# Patient Record
Sex: FEMALE | Race: WHITE | NOT HISPANIC OR LATINO | Employment: OTHER | URBAN - METROPOLITAN AREA
[De-identification: names, ages, dates, MRNs, and addresses within clinical notes are randomized per-mention and may not be internally consistent; named-entity substitution may affect disease eponyms.]

---

## 2017-05-02 DIAGNOSIS — E11.22 TYPE 2 DIABETES MELLITUS WITH DIABETIC CHRONIC KIDNEY DISEASE (H): Primary | ICD-10-CM

## 2017-05-02 NOTE — TELEPHONE ENCOUNTER
Test strips      Last Written Prescription Date: 5/24/13  Last Fill Quantity: 100,  # refills: 3   Last Office Visit with G, P or [facility] prescribing provider: 6/3/16

## 2017-05-08 ENCOUNTER — DOCUMENTATION ONLY (OUTPATIENT)
Dept: LAB | Facility: CLINIC | Age: 82
End: 2017-05-08

## 2017-05-08 DIAGNOSIS — E11.22 TYPE 2 DIABETES MELLITUS WITH STAGE 3 CHRONIC KIDNEY DISEASE (H): ICD-10-CM

## 2017-05-08 DIAGNOSIS — N18.30 CKD (CHRONIC KIDNEY DISEASE) STAGE 3, GFR 30-59 ML/MIN (H): ICD-10-CM

## 2017-05-08 DIAGNOSIS — N18.30 TYPE 2 DIABETES MELLITUS WITH STAGE 3 CHRONIC KIDNEY DISEASE, WITHOUT LONG-TERM CURRENT USE OF INSULIN (H): Primary | ICD-10-CM

## 2017-05-08 DIAGNOSIS — E78.5 HYPERLIPIDEMIA LDL GOAL <100: ICD-10-CM

## 2017-05-08 DIAGNOSIS — N18.30 TYPE 2 DIABETES MELLITUS WITH STAGE 3 CHRONIC KIDNEY DISEASE, WITHOUT LONG-TERM CURRENT USE OF INSULIN (H): ICD-10-CM

## 2017-05-08 DIAGNOSIS — E11.22 TYPE 2 DIABETES MELLITUS WITH STAGE 3 CHRONIC KIDNEY DISEASE, WITHOUT LONG-TERM CURRENT USE OF INSULIN (H): Primary | ICD-10-CM

## 2017-05-08 DIAGNOSIS — E11.22 TYPE 2 DIABETES MELLITUS WITH STAGE 3 CHRONIC KIDNEY DISEASE, WITHOUT LONG-TERM CURRENT USE OF INSULIN (H): ICD-10-CM

## 2017-05-08 DIAGNOSIS — N18.30 TYPE 2 DIABETES MELLITUS WITH STAGE 3 CHRONIC KIDNEY DISEASE (H): ICD-10-CM

## 2017-05-08 LAB
ALBUMIN SERPL-MCNC: 3.7 G/DL (ref 3.4–5)
ALP SERPL-CCNC: 77 U/L (ref 40–150)
ALT SERPL W P-5'-P-CCNC: 23 U/L (ref 0–50)
ANION GAP SERPL CALCULATED.3IONS-SCNC: 9 MMOL/L (ref 3–14)
AST SERPL W P-5'-P-CCNC: 20 U/L (ref 0–45)
BILIRUB SERPL-MCNC: 0.6 MG/DL (ref 0.2–1.3)
BUN SERPL-MCNC: 27 MG/DL (ref 7–30)
CALCIUM SERPL-MCNC: 9.2 MG/DL (ref 8.5–10.1)
CHLORIDE SERPL-SCNC: 107 MMOL/L (ref 94–109)
CHOLEST SERPL-MCNC: 185 MG/DL
CO2 SERPL-SCNC: 25 MMOL/L (ref 20–32)
CREAT SERPL-MCNC: 1.35 MG/DL (ref 0.52–1.04)
DEPRECATED CALCIDIOL+CALCIFEROL SERPL-MC: 24 UG/L (ref 20–75)
GFR SERPL CREATININE-BSD FRML MDRD: 37 ML/MIN/1.7M2
GLUCOSE SERPL-MCNC: 129 MG/DL (ref 70–99)
HBA1C MFR BLD: 6.2 % (ref 4.3–6)
HDLC SERPL-MCNC: 58 MG/DL
HGB BLD-MCNC: 12.4 G/DL (ref 11.7–15.7)
LDLC SERPL CALC-MCNC: 102 MG/DL
NONHDLC SERPL-MCNC: 127 MG/DL
POTASSIUM SERPL-SCNC: 4.3 MMOL/L (ref 3.4–5.3)
PROT SERPL-MCNC: 8 G/DL (ref 6.8–8.8)
SODIUM SERPL-SCNC: 141 MMOL/L (ref 133–144)
TRIGL SERPL-MCNC: 126 MG/DL

## 2017-05-08 PROCEDURE — 85018 HEMOGLOBIN: CPT | Performed by: INTERNAL MEDICINE

## 2017-05-08 PROCEDURE — 80053 COMPREHEN METABOLIC PANEL: CPT | Performed by: INTERNAL MEDICINE

## 2017-05-08 PROCEDURE — 83036 HEMOGLOBIN GLYCOSYLATED A1C: CPT | Performed by: INTERNAL MEDICINE

## 2017-05-08 PROCEDURE — 36415 COLL VENOUS BLD VENIPUNCTURE: CPT | Performed by: INTERNAL MEDICINE

## 2017-05-08 PROCEDURE — 80061 LIPID PANEL: CPT | Performed by: INTERNAL MEDICINE

## 2017-05-08 PROCEDURE — 82306 VITAMIN D 25 HYDROXY: CPT | Performed by: INTERNAL MEDICINE

## 2017-05-15 ENCOUNTER — OFFICE VISIT (OUTPATIENT)
Dept: INTERNAL MEDICINE | Facility: CLINIC | Age: 82
End: 2017-05-15
Payer: MEDICARE

## 2017-05-15 VITALS
DIASTOLIC BLOOD PRESSURE: 78 MMHG | TEMPERATURE: 99.4 F | HEIGHT: 67 IN | HEART RATE: 87 BPM | BODY MASS INDEX: 27.04 KG/M2 | SYSTOLIC BLOOD PRESSURE: 130 MMHG | WEIGHT: 172.3 LBS

## 2017-05-15 DIAGNOSIS — R53.83 OTHER FATIGUE: ICD-10-CM

## 2017-05-15 DIAGNOSIS — K21.9 GASTROESOPHAGEAL REFLUX DISEASE WITHOUT ESOPHAGITIS: ICD-10-CM

## 2017-05-15 DIAGNOSIS — N18.30 TYPE 2 DIABETES MELLITUS WITH STAGE 3 CHRONIC KIDNEY DISEASE, WITHOUT LONG-TERM CURRENT USE OF INSULIN (H): Primary | ICD-10-CM

## 2017-05-15 DIAGNOSIS — I10 ESSENTIAL HYPERTENSION, BENIGN: ICD-10-CM

## 2017-05-15 DIAGNOSIS — E11.22 TYPE 2 DIABETES MELLITUS WITH STAGE 3 CHRONIC KIDNEY DISEASE, WITHOUT LONG-TERM CURRENT USE OF INSULIN (H): Primary | ICD-10-CM

## 2017-05-15 DIAGNOSIS — N18.30 CKD (CHRONIC KIDNEY DISEASE) STAGE 3, GFR 30-59 ML/MIN (H): ICD-10-CM

## 2017-05-15 DIAGNOSIS — E78.5 HYPERLIPIDEMIA LDL GOAL <100: ICD-10-CM

## 2017-05-15 LAB
ANION GAP SERPL CALCULATED.3IONS-SCNC: 10 MMOL/L (ref 3–14)
BUN SERPL-MCNC: 24 MG/DL (ref 7–30)
CALCIUM SERPL-MCNC: 9.3 MG/DL (ref 8.5–10.1)
CHLORIDE SERPL-SCNC: 107 MMOL/L (ref 94–109)
CO2 SERPL-SCNC: 25 MMOL/L (ref 20–32)
CREAT SERPL-MCNC: 1.24 MG/DL (ref 0.52–1.04)
GFR SERPL CREATININE-BSD FRML MDRD: 41 ML/MIN/1.7M2
GLUCOSE SERPL-MCNC: 106 MG/DL (ref 70–99)
POTASSIUM SERPL-SCNC: 4 MMOL/L (ref 3.4–5.3)
SODIUM SERPL-SCNC: 142 MMOL/L (ref 133–144)
TSH SERPL DL<=0.005 MIU/L-ACNC: 1.77 MU/L (ref 0.4–4)

## 2017-05-15 PROCEDURE — 84443 ASSAY THYROID STIM HORMONE: CPT | Performed by: INTERNAL MEDICINE

## 2017-05-15 PROCEDURE — 82043 UR ALBUMIN QUANTITATIVE: CPT | Performed by: INTERNAL MEDICINE

## 2017-05-15 PROCEDURE — 36415 COLL VENOUS BLD VENIPUNCTURE: CPT | Performed by: INTERNAL MEDICINE

## 2017-05-15 PROCEDURE — 99207 C FOOT EXAM  NO CHARGE: CPT | Performed by: INTERNAL MEDICINE

## 2017-05-15 PROCEDURE — 80048 BASIC METABOLIC PNL TOTAL CA: CPT | Performed by: INTERNAL MEDICINE

## 2017-05-15 PROCEDURE — 99214 OFFICE O/P EST MOD 30 MIN: CPT | Performed by: INTERNAL MEDICINE

## 2017-05-15 NOTE — MR AVS SNAPSHOT
"              After Visit Summary   5/15/2017    Janet Cope    MRN: [id number]           Patient Information     Date Of Birth          8/7/1934        Visit Information        Provider Department      5/15/2017 3:30 PM Frank Kovacs MD [facility]        Today's Diagnoses     Type 2 diabetes mellitus with stage 3 chronic kidney disease, without long-term current use of insulin (H)    -  1    CKD (chronic kidney disease) stage 3, GFR 30-59 ml/min        Other fatigue          Care Instructions     Hold wine and Cetirizine/Zyrtec for 1 week to se if helps fatigue. If better, then add back one glass of wine per day or the the allergy medication to see if remains better still  Labs as ordered.  Will call you tomorrow with results and plan re: blood pressure medications  Repeat cholesterol bas in 3 months with same dose of Atorvastatin        Follow-ups after your visit        Who to contact     If you have questions or need follow up information about today's clinic visit or your schedule please contact [facility] directly at [phone].  Normal or non-critical lab and imaging results will be communicated to you by [email or web address], letter or phone within 4 business days after the clinic has received the results. If you do not hear from us within 7 days, please contact the clinic through [employer] or phone. If you have a critical or abnormal lab result, we will notify you by phone as soon as possible.  Submit refill requests through [employer] or call your pharmacy and they will forward the refill request to us. Please allow 3 business days for your refill to be completed.          Additional Information About Your Visit        [email or web address] Information     Verdiem lets you send messages to your doctor, view your test results, renew your prescriptions, schedule appointments and more. To sign up, go to [email or web address] . Click on \"Log in\" on the left side of the screen, " "which will take you to the Welcome page. Then click on \"Sign up Now\" on the right side of the page.     You will be asked to enter the access code listed below, as well as some personal information. Please follow the directions to create your username and password.     Your access code is: [email or web address]  Expires: 2017  4:14 PM     Your access code will  in 90 days. If you need help or a new code, please call your [facility] or [phone].        Care EveryWhere ID     This is your Care EveryWhere ID. This could be used by other organizations to access your [place] medical records  [id number]        Your Vitals Were     Pulse Temperature Height BMI (Body Mass Index)          87 99.4  F (37.4  C) (Oral) 5' 7\" (1.702 m) 26.99 kg/m2         Blood Pressure from Last 3 Encounters:   05/15/17 130/78   16 130/72   08/28/15 138/76    Weight from Last 3 Encounters:   05/15/17 172 lb 4.8 oz (78.2 kg)   16 178 lb 8 oz (81 kg)   08/28/15 174 lb (78.9 kg)              We Performed the Following     Albumin Random Urine Quantitative     Basic metabolic panel     EYE EXAM (SIMPLE-NONBILLABLE)     FOOT EXAM     TSH with free T4 reflex        Primary Care Provider Office Phone # Fax #    Frank Kovacs [phone]       [facility] [address] [facility] [address]        Thank you!     Thank you for choosing [facility]  for your care. Our goal is always to provide you with excellent care. Hearing back from our patients is one way we can continue to improve our services. Please take a few minutes to complete the written survey that you may receive in the mail after your visit with us. Thank you!             Your Updated Medication List - Protect others around you: Learn how to safely use, store and throw away your medicines at www.disposemymeds.org.          This list is accurate as of: 5/15/17  4:15 PM.  Always use your most recent med list.          "          Brand Name Dispense Instructions for use    amLODIPine 2.5 MG tablet    NORVASC    90 tablet    Take 1 tablet (2.5 mg) by mouth daily       ARTHRITIS PAIN RELIEVER 650 MG CR tablet   Generic drug:  acetaminophen      Take 1 tablet (650 mg) by mouth nightly as needed for mild pain or fever       aspirin 81 MG tablet          1 tab po QD (Once per day)       atorvastatin 20 MG tablet    LIPITOR    90 tablet    Take 1 tablet (20 mg) by mouth daily       CALCIUM-VITAMIN D 600 MG Caps          2 CAPSULE DAILY       fish oil-omega-3 fatty acids 1000 MG capsule      3 qd       losartan-hydrochlorothiazide 100-12.5 MG per tablet    HYZAAR    90 tablet    Take 1 tablet by mouth daily       NEW MED     1    One Touch Lancet device       omeprazole 20 MG tablet     180 tablet    Take 1 tablet (20 mg) by mouth 2 times daily Take 30-60 minutes before a meal.       ONE TOUCH ULTRA test strip   Generic drug:  blood glucose monitoring     50 strip    USE TO TEST ONCE DAILY       order for DME     50 strip    One Touch Ultra test strips. Checking sugars daily       potassium chloride 10 MEQ tablet    K-TAB,KLOR-CON    90 tablet    Take 1 tablet (10 mEq) by mouth daily       WAL-ZYR PO      Take 10 mg by mouth daily

## 2017-05-15 NOTE — PATIENT INSTRUCTIONS
Hold wine and Cetirizine/Zyrtec for 1 week to see if helps fatigue. If better, then add back one glass of wine per day or the the allergy medication to see if remains better still  Labs as ordered.  Continue current meds  Reduce saturated fats (red meats, fried and processed foods) in your diet, increase intake of foods rich in Omega-3 fatty acids (fish, nuts, seeds, etc) and increase the amount of color on your plate with fruits and vegetables.   Repeat cholesterol labs in 3 months with same dose of Atorvastatin

## 2017-05-15 NOTE — PROGRESS NOTES
SUBJECTIVE:                                                    Janet Cope is a 82 year old female who presents to clinic today for the following health issues:        Hyperlipidemia Follow-Up      Rate your low fat/cholesterol diet?: good    Taking statin?  Yes, no muscle aches from statin    Other lipid medications/supplements?:  none     Hypertension Follow-up      Outpatient blood pressures are not being checked.    Low Salt Diet: no added salt       Amount of exercise or physical activity: 4-5 days/week for an average of 30-45 minutes    Problems taking medications regularly: No    Medication side effects: none    Diet: low salt and low fat/cholesterol    Pt's past medical history, family history, habits, medications and allergies were reviewed with the patient today.  See snap shot for  HCM status. Most recent lab results reviewed with pt. Problem list and histories reviewed & adjusted, as indicated.  Additional history as below:    Component      Latest Ref Rng & Units 6/8/2016 5/8/2017 5/15/2017   Sodium      133 - 144 mmol/L 140 141 142   Potassium      3.4 - 5.3 mmol/L 4.2 4.3 4.0   Chloride      94 - 109 mmol/L 105 107 107   Carbon Dioxide      20 - 32 mmol/L 28 25 25   Anion Gap      3 - 14 mmol/L 7 9 10   Glucose      70 - 99 mg/dL 112 (H) 129 (H) 106 (H)   Urea Nitrogen      7 - 30 mg/dL 17 27 24   Creatinine      0.52 - 1.04 mg/dL 0.96 1.35 (H) 1.24 (H)   GFR Estimate      >60 mL/min/1.7m2 56 (L) 37 (L) 41 (L)   GFR Estimate If Black      >60 mL/min/1.7m2 68 45 (L) 50 (L)   Calcium      8.5 - 10.1 mg/dL 9.3 9.2 9.3   Bilirubin Total      0.2 - 1.3 mg/dL 0.6 0.6    Albumin      3.4 - 5.0 g/dL 3.6 3.7    Protein Total      6.8 - 8.8 g/dL 7.9 8.0    Alkaline Phosphatase      40 - 150 U/L 75 77    ALT      0 - 50 U/L 27 23    AST      0 - 45 U/L 21 20    Cholesterol      <200 mg/dL  185    Triglycerides      <150 mg/dL  126    HDL Cholesterol      >49 mg/dL  58    LDL Cholesterol Calculated       "<100 mg/dL  102 (H)    Non HDL Cholesterol      <130 mg/dL  127    Creatinine Urine      mg/dL   132   Albumin Urine mg/L      mg/L   16   Albumin Urine mg/g Cr      0 - 25 mg/g Cr   12.12   Hemoglobin A1C      4.3 - 6.0 %  6.2 (H)    TSH      0.40 - 4.00 mU/L   1.77     Denies CP, SOB, abdominal pain, polyuria, polydipsia, vision changes, extremity numbness/parasthesias or skin problems.   Not checking sugars at home. Mild fatigue. Sleeping OK.  Has mild fatigue. Patient has one glass of wine at night and also using Zyrtec occasionally for seasonal allergies    Additional ROS:   Constitutional, HEENT, Cardiovascular, Pulmonary, GI and , Neuro, MSK and Psych review of systems/symptoms are otherwise negative or unchanged from previous, except as noted above.      OBJECTIVE:  /78  Pulse 87  Temp 99.4  F (37.4  C) (Oral)  Ht 5' 7\" (1.702 m)  Wt 172 lb 4.8 oz (78.2 kg)  BMI 26.99 kg/m2   Estimated body mass index is 26.99 kg/(m^2) as calculated from the following:    Height as of this encounter: 5' 7\" (1.702 m).    Weight as of this encounter: 172 lb 4.8 oz (78.2 kg).  Eye: PERRL, EOMI  HENT: ear canals and TM's normal and nose and mouth without ulcers or lesions.  Nasal mucosa minimally edematous. No discharge  Neck: no adenopathy. Thyroid normal to palpation. No bruits  Pulm: Lungs clear to auscultation   CV: Regular rates and rhythm  GI: Soft, nontender, Normal active bowel sounds, No hepatosplenomegaly or masses palpable  Ext: Peripheral pulses intact. No edema.  Neuro: Normal strength and tone, sensory exam grossly normal    Assessment/Plan: (See plan discussion below for further details)  1. Type 2 diabetes mellitus with stage 3 chronic kidney disease, without long-term current use of insulin (H)  Controlled.   Labs today as ordered. Repeat A1C 6 mos  - FOOT EXAM  - EYE EXAM (SIMPLE-NONBILLABLE)  - Basic metabolic panel  - TSH with free T4 reflex  - Albumin Random Urine Quantitative  - Hemoglobin A1c; " Future  - Basic metabolic panel; Future    2. CKD (chronic kidney disease) stage 3, GFR 30-59 ml/min  Recently worsened. Repeat labs today  - Basic metabolic panel  - Albumin Random Urine Quantitative  - Basic metabolic panel; Future    3. Other fatigue  Lab as ordered. See below in addition  - TSH with free T4 reflex    4. BENIGN HYPERTENSION    Controlled. Continue current medications  - amLODIPine (NORVASC) 2.5 MG tablet; Take 1 tablet (2.5 mg) by mouth daily  Dispense: 90 tablet; Refill: 3  - losartan (COZAAR) 100 MG tablet; Take 1 tablet (100 mg) by mouth daily  Dispense: 90 tablet; Refill: 3  - Basic metabolic panel; Future    5. Hyperlipidemia LDL goal <100  LDL minimally above goal. Continue meds ands repeat lipids lab in 3 mos.  Improve diet  - atorvastatin (LIPITOR) 20 MG tablet; Take 1 tablet (20 mg) by mouth daily  Dispense: 90 tablet; Refill: 3  - Lipid panel reflex to direct LDL; Future    6. Gastroesophageal reflux disease without esophagitis  Controlled. Flares off of PPI. Continue current  meds  - omeprazole 20 MG tablet; Take 1 tablet (20 mg) by mouth 2 times daily Take 30-60 minutes before a meal.  Dispense: 180 tablet; Refill: 3      Plan discussion:   Hold wine and Cetirizine/Zyrtec for 1 week to see if helps fatigue. If better, then add back one glass of wine per day or the the allergy medication to see if remains better still  Labs as ordered.  Continue current meds  Reduce saturated fats (red meats, fried and processed foods) in your diet, increase intake of foods rich in Omega-3 fatty acids (fish, nuts, seeds, etc) and increase the amount of color on your plate with fruits and vegetables.   Repeat cholesterol labs in 3 months with same dose of Atorvastatin    Frank Kovacs MD  Internal Medicine Department  [facility]

## 2017-05-15 NOTE — NURSING NOTE
"Chief Complaint   Patient presents with     Hypertension     Lipids     Results       Initial /78  Pulse 87  Temp 99.4  F (37.4  C) (Oral)  Ht 5' 7\" (1.702 m)  Wt 172 lb 4.8 oz (78.2 kg)  BMI 26.99 kg/m2 Estimated body mass index is 26.99 kg/(m^2) as calculated from the following:    Height as of this encounter: 5' 7\" (1.702 m).    Weight as of this encounter: 172 lb 4.8 oz (78.2 kg).  Blood pressure completed using cuff size: lorenzo Clark LPN    "

## 2017-05-16 LAB
CREAT UR-MCNC: 132 MG/DL
CREAT UR-MCNC: NORMAL MG/DL
MICROALBUMIN UR-MCNC: 16 MG/L
MICROALBUMIN UR-MCNC: NORMAL MG/L
MICROALBUMIN/CREAT UR: 12.12 MG/G CR (ref 0–25)
MICROALBUMIN/CREAT UR: NORMAL MG/G CR (ref 0–25)

## 2017-05-17 ENCOUNTER — TELEPHONE (OUTPATIENT)
Dept: INTERNAL MEDICINE | Facility: CLINIC | Age: 82
End: 2017-05-17

## 2017-05-17 RX ORDER — LOSARTAN POTASSIUM 100 MG/1
100 TABLET ORAL DAILY
Qty: 90 TABLET | Refills: 3 | Status: SHIPPED | OUTPATIENT
Start: 2017-05-16 | End: 2018-04-24 | Stop reason: ALTCHOICE

## 2017-05-17 RX ORDER — NICOTINE POLACRILEX 4 MG/1
20 GUM, CHEWING ORAL 2 TIMES DAILY
Qty: 180 TABLET | Refills: 3 | Status: SHIPPED | OUTPATIENT
Start: 2017-05-16 | End: 2018-08-13

## 2017-05-17 RX ORDER — ATORVASTATIN CALCIUM 20 MG/1
20 TABLET, FILM COATED ORAL DAILY
Qty: 90 TABLET | Refills: 3 | Status: SHIPPED | OUTPATIENT
Start: 2017-05-16 | End: 2018-04-24

## 2017-05-17 RX ORDER — AMLODIPINE BESYLATE 2.5 MG/1
2.5 TABLET ORAL DAILY
Qty: 90 TABLET | Refills: 3 | Status: SHIPPED | OUTPATIENT
Start: 2017-05-16 | End: 2018-04-24 | Stop reason: DRUGHIGH

## 2017-05-17 NOTE — TELEPHONE ENCOUNTER
Call pt. Labs show kidney function a little better but still remains worse than 1 year ago. Stop  Losartan HCT. Start Losartan 100mg tab, 1 tab daily for blood pressure. Rx sent to pharmacy. Repeat nonfasting BMP lab in 2 weeks as ordered to reassess the kidney function off of the HCTZ. Since off of HCTZ, pt does not need to take potassium supplement so stop the potassium tab also

## 2017-05-30 DIAGNOSIS — N18.30 CKD (CHRONIC KIDNEY DISEASE) STAGE 3, GFR 30-59 ML/MIN (H): ICD-10-CM

## 2017-05-30 DIAGNOSIS — N18.30 TYPE 2 DIABETES MELLITUS WITH STAGE 3 CHRONIC KIDNEY DISEASE, WITHOUT LONG-TERM CURRENT USE OF INSULIN (H): ICD-10-CM

## 2017-05-30 DIAGNOSIS — E11.22 TYPE 2 DIABETES MELLITUS WITH STAGE 3 CHRONIC KIDNEY DISEASE, WITHOUT LONG-TERM CURRENT USE OF INSULIN (H): ICD-10-CM

## 2017-05-30 LAB
ANION GAP SERPL CALCULATED.3IONS-SCNC: 11 MMOL/L (ref 3–14)
BUN SERPL-MCNC: 24 MG/DL (ref 7–30)
CALCIUM SERPL-MCNC: 9.2 MG/DL (ref 8.5–10.1)
CHLORIDE SERPL-SCNC: 107 MMOL/L (ref 94–109)
CO2 SERPL-SCNC: 24 MMOL/L (ref 20–32)
CREAT SERPL-MCNC: 1.13 MG/DL (ref 0.52–1.04)
GFR SERPL CREATININE-BSD FRML MDRD: 46 ML/MIN/1.7M2
GLUCOSE SERPL-MCNC: 110 MG/DL (ref 70–99)
HBA1C MFR BLD: 5.9 % (ref 4.3–6)
POTASSIUM SERPL-SCNC: 4.2 MMOL/L (ref 3.4–5.3)
SODIUM SERPL-SCNC: 142 MMOL/L (ref 133–144)

## 2017-05-30 PROCEDURE — 36415 COLL VENOUS BLD VENIPUNCTURE: CPT | Performed by: INTERNAL MEDICINE

## 2017-05-30 PROCEDURE — 83036 HEMOGLOBIN GLYCOSYLATED A1C: CPT | Performed by: INTERNAL MEDICINE

## 2017-05-30 PROCEDURE — 80048 BASIC METABOLIC PNL TOTAL CA: CPT | Performed by: INTERNAL MEDICINE

## 2017-06-03 DIAGNOSIS — N18.30 CKD (CHRONIC KIDNEY DISEASE) STAGE 3, GFR 30-59 ML/MIN (H): ICD-10-CM

## 2017-06-03 DIAGNOSIS — E78.5 HYPERLIPIDEMIA LDL GOAL <100: Primary | ICD-10-CM

## 2017-06-06 ENCOUNTER — TELEPHONE (OUTPATIENT)
Dept: INTERNAL MEDICINE | Facility: CLINIC | Age: 82
End: 2017-06-06

## 2017-06-06 NOTE — TELEPHONE ENCOUNTER
Letter and copy of labs mailed to pt.Pt notified letter is being mailed today. Sudha Delacruz LPN

## 2017-06-06 NOTE — TELEPHONE ENCOUNTER
Reason for Call:  Request for results:    Name of test or procedure: lab    Date of test of procedure: 05/30/2017    Location of the test or procedure: [facility]    OK to leave the result message on voice mail or with a family member? YES    Phone number Patient can be reached at:  Home number on file [phone] (home)    Additional comments:     Call taken on 6/6/2017 at 10:10 AM by ANAHI CORONADO

## 2017-06-06 NOTE — TELEPHONE ENCOUNTER
Letter has already been done routed to my nurse. Per chart, has not been mailed. PLease print copy of letter and labs and mail to pt

## 2017-08-15 ENCOUNTER — MEDICAL CORRESPONDENCE (OUTPATIENT)
Dept: HEALTH INFORMATION MANAGEMENT | Facility: CLINIC | Age: 82
End: 2017-08-15

## 2017-09-07 DIAGNOSIS — N18.30 CKD (CHRONIC KIDNEY DISEASE) STAGE 3, GFR 30-59 ML/MIN (H): ICD-10-CM

## 2017-09-07 DIAGNOSIS — E78.5 HYPERLIPIDEMIA LDL GOAL <100: ICD-10-CM

## 2017-09-07 LAB
ANION GAP SERPL CALCULATED.3IONS-SCNC: 10 MMOL/L (ref 3–14)
BUN SERPL-MCNC: 23 MG/DL (ref 7–30)
CALCIUM SERPL-MCNC: 9.3 MG/DL (ref 8.5–10.1)
CHLORIDE SERPL-SCNC: 106 MMOL/L (ref 94–109)
CHOLEST SERPL-MCNC: 167 MG/DL
CO2 SERPL-SCNC: 24 MMOL/L (ref 20–32)
CREAT SERPL-MCNC: 1.12 MG/DL (ref 0.52–1.04)
GFR SERPL CREATININE-BSD FRML MDRD: 46 ML/MIN/1.7M2
GLUCOSE SERPL-MCNC: 106 MG/DL (ref 70–99)
HDLC SERPL-MCNC: 65 MG/DL
LDLC SERPL CALC-MCNC: 82 MG/DL
NONHDLC SERPL-MCNC: 102 MG/DL
POTASSIUM SERPL-SCNC: 4.1 MMOL/L (ref 3.4–5.3)
SODIUM SERPL-SCNC: 140 MMOL/L (ref 133–144)
TRIGL SERPL-MCNC: 101 MG/DL

## 2017-09-07 PROCEDURE — 36415 COLL VENOUS BLD VENIPUNCTURE: CPT | Performed by: INTERNAL MEDICINE

## 2017-09-07 PROCEDURE — 80048 BASIC METABOLIC PNL TOTAL CA: CPT | Performed by: INTERNAL MEDICINE

## 2017-09-07 PROCEDURE — 80061 LIPID PANEL: CPT | Performed by: INTERNAL MEDICINE

## 2017-09-08 ENCOUNTER — TELEPHONE (OUTPATIENT)
Dept: INTERNAL MEDICINE | Facility: CLINIC | Age: 82
End: 2017-09-08

## 2017-09-08 DIAGNOSIS — F41.8 SITUATIONAL ANXIETY: Primary | ICD-10-CM

## 2017-09-11 RX ORDER — LORAZEPAM 0.5 MG/1
0.5 TABLET ORAL EVERY 8 HOURS PRN
Qty: 10 TABLET | Refills: 0 | Status: SHIPPED | OUTPATIENT
Start: 2017-09-11 | End: 2018-05-22

## 2017-09-21 ENCOUNTER — TELEPHONE (OUTPATIENT)
Dept: INTERNAL MEDICINE | Facility: CLINIC | Age: 82
End: 2017-09-21

## 2017-09-21 DIAGNOSIS — N18.30 CKD (CHRONIC KIDNEY DISEASE) STAGE 3, GFR 30-59 ML/MIN (H): Primary | ICD-10-CM

## 2017-09-21 DIAGNOSIS — E11.22 TYPE 2 DIABETES MELLITUS WITH STAGE 3 CHRONIC KIDNEY DISEASE, WITHOUT LONG-TERM CURRENT USE OF INSULIN (H): ICD-10-CM

## 2017-09-21 DIAGNOSIS — N18.30 TYPE 2 DIABETES MELLITUS WITH STAGE 3 CHRONIC KIDNEY DISEASE, WITHOUT LONG-TERM CURRENT USE OF INSULIN (H): ICD-10-CM

## 2017-09-21 NOTE — TELEPHONE ENCOUNTER
Reason for Call:  Request for results:    Name of test or procedure: Lab Test(s)    Date of test of procedure: 09.07.17    Location of the test or procedure: [facility] to leave the result message on voice mail or with a family member? YES    Phone number Patient can be reached at:  Home number on file [phone] (home)    Additional comments: the patient would like to be called with her results regarding lab AND have a copy/report of the results mailed to her too    Call taken on 9/21/2017 at 8:42 AM by Liz Vargas

## 2017-09-25 ENCOUNTER — TRANSFERRED RECORDS (OUTPATIENT)
Dept: HEALTH INFORMATION MANAGEMENT | Facility: CLINIC | Age: 82
End: 2017-09-25

## 2017-11-23 DIAGNOSIS — E11.22 TYPE 2 DIABETES MELLITUS WITH DIABETIC CHRONIC KIDNEY DISEASE (H): ICD-10-CM

## 2017-11-27 DIAGNOSIS — E11.22 TYPE 2 DIABETES MELLITUS WITH DIABETIC CHRONIC KIDNEY DISEASE (H): ICD-10-CM

## 2018-01-31 NOTE — TELEPHONE ENCOUNTER
Not clear that lab results were sent to the patient.  Physician spoke with the patient today and reviewed normal lipids and improved kidney function.  Patient currently in [place] until late April or early May.  Patient will have a nonfasting blood and urine test done in early May when returns and will see me a few days later before running out of medications for refills.  Labs ordered

## 2018-04-17 DIAGNOSIS — E11.22 TYPE 2 DIABETES MELLITUS WITH STAGE 3 CHRONIC KIDNEY DISEASE, WITHOUT LONG-TERM CURRENT USE OF INSULIN (H): ICD-10-CM

## 2018-04-17 DIAGNOSIS — N18.30 CKD (CHRONIC KIDNEY DISEASE) STAGE 3, GFR 30-59 ML/MIN (H): ICD-10-CM

## 2018-04-17 DIAGNOSIS — N18.30 TYPE 2 DIABETES MELLITUS WITH STAGE 3 CHRONIC KIDNEY DISEASE, WITHOUT LONG-TERM CURRENT USE OF INSULIN (H): ICD-10-CM

## 2018-04-17 LAB
ALBUMIN SERPL-MCNC: 3.9 G/DL (ref 3.4–5)
ALP SERPL-CCNC: 64 U/L (ref 40–150)
ALT SERPL W P-5'-P-CCNC: 22 U/L (ref 0–50)
ANION GAP SERPL CALCULATED.3IONS-SCNC: 8 MMOL/L (ref 3–14)
AST SERPL W P-5'-P-CCNC: 20 U/L (ref 0–45)
BILIRUB SERPL-MCNC: 0.6 MG/DL (ref 0.2–1.3)
BUN SERPL-MCNC: 34 MG/DL (ref 7–30)
CALCIUM SERPL-MCNC: 9.5 MG/DL (ref 8.5–10.1)
CHLORIDE SERPL-SCNC: 105 MMOL/L (ref 94–109)
CO2 SERPL-SCNC: 26 MMOL/L (ref 20–32)
CREAT SERPL-MCNC: 1.25 MG/DL (ref 0.52–1.04)
GFR SERPL CREATININE-BSD FRML MDRD: 41 ML/MIN/1.7M2
GLUCOSE SERPL-MCNC: 93 MG/DL (ref 70–99)
HBA1C MFR BLD: 6 % (ref 0–5.6)
HGB BLD-MCNC: 12.2 G/DL (ref 11.7–15.7)
POTASSIUM SERPL-SCNC: 4.2 MMOL/L (ref 3.4–5.3)
PROT SERPL-MCNC: 7.8 G/DL (ref 6.8–8.8)
SODIUM SERPL-SCNC: 139 MMOL/L (ref 133–144)

## 2018-04-17 PROCEDURE — 36415 COLL VENOUS BLD VENIPUNCTURE: CPT | Performed by: INTERNAL MEDICINE

## 2018-04-17 PROCEDURE — 85018 HEMOGLOBIN: CPT | Performed by: INTERNAL MEDICINE

## 2018-04-17 PROCEDURE — 83036 HEMOGLOBIN GLYCOSYLATED A1C: CPT | Performed by: INTERNAL MEDICINE

## 2018-04-17 PROCEDURE — 80053 COMPREHEN METABOLIC PANEL: CPT | Performed by: INTERNAL MEDICINE

## 2018-04-24 ENCOUNTER — OFFICE VISIT (OUTPATIENT)
Dept: INTERNAL MEDICINE | Facility: CLINIC | Age: 83
End: 2018-04-24
Payer: MEDICARE

## 2018-04-24 VITALS
HEART RATE: 78 BPM | BODY MASS INDEX: 25.74 KG/M2 | HEIGHT: 67 IN | DIASTOLIC BLOOD PRESSURE: 76 MMHG | RESPIRATION RATE: 16 BRPM | TEMPERATURE: 98 F | SYSTOLIC BLOOD PRESSURE: 128 MMHG | WEIGHT: 164 LBS

## 2018-04-24 DIAGNOSIS — N18.30 CKD (CHRONIC KIDNEY DISEASE) STAGE 3, GFR 30-59 ML/MIN (H): ICD-10-CM

## 2018-04-24 DIAGNOSIS — K64.4 EXTERNAL HEMORRHOIDS: ICD-10-CM

## 2018-04-24 DIAGNOSIS — E11.22 TYPE 2 DIABETES MELLITUS WITH STAGE 3 CHRONIC KIDNEY DISEASE, WITHOUT LONG-TERM CURRENT USE OF INSULIN (H): ICD-10-CM

## 2018-04-24 DIAGNOSIS — M54.16 LUMBAR RADICULOPATHY: ICD-10-CM

## 2018-04-24 DIAGNOSIS — N18.30 TYPE 2 DIABETES MELLITUS WITH STAGE 3 CHRONIC KIDNEY DISEASE, WITHOUT LONG-TERM CURRENT USE OF INSULIN (H): ICD-10-CM

## 2018-04-24 DIAGNOSIS — E78.5 HYPERLIPIDEMIA LDL GOAL <100: ICD-10-CM

## 2018-04-24 DIAGNOSIS — F41.8 SITUATIONAL ANXIETY: ICD-10-CM

## 2018-04-24 DIAGNOSIS — I10 ESSENTIAL HYPERTENSION, BENIGN: ICD-10-CM

## 2018-04-24 PROCEDURE — 99214 OFFICE O/P EST MOD 30 MIN: CPT | Performed by: INTERNAL MEDICINE

## 2018-04-24 RX ORDER — IRBESARTAN AND HYDROCHLOROTHIAZIDE 300; 12.5 MG/1; MG/1
1 TABLET, FILM COATED ORAL DAILY
Qty: 90 TABLET | Refills: 3 | Status: SHIPPED | OUTPATIENT
Start: 2018-04-24 | End: 2018-05-24

## 2018-04-24 RX ORDER — HYDROCHLOROTHIAZIDE 12.5 MG/1
12.5 CAPSULE ORAL DAILY
COMMUNITY
End: 2018-04-24 | Stop reason: ALTCHOICE

## 2018-04-24 RX ORDER — BUSPIRONE HYDROCHLORIDE 15 MG/1
15 TABLET ORAL 3 TIMES DAILY
COMMUNITY
End: 2018-04-24

## 2018-04-24 RX ORDER — AMLODIPINE BESYLATE 2.5 MG/1
2.5 TABLET ORAL DAILY
Qty: 90 TABLET | Refills: 3 | Status: CANCELLED | OUTPATIENT
Start: 2018-04-24

## 2018-04-24 RX ORDER — AMLODIPINE BESYLATE 5 MG/1
5 TABLET ORAL DAILY
Qty: 90 TABLET | Refills: 3 | Status: SHIPPED | OUTPATIENT
Start: 2018-04-24 | End: 2018-05-24

## 2018-04-24 RX ORDER — BUSPIRONE HYDROCHLORIDE 15 MG/1
TABLET ORAL
COMMUNITY
Start: 2018-04-24 | End: 2021-01-04

## 2018-04-24 RX ORDER — ATORVASTATIN CALCIUM 20 MG/1
20 TABLET, FILM COATED ORAL DAILY
Qty: 90 TABLET | Refills: 3 | Status: SHIPPED | OUTPATIENT
Start: 2018-04-24 | End: 2018-10-17

## 2018-04-24 RX ORDER — LORAZEPAM 0.5 MG/1
0.5 TABLET ORAL EVERY 8 HOURS PRN
Qty: 10 TABLET | Refills: 0 | Status: CANCELLED | OUTPATIENT
Start: 2018-04-24

## 2018-04-24 RX ORDER — LOSARTAN POTASSIUM 100 MG/1
100 TABLET ORAL DAILY
Qty: 90 TABLET | Refills: 3 | Status: CANCELLED | OUTPATIENT
Start: 2018-04-24

## 2018-04-24 ASSESSMENT — PAIN SCALES - GENERAL: PAINLEVEL: MODERATE PAIN (5)

## 2018-04-24 NOTE — LETTER
My Depression Action Plan  Name: Janet Cope   Date of Birth 8/7/1934  Date: 4/24/2018    My doctor: Frank Kovacs   My clinic: [address] [address] [phone]          GREEN    ZONE   Good Control    What it looks like:     Things are going generally well. You have normal up s and down s. You may even feel depressed from time to time, but bad moods usually last less than a day.   What you need to do:  1. Continue to care for yourself (see self care plan)  2. Check your depression survival kit and update it as needed  3. Follow your physician s recommendations including any medication.  4. Do not stop taking medication unless you consult with your physician first.           YELLOW         ZONE Getting Worse    What it looks like:     Depression is starting to interfere with your life.     It may be hard to get out of bed; you may be starting to isolate yourself from others.    Symptoms of depression are starting to last most all day and this has happened for several days.     You may have suicidal thoughts but they are not constant.   What you need to do:     1. Call your care team, your response to treatment will improve if you keep your care team informed of your progress. Yellow periods are signs an adjustment may need to be made.     2. Continue your self-care, even if you have to fake it!    3. Talk to someone in your support network    4. Open up your depression survival kit           RED    ZONE Medical Alert - Get Help    What it looks like:     Depression is seriously interfering with your life.     You may experience these or other symptoms: You can t get out of bed most days, can t work or engage in other necessary activities, you have trouble taking care of basic hygiene, or basic responsibilities, thoughts of suicide or death that will not go away, self-injurious behavior.     What you need to do:  1. Call your care  team and request a same-day appointment. If they are not available (weekends or after hours) call your local crisis line, emergency room or 911.            Depression Self Care Plan / Survival Kit    Self-Care for Depression  Here s the deal. Your body and mind are really not as separate as most people think.  What you do and think affects how you feel and how you feel influences what you do and think. This means if you do things that people who feel good do, it will help you feel better.  Sometimes this is all it takes.  There is also a place for medication and therapy depending on how severe your depression is, so be sure to consult with your medical provider and/ or Behavioral Health Consultant if your symptoms are worsening or not improving.     In order to better manage my stress, I will:    Exercise  Get some form of exercise, every day. This will help reduce pain and release endorphins, the  feel good  chemicals in your brain. This is almost as good as taking antidepressants!  This is not the same as joining a gym and then never going! (they count on that by the way ) It can be as simple as just going for a walk or doing some gardening, anything that will get you moving.      Hygiene   Maintain good hygiene (Get out of bed in the morning, Make your bed, Brush your teeth, Take a shower, and Get dressed like you were going to work, even if you are unemployed).  If your clothes don't fit try to get ones that do.    Diet  I will strive to eat foods that are good for me, drink plenty of water, and avoid excessive sugar, caffeine, alcohol, and other mood-altering substances.  Some foods that are helpful in depression are: complex carbohydrates, B vitamins, flaxseed, fish or fish oil, fresh fruits and vegetables.    Psychotherapy  I agree to participate in Individual Therapy (if recommended).    Medication  If prescribed medications, I agree to take them.  Missing doses can result in serious side effects.  I  understand that drinking alcohol, or other illicit drug use, may cause potential side effects.  I will not stop my medication abruptly without first discussing it with my provider.    Staying Connected With Others  I will stay in touch with my friends, family members, and my primary care provider/team.    Use your imagination  Be creative.  We all have a creative side; it doesn t matter if it s oil painting, sand castles, or mud pies! This will also kick up the endorphins.    Witness Beauty  (AKA stop and smell the roses) Take a look outside, even in mid-winter. Notice colors, textures. Watch the squirrels and birds.     Service to others  Be of service to others.  There is always someone else in need.  By helping others we can  get out of ourselves  and remember the really important things.  This also provides opportunities for practicing all the other parts of the program.    Humor  Laugh and be silly!  Adjust your TV habits for less news and crime-drama and more comedy.    Control your stress  Try breathing deep, massage therapy, biofeedback, and meditation. Find time to relax each day.     My support system    Clinic Contact:  Phone number:    Contact 1:  Phone number:    Contact 2:  Phone number:    Confucianism/:  Phone number:    Therapist:  Phone number:    Local crisis center:    Phone number:    Other community support:  Phone number:

## 2018-04-24 NOTE — PATIENT INSTRUCTIONS
Stop Irbesartan and hydrochlorothiazide separate tablets  Start Irbesartan /12.5mg, 1 tab daily so one less pill to take for blood pressure   Continue other medications. Refill done  If urine frequency at night bothersome, try to stop wine intake  for 1 week to see if makes a difference  Low salt intake   Fasting labs in late October. For fasting labs, please refrain from eating for 8 hours or more.  Be sure to  drink water and take your  medications the day of the test.  If back/leg issues worsen, then  Let me know and  will get MRI of the low back for probable pinched nerve  Appt with [facility] in [facility]. [phone].

## 2018-04-24 NOTE — MR AVS SNAPSHOT
After Visit Summary   4/24/2018    Janet Cope    MRN: [id number]           Patient Information     Date Of Birth          8/7/1934        Visit Information        Provider Department      4/24/2018 9:30 AM Frank Kovacs MD [facility]        Today's Diagnoses     Hyperlipidemia LDL goal <100        BENIGN HYPERTENSION        Situational anxiety          Care Instructions     Stop Irbesartan and hydrochlorothiazide separate tablets  Start Irbesartan /12.5mg, 1 tab daily so one less pill to take for blood pressure   Continue other medications. Refill done  If urine frequency at night bothersome, try to stop wine intake  for 1 week to see if makes a difference  Low salt intake   Fasting labs in late October. For fasting labs, please refrain from eating for 8 hours or more.  Be sure to  drink water and take your  medications the day of the test.  If back/leg issues worsen, then  Let me know and  will get MRI of the low back for probable pinched nerve  Appt with [facility] in [facility]. [phone].              Follow-ups after your visit        Who to contact     If you have questions or need follow up information about today's clinic visit or your schedule please contact [facility] directly at [phone].  Normal or non-critical lab and imaging results will be communicated to you by MyChart, letter or phone within 4 business days after the clinic has received the results. If you do not hear from us within 7 days, please contact the clinic through MyChart or phone. If you have a critical or abnormal lab result, we will notify you by phone as soon as possible.  Submit refill requests through [employer] or call your pharmacy and they will forward the refill request to us. Please allow 3 business days for your refill to be completed.          Additional Information About Your Visit        MyChart Information      "[employer] lets you send messages to your doctor, view your test results, renew your prescriptions, schedule appointments and more. To sign up, go to [email or web address] . Click on \"Log in\" on the left side of the screen, which will take you to the Welcome page. Then click on \"Sign up Now\" on the right side of the page.     You will be asked to enter the access code listed below, as well as some personal information. Please follow the directions to create your username and password.     Your access code is: [email or web address]  Expires: 2018 10:14 AM     Your access code will  in 90 days. If you need help or a new code, please call your [place] clinic or [phone].        Care EveryWhere ID     This is your Care EveryWhere ID. This could be used by other organizations to access your [place] medical records  [id number]        Your Vitals Were     Pulse Temperature Respirations Height BMI (Body Mass Index)       78 98  F (36.7  C) (Oral) 16 5' 7\" (1.702 m) 25.69 kg/m2        Blood Pressure from Last 3 Encounters:   18 128/76   05/15/17 130/78   16 130/72    Weight from Last 3 Encounters:   18 164 lb (74.4 kg)   05/15/17 172 lb 4.8 oz (78.2 kg)   16 178 lb 8 oz (81 kg)              We Performed the Following     DEPRESSION ACTION PLAN (DAP)          Today's Medication Changes          These changes are accurate as of 18 10:15 AM.  If you have any questions, ask your nurse or doctor.               Start taking these medicines.        Dose/Directions    irbesartan-hydrochlorothiazide 300-12.5 MG per tablet   Commonly known as:  AVALIDE   Used for:  Essential hypertension, benign   Started by:  Frank Kovacs MD        Dose:  1 tablet   Take 1 tablet by mouth daily   Quantity:  90 tablet   Refills:  3         These medicines have changed or have updated prescriptions.        Dose/Directions    amLODIPine 5 MG tablet   Commonly known as:  NORVASC   This may have changed:    - " medication strength  - how much to take   Used for:  Essential hypertension, benign   Changed by:  Frank Kovacs MD        Dose:  5 mg   Take 1 tablet (5 mg) by mouth daily   Quantity:  90 tablet   Refills:  3       busPIRone 15 MG tablet   Commonly known as:  BUSPAR   This may have changed:    - how much to take  - how to take this  - when to take this  - additional instructions   Used for:  Situational anxiety   Changed by:  Frank Kovacs MD        1/2 tab daily as needed for anxiety (rare use)   Refills:  0         Stop taking these medicines if you haven't already. Please contact your care team if you have questions.     hydrochlorothiazide 12.5 MG capsule   Commonly known as:  MICROZIDE   Stopped by:  Frank Kovacs MD           losartan 100 MG tablet   Commonly known as:  COZAAR   Stopped by:  Frank Kovacs MD                Where to get your medicines      These medications were sent to [facility] [address] [place] [address] [address] AT [facility] [address]  [address], [facility] [address]     Phone:  [phone]     amLODIPine 5 MG tablet    atorvastatin 20 MG tablet    irbesartan-hydrochlorothiazide 300-12.5 MG per tablet                Primary Care Provider Office Phone # Fax #    Frank Kovacs [phone]       [address] [facility][1] [address]        Equal Access to Services     [facility] Hospital[facility] AH: Hadii aad ku hadasho Soomaali, waaxda luqadaha, qaybta kaalmada adeegyada, gregorio etiennein hayjavyn palma fuentes . So [facility] [phone].    ATENCIÓN: Si habla español, tiene a sargent disposición servicios gratuitos de asistencia lingüística. Llame al [phone].    We comply with applicable federal civil rights laws and [place] laws. We do not discriminate on the basis of race, color, national origin, age, disability, sex, sexual orientation, or gender identity.            Thank you!     Thank you for choosing [facility]   for your care. Our goal is always to provide you with excellent care. Hearing back from our patients is one way we can continue to improve our services. Please take a few minutes to complete the written survey that you may receive in the mail after your visit with us. Thank you!             Your Updated Medication List - Protect others around you: Learn how to safely use, store and throw away your medicines at www.disposemymeds.org.          This list is accurate as of 4/24/18 10:15 AM.  Always use your most recent med list.                   Brand Name Dispense Instructions for use Diagnosis    amLODIPine 5 MG tablet    NORVASC    90 tablet    Take 1 tablet (5 mg) by mouth daily    Essential hypertension, benign       ARTHRITIS PAIN RELIEVER 650 MG CR tablet   Generic drug:  acetaminophen      Take 1 tablet (650 mg) by mouth nightly as needed for mild pain or fever    Cramp of limb       aspirin 81 MG tablet          1 tab po QD (Once per day)    Type II or unspecified type diabetes mellitus without mention of complication, not stated as uncontrolled       atorvastatin 20 MG tablet    LIPITOR    90 tablet    Take 1 tablet (20 mg) by mouth daily    Hyperlipidemia LDL goal <100       busPIRone 15 MG tablet    BUSPAR     1/2 tab daily as needed for anxiety (rare use)    Situational anxiety       CALCIUM-VITAMIN D 600 MG Caps          2 CAPSULE DAILY        fish oil-omega-3 fatty acids 1000 MG capsule      3 qd        irbesartan-hydrochlorothiazide 300-12.5 MG per tablet    AVALIDE    90 tablet    Take 1 tablet by mouth daily    Essential hypertension, benign       LORazepam 0.5 MG tablet    ATIVAN    10 tablet    Take 1 tablet (0.5 mg) by mouth every 8 hours as needed for anxiety    Situational anxiety       NEW MED     1    One Touch Lancet device    Type II or unspecified type diabetes mellitus without mention of complication, not stated as uncontrolled       omeprazole 20 MG tablet     180 tablet    Take 1 tablet  (20 mg) by mouth 2 times daily Take 30-60 minutes before a meal.    Gastroesophageal reflux disease without esophagitis       * ONETOUCH ULTRA test strip   Generic drug:  blood glucose monitoring     50 strip    USE TO TEST ONCE DAILY    Type 2 diabetes mellitus with diabetic chronic kidney disease (H)       * ONETOUCH ULTRA test strip   Generic drug:  blood glucose monitoring     50 strip    USE TO TEST ONCE DAILY    Type 2 diabetes mellitus with diabetic chronic kidney disease (H)       order for DME     50 strip    One Touch Ultra test strips. Checking sugars daily    Type 2 diabetes, HbA1c goal < 7% (H)       WAL-ZYR PO      Take 10 mg by mouth daily        * Notice:  This list has 2 medication(s) that are the same as other medications prescribed for you. Read the directions carefully, and ask your doctor or other care provider to review them with you.

## 2018-04-24 NOTE — PROGRESS NOTES
SUBJECTIVE:   Janet Cope is a 83 year old female who presents to clinic today for the following health issues:      Diabetes Follow-up    Patient is checking blood sugars: once daily.  Results are as follows:         am - 130's to 150's    Diabetic concerns: None     Symptoms of hypoglycemia (low blood sugar): none     Paresthesias (numbness or burning in feet) or sores: No     Date of last diabetic eye exam: 05/15/2017    Hyperlipidemia Follow-Up      Rate your low fat/cholesterol diet?: fair    Taking statin?  Yes, muscle aches, possibly from other cause    Other lipid medications/supplements?:  none    Hypertension Follow-up      Outpatient blood pressures are not being checked.    Low Salt Diet: low salt    BP Readings from Last 2 Encounters:   05/15/17 130/78   06/03/16 130/72     Hemoglobin A1C (%)   Date Value   04/17/2018 6.0 (H)   05/30/2017 5.9     LDL Cholesterol Calculated (mg/dL)   Date Value   09/07/2017 82   05/08/2017 102 (H)       Amount of exercise or physical activity: Minimal; due to leg pain    Problems taking medications regularly: No    Medication side effects: none    Diet: regular (no restrictions), low salt       Component      Latest Ref Rng & Units 5/8/2017 5/15/2017 5/30/2017 9/7/2017   Sodium      133 - 144 mmol/L 141  142 140   Potassium      3.4 - 5.3 mmol/L 4.3  4.2 4.1   Chloride      94 - 109 mmol/L 107  107 106   Carbon Dioxide      20 - 32 mmol/L 25  24 24   Anion Gap      3 - 14 mmol/L 9  11 10   Glucose      70 - 99 mg/dL 129 (H)  110 (H) 106 (H)   Urea Nitrogen      7 - 30 mg/dL 27  24 23   Creatinine      0.52 - 1.04 mg/dL 1.35 (H)  1.13 (H) 1.12 (H)   GFR Estimate      >60 mL/min/1.7m2 37 (L)  46 (L) 46 (L)   GFR Estimate If Black      >60 mL/min/1.7m2 45 (L)  56 (L) 56 (L)   Calcium      8.5 - 10.1 mg/dL 9.2  9.2 9.3   Bilirubin Total      0.2 - 1.3 mg/dL 0.6      Albumin      3.4 - 5.0 g/dL 3.7      Protein Total      6.8 - 8.8 g/dL 8.0      Alkaline  Phosphatase      40 - 150 U/L 77      ALT      0 - 50 U/L 23      AST      0 - 45 U/L 20      Cholesterol      <200 mg/dL    167   Triglycerides      <150 mg/dL    101   HDL Cholesterol      >49 mg/dL    65   LDL Cholesterol Calculated      <100 mg/dL    82   Non HDL Cholesterol      <130 mg/dL    102   Creatinine Urine      mg/dL  132     Albumin Urine mg/L      mg/L  16     Albumin Urine mg/g Cr      0 - 25 mg/g Cr  12.12     Vitamin D Deficiency screening      20 - 75 ug/L 24      TSH      0.40 - 4.00 mU/L  1.77     Hemoglobin A1C      0 - 5.6 %   5.9    Hemoglobin      11.7 - 15.7 g/dL         Component      Latest Ref Rng & Units 4/17/2018   Sodium      133 - 144 mmol/L 139   Potassium      3.4 - 5.3 mmol/L 4.2   Chloride      94 - 109 mmol/L 105   Carbon Dioxide      20 - 32 mmol/L 26   Anion Gap      3 - 14 mmol/L 8   Glucose      70 - 99 mg/dL 93   Urea Nitrogen      7 - 30 mg/dL 34 (H)   Creatinine      0.52 - 1.04 mg/dL 1.25 (H)   GFR Estimate      >60 mL/min/1.7m2 41 (L)   GFR Estimate If Black      >60 mL/min/1.7m2 49 (L)   Calcium      8.5 - 10.1 mg/dL 9.5   Bilirubin Total      0.2 - 1.3 mg/dL 0.6   Albumin      3.4 - 5.0 g/dL 3.9   Protein Total      6.8 - 8.8 g/dL 7.8   Alkaline Phosphatase      40 - 150 U/L 64   ALT      0 - 50 U/L 22   AST      0 - 45 U/L 20   Cholesterol      <200 mg/dL    Triglycerides      <150 mg/dL    HDL Cholesterol      >49 mg/dL    LDL Cholesterol Calculated      <100 mg/dL    Non HDL Cholesterol      <130 mg/dL    Creatinine Urine      mg/dL    Albumin Urine mg/L      mg/L    Albumin Urine mg/g Cr      0 - 25 mg/g Cr    Vitamin D Deficiency screening      20 - 75 ug/L    TSH      0.40 - 4.00 mU/L    Hemoglobin A1C      0 - 5.6 % 6.0 (H)   Hemoglobin      11.7 - 15.7 g/dL 12.2       Pt's past medical history, family history, habits, medications and allergies were reviewed with the patient today.  See snap shot for  HCM status. Most recent lab results reviewed with pt.  "Problem list and histories reviewed & adjusted, as indicated.  Additional history as below:    Denies CP, SOB, abdominal pain, polyuria, polydipsia, vision changes, extremity numbness/parasthesias or skin problems.  Had BP meds changed in [place] with Irbesartan rather than Losartan and Amlodipine dose increased  Weight down 8 pounds  Pain RLE and back with prolonged standing and walking 1 block. No calf cramps. Declines treatment/further eval. Pt states sx stable  Rare use of Buspar for anxiety. Denies anxiety sx today. Got Rx for MD in [place]. Last use weeks ago  HAs external hemorrhoid that is bothersome and occ pain/itching. Has tried Sitz bathes and prep H. NO recent bleeding. Wants to have it banded and requests referral     Additional ROS:   Constitutional, HEENT, Cardiovascular, Pulmonary, GI and , Neuro, MSK and Psych review of systems/symptoms are otherwise negative or unchanged from previous, except as noted above.      OBJECTIVE:  /76  Pulse 78  Temp 98  F (36.7  C) (Oral)  Resp 16  Ht 5' 7\" (1.702 m)  Wt 164 lb (74.4 kg)  BMI 25.69 kg/m2   Estimated body mass index is 25.69 kg/(m^2) as calculated from the following:    Height as of this encounter: 5' 7\" (1.702 m).    Weight as of this encounter: 164 lb (74.4 kg).  Eye: PERRL, EOMI  HENT: ear canals and TM's normal and nose and mouth without ulcers or lesions   Neck: no adenopathy. Thyroid normal to palpation. No bruits  Pulm: Lungs clear to auscultation   CV: Regular rates and rhythm  GI: Soft, nontender, Normal active bowel sounds, No hepatosplenomegaly or masses palpable  Ext: Peripheral pulses intact. Minimal BLE edema.  Neuro: Normal strength and tone, sensory exam grossly normal  Back: Tenderness to palpation right paralumbar area. Neg SLRT bilaterally.    Rectal: External hemorrhoid. Not thrombosed and not bleeding    Assessment/Plan: (See plan discussion below for further details)  1. BENIGN HYPERTENSION  Controlled. Will combine " Irbesartan and HCT  - amLODIPine (NORVASC) 5 MG tablet; Take 1 tablet (5 mg) by mouth daily  Dispense: 90 tablet; Refill: 3  - irbesartan-hydrochlorothiazide (AVALIDE) 300-12.5 MG per tablet; Take 1 tablet by mouth daily  Dispense: 90 tablet; Refill: 3    2. Type 2 diabetes mellitus with stage 3 chronic kidney disease, without long-term current use of insulin (H)  Controlled. Repeat labs 6 mos  - Hemoglobin A1c; Future  - Comprehensive metabolic panel; Future  - Lipid panel reflex to direct LDL Fasting; Future  - TSH with free T4 reflex; Future  - Albumin Random Urine Quantitative with Creat Ratio; Future    3. Hyperlipidemia LDL goal <100  Continue Lipitor. Repeat labs  6 mos  - Comprehensive metabolic panel; Future  - Lipid panel reflex to direct LDL Fasting; Future  - atorvastatin (LIPITOR) 20 MG tablet; Take 1 tablet (20 mg) by mouth daily  Dispense: 90 tablet; Refill: 3    4. Situational anxiety  Controlled. Rare use Buspar. Deneis anxiety today  - busPIRone (BUSPAR) 15 MG tablet; 1/2 tab daily as needed for anxiety (rare use)    5. Lumbar radiculopathy  Pt declines eval/treat. If worsens, pt will inform MD. Not limiting ADLs. No B/B incontinence. See plan discussion below.     6. CKD (chronic kidney disease) stage 3, GFR 30-59 ml/min   stable. Repeat labs 6 mos  - Comprehensive metabolic panel; Future  - CBC with platelets; Future    7. External hemorrhoids  Referal to [facility]  - GASTROENTEROLOGY ADULT REF PROCEDURE ONLY Other; [facility] [phone]      Plan discussion:   Stop Irbesartan and hydrochlorothiazide separate tablets  Start Irbesartan /12.5mg, 1 tab daily so one less pill to take for blood pressure   Continue other medications. Refill done  If urine frequency at night bothersome, try to stop wine intake  for 1 week to see if makes a difference  Low salt intake   Fasting labs in late October. For fasting labs, please refrain from eating for 8 hours or more.  Be sure to  drink water and take your   medications the day of the test.  If back/leg issues worsen, then  Let me know and  will get MRI of the low back for probable pinched nerve          Frank Kovacs MD  Internal Medicine Department  [facility]

## 2018-04-25 PROBLEM — F32.0 MILD MAJOR DEPRESSION (H): Status: ACTIVE | Noted: 2018-04-25

## 2018-04-25 ASSESSMENT — PATIENT HEALTH QUESTIONNAIRE - PHQ9: SUM OF ALL RESPONSES TO PHQ QUESTIONS 1-9: 8

## 2018-05-22 ENCOUNTER — OFFICE VISIT (OUTPATIENT)
Dept: INTERNAL MEDICINE | Facility: CLINIC | Age: 83
End: 2018-05-22
Payer: MEDICARE

## 2018-05-22 VITALS
OXYGEN SATURATION: 98 % | RESPIRATION RATE: 20 BRPM | HEART RATE: 84 BPM | TEMPERATURE: 98.4 F | SYSTOLIC BLOOD PRESSURE: 140 MMHG | BODY MASS INDEX: 26.07 KG/M2 | DIASTOLIC BLOOD PRESSURE: 66 MMHG | HEIGHT: 67 IN | WEIGHT: 166.1 LBS

## 2018-05-22 DIAGNOSIS — E55.9 VITAMIN D DEFICIENCY: ICD-10-CM

## 2018-05-22 DIAGNOSIS — R42 DIZZINESS: Primary | ICD-10-CM

## 2018-05-22 DIAGNOSIS — Z13.29 SCREENING FOR THYROID DISORDER: ICD-10-CM

## 2018-05-22 DIAGNOSIS — Z13.21 ENCOUNTER FOR VITAMIN DEFICIENCY SCREENING: ICD-10-CM

## 2018-05-22 DIAGNOSIS — R26.81 GAIT INSTABILITY: ICD-10-CM

## 2018-05-22 DIAGNOSIS — R42 LIGHTHEADEDNESS: ICD-10-CM

## 2018-05-22 LAB
ALBUMIN SERPL-MCNC: 3.7 G/DL (ref 3.4–5)
ALP SERPL-CCNC: 69 U/L (ref 40–150)
ALT SERPL W P-5'-P-CCNC: 23 U/L (ref 0–50)
ANION GAP SERPL CALCULATED.3IONS-SCNC: 9 MMOL/L (ref 3–14)
AST SERPL W P-5'-P-CCNC: 20 U/L (ref 0–45)
BILIRUB SERPL-MCNC: 0.4 MG/DL (ref 0.2–1.3)
BUN SERPL-MCNC: 35 MG/DL (ref 7–30)
CALCIUM SERPL-MCNC: 9.4 MG/DL (ref 8.5–10.1)
CHLORIDE SERPL-SCNC: 103 MMOL/L (ref 94–109)
CO2 SERPL-SCNC: 25 MMOL/L (ref 20–32)
CREAT SERPL-MCNC: 1.35 MG/DL (ref 0.52–1.04)
ERYTHROCYTE [DISTWIDTH] IN BLOOD BY AUTOMATED COUNT: 13.3 % (ref 10–15)
GFR SERPL CREATININE-BSD FRML MDRD: 37 ML/MIN/1.7M2
GLUCOSE SERPL-MCNC: 96 MG/DL (ref 70–99)
HCT VFR BLD AUTO: 37.2 % (ref 35–47)
HGB BLD-MCNC: 12.1 G/DL (ref 11.7–15.7)
MCH RBC QN AUTO: 29.4 PG (ref 26.5–33)
MCHC RBC AUTO-ENTMCNC: 32.5 G/DL (ref 31.5–36.5)
MCV RBC AUTO: 90 FL (ref 78–100)
PLATELET # BLD AUTO: 227 10E9/L (ref 150–450)
POTASSIUM SERPL-SCNC: 3.9 MMOL/L (ref 3.4–5.3)
PROT SERPL-MCNC: 7.8 G/DL (ref 6.8–8.8)
RBC # BLD AUTO: 4.12 10E12/L (ref 3.8–5.2)
SODIUM SERPL-SCNC: 137 MMOL/L (ref 133–144)
TSH SERPL DL<=0.005 MIU/L-ACNC: 1.67 MU/L (ref 0.4–4)
VIT B12 SERPL-MCNC: 233 PG/ML (ref 193–986)
WBC # BLD AUTO: 6.9 10E9/L (ref 4–11)

## 2018-05-22 PROCEDURE — 36415 COLL VENOUS BLD VENIPUNCTURE: CPT | Performed by: INTERNAL MEDICINE

## 2018-05-22 PROCEDURE — 85027 COMPLETE CBC AUTOMATED: CPT | Performed by: INTERNAL MEDICINE

## 2018-05-22 PROCEDURE — 82306 VITAMIN D 25 HYDROXY: CPT | Performed by: INTERNAL MEDICINE

## 2018-05-22 PROCEDURE — 99214 OFFICE O/P EST MOD 30 MIN: CPT | Performed by: INTERNAL MEDICINE

## 2018-05-22 PROCEDURE — 80053 COMPREHEN METABOLIC PANEL: CPT | Performed by: INTERNAL MEDICINE

## 2018-05-22 PROCEDURE — 84443 ASSAY THYROID STIM HORMONE: CPT | Performed by: INTERNAL MEDICINE

## 2018-05-22 PROCEDURE — 82607 VITAMIN B-12: CPT | Performed by: INTERNAL MEDICINE

## 2018-05-22 NOTE — MR AVS SNAPSHOT
"              After Visit Summary   5/22/2018    Janet Cope    MRN: [id number]           Patient Information     Date Of Birth          8/7/1934        Visit Information        Provider Department      5/22/2018 2:30 PM Jaja Li MD [facility]        Today's Diagnoses     Gait instability    -  1    Lightheadedness        Encounter for vitamin deficiency screening        Screening for thyroid disorder        Vitamin D deficiency          Care Instructions    We will check orthostatics today.    ----    Labs to follow.    ---    If all are normal, recommend physical therapy for balance and stability.           Follow-ups after your visit        Who to contact     If you have questions or need follow up information about today's clinic visit or your schedule please contact [facility] directly at [phone].  Normal or non-critical lab and imaging results will be communicated to you by MyChart, letter or phone within 4 business days after the clinic has received the results. If you do not hear from us within 7 days, please contact the clinic through MyChart or phone. If you have a critical or abnormal lab result, we will notify you by phone as soon as possible.  Submit refill requests through [employer] or call your pharmacy and they will forward the refill request to us. Please allow 3 business days for your refill to be completed.          Additional Information About Your Visit        MyChart Information     [employer] lets you send messages to your doctor, view your test results, renew your prescriptions, schedule appointments and more. To sign up, go to [email or web address] . Click on \"Log in\" on the left side of the screen, which will take you to the Welcome page. Then click on \"Sign up Now\" on the right side of the page.     You will be asked to enter the access code listed below, as well as some personal information. Please follow the directions " "to create your username and password.     Your access code is: [email or web address]  Expires: 2018 10:14 AM     Your access code will  in 90 days. If you need help or a new code, please call your [place] clinic or [phone].        Care EveryWhere ID     This is your Care EveryWhere ID. This could be used by other organizations to access your [place] medical records  [id number]        Your Vitals Were     Pulse Temperature Respirations Height Pulse Oximetry BMI (Body Mass Index)    84 98.4  F (36.9  C) (Oral) 20 5' 7\" (1.702 m) 98% 26.01 kg/m2       Blood Pressure from Last 3 Encounters:   18 140/66   18 128/76   05/15/17 130/78    Weight from Last 3 Encounters:   18 166 lb 1.6 oz (75.3 kg)   18 164 lb (74.4 kg)   05/15/17 172 lb 4.8 oz (78.2 kg)              We Performed the Following     CBC with platelets     Comprehensive metabolic panel     TSH with free T4 reflex     Vitamin B12     Vitamin D Deficiency        Primary Care Provider Office Phone # Fax #    Frank Kovacs [phone]       [address] [place] [address]        Equal Access to Services     MIREYA SILVEIRA : Hadii aad ku hadasho Soomaali, waaxda luqadaha, qaybta kaalmada adeegyada, waxay idiin hayjuliette fuentes . So [facility] [phone].    ATENCIÓN: Si habla español, tiene a sargent disposición servicios gratuitos de asistencia lingüística. Llame al [phone].    We comply with applicable federal civil rights laws and [place] laws. We do not discriminate on the basis of race, color, national origin, age, disability, sex, sexual orientation, or gender identity.            Thank you!     Thank you for choosing [facility]  for your care. Our goal is always to provide you with excellent care. Hearing back from our patients is one way we can continue to improve our services. Please take a few minutes to complete the written survey that you may receive in the mail after " your visit with us. Thank you!             Your Updated Medication List - Protect others around you: Learn how to safely use, store and throw away your medicines at www.disposemymeds.org.          This list is accurate as of 5/22/18  3:01 PM.  Always use your most recent med list.                   Brand Name Dispense Instructions for use Diagnosis    amLODIPine 5 MG tablet    NORVASC    90 tablet    Take 1 tablet (5 mg) by mouth daily    Essential hypertension, benign       ARTHRITIS PAIN RELIEVER 650 MG CR tablet   Generic drug:  acetaminophen      Take 1 tablet (650 mg) by mouth nightly as needed for mild pain or fever    Cramp of limb       aspirin 81 MG tablet          1 tab po QD (Once per day)    Type II or unspecified type diabetes mellitus without mention of complication, not stated as uncontrolled       atorvastatin 20 MG tablet    LIPITOR    90 tablet    Take 1 tablet (20 mg) by mouth daily    Hyperlipidemia LDL goal <100       busPIRone 15 MG tablet    BUSPAR     1/2 tab daily as needed for anxiety (rare use)    Situational anxiety       fish oil-omega-3 fatty acids 1000 MG capsule      3 qd        irbesartan-hydrochlorothiazide 300-12.5 MG per tablet    AVALIDE    90 tablet    Take 1 tablet by mouth daily    Essential hypertension, benign       NEW MED     1    One Touch Lancet device    Type II or unspecified type diabetes mellitus without mention of complication, not stated as uncontrolled       omeprazole 20 MG tablet     180 tablet    Take 1 tablet (20 mg) by mouth 2 times daily Take 30-60 minutes before a meal.    Gastroesophageal reflux disease without esophagitis       ONETOUCH ULTRA test strip   Generic drug:  blood glucose monitoring     50 strip    USE TO TEST ONCE DAILY    Type 2 diabetes mellitus with diabetic chronic kidney disease (H)       order for DME     50 strip    One Touch Ultra test strips. Checking sugars daily    Type 2 diabetes, HbA1c goal < 7% (H)

## 2018-05-22 NOTE — PATIENT INSTRUCTIONS
We will check orthostatics today.    ----    Labs to follow.    ---    If all are normal, recommend physical therapy for balance and stability.

## 2018-05-22 NOTE — PROGRESS NOTES
"  SUBJECTIVE:                                                      HPI: Janet Cope is a pleasant 83 year old female who presents with dizziness:    Accompanied by .    - ongoing for several months  - was better for a while, but now worse again  - further describes dizziness as \"not having confidence walking\" or gait instability; has to hold onto   - also describes dizziness as light-headedness    - no vertigo  - no presyncope or syncope  - no falls or near falls  - no chest pain or palpitations  - no shortness of breath  - no numbness, tingling, or weakness    PMH significant for HTN on amlodipine 5mg daily and irbesartan-hydrochlorothiazide 300-12.5mg.    Patient reports that she stays well-hydrated during the day.    Patient underwent an extensive lab and cardiac work-up for this concern in April (last month) in [place], [place]. We do not have those records for review, but patient reports that the work-up was generally normal and negative. No cause of symptoms identified.     The medication, allergy, and problem lists have been reviewed and updated as appropriate.       OBJECTIVE:                                                      /66 (BP Location: Left arm, Patient Position: Chair, Cuff Size: Adult Regular)  Pulse 84  Temp 98.4  F (36.9  C) (Oral)  Resp 20  Ht 5' 7\" (1.702 m)  Wt 166 lb 1.6 oz (75.3 kg)  SpO2 98%  BMI 26.01 kg/m2    Lying down: 133/70, 74  Sitting up: 136/70, 78  Standin/65, 98    Constitutional: well-appearing  Respiratory: normal respiratory effort; clear to auscultation bilaterally  Cardiovascular: regular rate and rhythm; no edema  Gastrointestinal: soft, non-tender, non-distended, and bowel sounds present; no organomegaly or masses   Musculoskeletal: looks for and receives assistance with walking and transfers  Psych: normal judgment and insight; normal mood and affect; recent and remote memory intact      ASSESSMENT/PLAN:                                 "                      (R42) Dizziness  (primary encounter diagnosis)  (R26.81) Gait instability  (R42) Lightheadedness  (Z13.21) Encounter for vitamin deficiency screening  (Z13.29) Screening for thyroid disorder  (E55.9) Vitamin D deficiency  Comment:    - suspect orthostatic hypotension.    - will evaluate for alternative causes (anemia, metabolic disorder, and vitamin B12 and D levels).   Plan:    - CBC, CMP, TSH reflex, vitamin B12 level, and vitamin D level.   - if labs generally unrevealing will recommend...    - STOPPING hydrochlorothiazide and...    - DECREASING dose of irbesartan.     The instructions on the AVS were discussed and explained to the patient. Patient expressed understanding of instructions.    (Chart documentation was completed, in part, with better. voice-recognition software. Even though reviewed, some grammatical, spelling, and word errors may remain.)    Jaja Li MD   [address] [address]  T: [phone], F: [phone]

## 2018-05-23 LAB — DEPRECATED CALCIDIOL+CALCIFEROL SERPL-MC: 23 UG/L (ref 20–75)

## 2018-05-24 ENCOUNTER — TELEPHONE (OUTPATIENT)
Dept: INTERNAL MEDICINE | Facility: CLINIC | Age: 83
End: 2018-05-24

## 2018-05-24 DIAGNOSIS — I10 ESSENTIAL HYPERTENSION, BENIGN: ICD-10-CM

## 2018-05-24 RX ORDER — LOSARTAN POTASSIUM 50 MG/1
TABLET ORAL
Qty: 90 TABLET | Refills: 0 | OUTPATIENT
Start: 2018-05-24

## 2018-05-24 RX ORDER — LOSARTAN POTASSIUM 50 MG/1
50 TABLET ORAL DAILY
Qty: 30 TABLET | Refills: 0 | Status: SHIPPED | OUTPATIENT
Start: 2018-05-24 | End: 2018-10-16 | Stop reason: DRUGHIGH

## 2018-05-24 RX ORDER — AMLODIPINE BESYLATE 5 MG/1
10 TABLET ORAL DAILY
COMMUNITY
Start: 2018-05-24 | End: 2018-06-04

## 2018-05-24 NOTE — TELEPHONE ENCOUNTER
STOP irbesartan-hydrochlorothiazide.    ---    Already on amlodipine 10mg daily.    ---    START losartan 50mg daily.    ---    Follow-up with me or Dr. Kovacs in 7-10 days (earlier as needed).    Will recheck blood pressure, orthostatics, symptoms, and BMP at that visit.     ---    Discussed with patient and .

## 2018-05-24 NOTE — TELEPHONE ENCOUNTER
"Requested Prescriptions   Pending Prescriptions Disp Refills     losartan (COZAAR) 50 MG tablet [Pharmacy Med Name: LOSARTAN 50MG TABLETS] 90 tablet 0     Sig: TAKE 1 TABLET(50 MG) BY MOUTH DAILY    Angiotensin-II Receptors Failed    5/24/2018  8:42 AM       Failed - Blood pressure under 140/90 in past 12 months    BP Readings from Last 3 Encounters:   05/22/18 140/66   04/24/18 128/76   05/15/17 130/78                Failed - Normal serum creatinine on file in past 12 months    Recent Labs   Lab Test  05/22/18   1526   CR  1.35*            Passed - Recent (12 mo) or future (30 days) visit within the authorizing provider's specialty    Patient had office visit in the last 12 months or has a visit in the next 30 days with authorizing provider or within the authorizing provider's specialty.  See \"Patient Info\" tab in inbasket, or \"Choose Columns\" in Meds & Orders section of the refill encounter.           Passed - Patient is age 18 or older       Passed - No active pregnancy on record       Passed - Normal serum potassium on file in past 12 months    Recent Labs   Lab Test  05/22/18   1526   POTASSIUM  3.9                   Passed - No positive pregnancy test in past 12 months        Routing refill request to provider for review/approval because:  Labs out of range:  Creatinine, blood pressure        "

## 2018-05-30 DIAGNOSIS — E78.5 HYPERLIPIDEMIA LDL GOAL <100: ICD-10-CM

## 2018-05-30 RX ORDER — ATORVASTATIN CALCIUM 20 MG/1
TABLET, FILM COATED ORAL
Qty: 90 TABLET | Refills: 0 | OUTPATIENT
Start: 2018-05-30

## 2018-06-04 ENCOUNTER — OFFICE VISIT (OUTPATIENT)
Dept: INTERNAL MEDICINE | Facility: CLINIC | Age: 83
End: 2018-06-04
Payer: MEDICARE

## 2018-06-04 VITALS
WEIGHT: 165 LBS | SYSTOLIC BLOOD PRESSURE: 138 MMHG | TEMPERATURE: 98.4 F | DIASTOLIC BLOOD PRESSURE: 70 MMHG | RESPIRATION RATE: 16 BRPM | HEART RATE: 88 BPM | BODY MASS INDEX: 25.84 KG/M2

## 2018-06-04 DIAGNOSIS — I10 ESSENTIAL HYPERTENSION, BENIGN: ICD-10-CM

## 2018-06-04 DIAGNOSIS — N18.30 CKD (CHRONIC KIDNEY DISEASE) STAGE 3, GFR 30-59 ML/MIN (H): Primary | ICD-10-CM

## 2018-06-04 PROCEDURE — 99214 OFFICE O/P EST MOD 30 MIN: CPT | Performed by: INTERNAL MEDICINE

## 2018-06-04 RX ORDER — AMLODIPINE BESYLATE 5 MG/1
5 TABLET ORAL DAILY
Qty: 30 TABLET | Refills: 11 | Status: SHIPPED | OUTPATIENT
Start: 2018-06-04 | End: 2018-10-16

## 2018-06-04 RX ORDER — LOSARTAN POTASSIUM 100 MG/1
100 TABLET ORAL DAILY
Qty: 90 TABLET | Refills: 3 | Status: SHIPPED | OUTPATIENT
Start: 2018-06-04 | End: 2019-04-23 | Stop reason: DRUGHIGH

## 2018-06-04 ASSESSMENT — PAIN SCALES - GENERAL: PAINLEVEL: NO PAIN (0)

## 2018-06-04 NOTE — MR AVS SNAPSHOT
After Visit Summary   6/4/2018    Janet Cope    MRN: [id number]           Patient Information     Date Of Birth          8/7/1934        Visit Information        Provider Department      6/4/2018 9:00 AM Frank Kovacs MD [facility]        Care Instructions     Increase Losartan to 50mg tab, 2 tabs (100mg) daily for blood pressure until run out. Then change to 100mg tab, 1 tab daily (on file at pharmacy)  Reduce Amlodipine to 5mg tab, 1 tab daily for blood pressure   Call clinic in 1 week with blood pressure twice a day for 2 days and update re: leg swelling or earlier if running out of Amlodipine  Continue other medications  Nonfasting  BMP kidney lab in 2 weeks          Follow-ups after your visit        Who to contact     If you have questions or need follow up information about today's clinic visit or your schedule please contact [facility] directly at [phone].  Normal or non-critical lab and imaging results will be communicated to you by MyChart, letter or phone within 4 business days after the clinic has received the results. If you do not hear from us within 7 days, please contact the clinic through MyChart or phone. If you have a critical or abnormal lab result, we will notify you by phone as soon as possible.  Submit refill requests through [employer] or call your pharmacy and they will forward the refill request to us. Please allow 3 business days for your refill to be completed.          Additional Information About Your Visit        Care EveryWhere ID     This is your Care EveryWhere ID. This could be used by other organizations to access your [place] medical records  [id number]        Your Vitals Were     Pulse Temperature Respirations BMI (Body Mass Index)          88 98.4  F (36.9  C) (Oral) 16 25.84 kg/m2         Blood Pressure from Last 3 Encounters:   06/04/18 138/70   05/22/18 140/66   04/24/18 128/76    Weight from Last 3  Encounters:   06/04/18 165 lb (74.8 kg)   05/22/18 166 lb 1.6 oz (75.3 kg)   04/24/18 164 lb (74.4 kg)              Today, you had the following     No orders found for display       Primary Care Provider Office Phone # Fax #    Frank Kovacs [phone]       [address] [facility] [address]        Equal Access to Services     MIREYA SILVEIRA : Hadii aad ku hadasho Soomaali, waaxda luqadaha, qaybta kaalmada adeegyada, waxay idiin hayaan adeeg kharash laemmanuel . So [facility] [phone].    ATENCIÓN: Si lillian ramon, tiene a sargent disposición servicios gratuitos de asistencia lingüística. Llame al [phone].    We comply with applicable federal civil rights laws and [place] laws. We do not discriminate on the basis of race, color, national origin, age, disability, sex, sexual orientation, or gender identity.            Thank you!     Thank you for choosing [facility]  for your care. Our goal is always to provide you with excellent care. Hearing back from our patients is one way we can continue to improve our services. Please take a few minutes to complete the written survey that you may receive in the mail after your visit with us. Thank you!             Your Updated Medication List - Protect others around you: Learn how to safely use, store and throw away your medicines at www.disposemymeds.org.          This list is accurate as of 6/4/18  9:36 AM.  Always use your most recent med list.                   Brand Name Dispense Instructions for use Diagnosis    amLODIPine 5 MG tablet    NORVASC     Take 2 tablets (10 mg) by mouth daily    Essential hypertension, benign       ARTHRITIS PAIN RELIEVER 650 MG CR tablet   Generic drug:  acetaminophen      Take 1 tablet (650 mg) by mouth nightly as needed for mild pain or fever    Cramp of limb       aspirin 81 MG tablet          1 tab po QD (Once per day)    Type II or unspecified type diabetes mellitus without mention of complication,  not stated as uncontrolled       atorvastatin 20 MG tablet    LIPITOR    90 tablet    Take 1 tablet (20 mg) by mouth daily    Hyperlipidemia LDL goal <100       busPIRone 15 MG tablet    BUSPAR     1/2 tab daily as needed for anxiety (rare use)    Situational anxiety       fish oil-omega-3 fatty acids 1000 MG capsule      3 qd        losartan 50 MG tablet    COZAAR    30 tablet    Take 1 tablet (50 mg) by mouth daily    Essential hypertension, benign       NEW MED     1    One Touch Lancet device    Type II or unspecified type diabetes mellitus without mention of complication, not stated as uncontrolled       omeprazole 20 MG tablet     180 tablet    Take 1 tablet (20 mg) by mouth 2 times daily Take 30-60 minutes before a meal.    Gastroesophageal reflux disease without esophagitis       ONETOUCH ULTRA test strip   Generic drug:  blood glucose monitoring     50 strip    USE TO TEST ONCE DAILY    Type 2 diabetes mellitus with diabetic chronic kidney disease (H)       order for DME     50 strip    One Touch Ultra test strips. Checking sugars daily    Type 2 diabetes, HbA1c goal < 7% (H)

## 2018-06-04 NOTE — PROGRESS NOTES
"  SUBJECTIVE:   Janet Cope is a 83 year old female who presents to clinic today for the following health issues:  Chief Complaint   Patient presents with     Hypertension         Hypertension Follow-up      Outpatient blood pressures are being checked at home.  Results are 120's to 150's/ 70's to 80's.    Low Salt Diet: low salt      Amount of exercise or physical activity: Minimal    Problems taking medications regularly: No    Medication side effects: none    Diet: regular (no restrictions) and low salt      Pt's past medical history, family history, habits, medications and allergies were reviewed with the patient today.  See snap shot for  HCM status. Most recent lab results reviewed with pt. Problem list and histories reviewed & adjusted, as indicated.  Additional history as below:    Pt was recently seen by Dr Li  for orthostasis. BP meds changed. Note reviewed  Denies chest pain, shortness of breath, abdominal pain, headache, vision changes or side effects with medications.   BPs at home higher since off HCTZ but prior lightheadedness/orthostasis resolved  GFR worsened more recently ? related to prior dehydration with HCTZ  Edema BLE end of the day since increased Amlodipine to 10mg daily and off HCTZ and with lower dose ARB. No orthopnea, PND. No change in low salt diet     Additional ROS:   Constitutional, HEENT, Cardiovascular, Pulmonary, GI and , Neuro, MSK and Psych review of systems/symptoms are otherwise negative or unchanged from previous, except as noted above.      OBJECTIVE:  /70  Pulse 88  Temp 98.4  F (36.9  C) (Oral)  Resp 16  Wt 165 lb (74.8 kg)  BMI 25.84 kg/m2   Estimated body mass index is 25.84 kg/(m^2) as calculated from the following:    Height as of 5/22/18: 5' 7\" (1.702 m).    Weight as of this encounter: 165 lb (74.8 kg).    Neck: no adenopathy. Thyroid normal to palpation. No bruits  Pulm: Lungs clear to auscultation   CV: Regular rates and rhythm  GI: Soft, " nontender, Normal active bowel sounds, No hepatosplenomegaly or masses palpable  Ext: Peripheral pulses intact. 1 plus BLE edema.     Assessment/Plan: (See plan discussion below for further details)  1. BENIGN HYPERTENSION  controlled but has edema with higher dose Amlodipine. See plan discussion below.   - amLODIPine (NORVASC) 5 MG tablet; Take 1 tablet (5 mg) by mouth daily  Dispense: 30 tablet; Refill: 11  - losartan (COZAAR) 100 MG tablet; Take 1 tablet (100 mg) by mouth daily  Dispense: 90 tablet; Refill: 3  - Basic metabolic panel; Future    2. CKD (chronic kidney disease) stage 3, GFR 30-59 ml/min  Lab as ordered  - Basic metabolic panel; Future    Plan discussion:   Increase Losartan to 50mg tab, 2 tabs (100mg) daily for blood pressure until run out. Then change to 100mg tab, 1 tab daily (on file at pharmacy)  Reduce Amlodipine to 5mg tab, 1 tab daily for blood pressure   Call clinic in 1 week with blood pressure twice a day for 2 days and update re: leg swelling or earlier if running out of Amlodipine  Continue other medications  Nonfasting  BMP kidney lab in 2 weeks       Frank Kovacs MD  Internal Medicine Department  [facility]

## 2018-06-04 NOTE — PATIENT INSTRUCTIONS
Increase Losartan to 50mg tab, 2 tabs (100mg) daily for blood pressure until run out. Then change to 100mg tab, 1 tab daily (on file at pharmacy)  Reduce Amlodipine to 5mg tab, 1 tab daily for blood pressure   Call clinic in 1 week with blood pressure twice a day for 2 days and update re: leg swelling or earlier if running out of Amlodipine  Continue other medications  Nonfasting  BMP kidney lab in 2 weeks

## 2018-06-11 ENCOUNTER — TELEPHONE (OUTPATIENT)
Dept: INTERNAL MEDICINE | Facility: CLINIC | Age: 83
End: 2018-06-11

## 2018-06-11 NOTE — TELEPHONE ENCOUNTER
Pt was seen on 6/4/18, and she is now calling to give PCP update:  --Says she started the new medication, amLODIPine (NORVASC) 5 MG tablet & losartan (COZAAR) 100 MG tablet,  and the Swelling in left foot has slightly improved, and for past week the BP is averaged 125 (improved)  And overall she feels better. And in 1 week she has a non-fasting BMP kidney lab appt scheduled.    (previous BP readings below)  BP Readings from Last 3 Encounters:   06/04/18 138/70   05/22/18 140/66   04/24/18 128/76

## 2018-06-18 DIAGNOSIS — I10 ESSENTIAL HYPERTENSION, BENIGN: ICD-10-CM

## 2018-06-18 DIAGNOSIS — N18.30 CKD (CHRONIC KIDNEY DISEASE) STAGE 3, GFR 30-59 ML/MIN (H): ICD-10-CM

## 2018-06-18 LAB
ANION GAP SERPL CALCULATED.3IONS-SCNC: 5 MMOL/L (ref 3–14)
BUN SERPL-MCNC: 24 MG/DL (ref 7–30)
CALCIUM SERPL-MCNC: 9.5 MG/DL (ref 8.5–10.1)
CHLORIDE SERPL-SCNC: 108 MMOL/L (ref 94–109)
CO2 SERPL-SCNC: 27 MMOL/L (ref 20–32)
CREAT SERPL-MCNC: 1.03 MG/DL (ref 0.52–1.04)
GFR SERPL CREATININE-BSD FRML MDRD: 51 ML/MIN/1.7M2
GLUCOSE SERPL-MCNC: 103 MG/DL (ref 70–99)
POTASSIUM SERPL-SCNC: 4.3 MMOL/L (ref 3.4–5.3)
SODIUM SERPL-SCNC: 140 MMOL/L (ref 133–144)

## 2018-06-18 PROCEDURE — 36415 COLL VENOUS BLD VENIPUNCTURE: CPT | Performed by: INTERNAL MEDICINE

## 2018-06-18 PROCEDURE — 80048 BASIC METABOLIC PNL TOTAL CA: CPT | Performed by: INTERNAL MEDICINE

## 2018-08-13 DIAGNOSIS — K21.9 GASTROESOPHAGEAL REFLUX DISEASE WITHOUT ESOPHAGITIS: ICD-10-CM

## 2018-08-13 NOTE — TELEPHONE ENCOUNTER
"Requested Prescriptions   Pending Prescriptions Disp Refills     omeprazole (PRILOSEC) 20 MG CR capsule [Pharmacy Med Name: OMEPRAZOLE 20MG CAPSULES] 180 capsule 0    Last Written Prescription Date:  5/17/17  Last Fill Quantity: 180,  # refills: 3   Last office visit: 6/4/2018 with prescribing provider:  6/4/18   Future Office Visit:     Sig: TAKE 1 CAPSULE BY MOUTH TWICE DAILY 30 TO 60 MINUTES BEFORE A MEAL    PPI Protocol Failed    8/13/2018 10:17 AM       Failed - No diagnosis of osteoporosis on record       Passed - Not on Clopidogrel (unless Pantoprazole ordered)       Passed - Recent (12 mo) or future (30 days) visit within the authorizing provider's specialty    Patient had office visit in the last 12 months or has a visit in the next 30 days with authorizing provider or within the authorizing provider's specialty.  See \"Patient Info\" tab in inbasket, or \"Choose Columns\" in Meds & Orders section of the refill encounter.           Passed - Patient is age 18 or older       Passed - No active pregnacy on record       Passed - No positive pregnancy test in past 12 months          "

## 2018-08-14 NOTE — TELEPHONE ENCOUNTER
Routing refill request to provider for review/approval because:       Failed - No diagnosis of osteoporosis on record

## 2018-08-15 NOTE — TELEPHONE ENCOUNTER
Call pt. Would like her to try and reduce Omeprazole dosing to once a day in the AM to see if control acid reflux sx well with lower dose. If recurrent issues with lower dose, then pt to contact clinic to let me know. Med refill done

## 2018-10-08 DIAGNOSIS — E78.5 HYPERLIPIDEMIA LDL GOAL <100: ICD-10-CM

## 2018-10-08 DIAGNOSIS — N18.30 TYPE 2 DIABETES MELLITUS WITH STAGE 3 CHRONIC KIDNEY DISEASE, WITHOUT LONG-TERM CURRENT USE OF INSULIN (H): ICD-10-CM

## 2018-10-08 DIAGNOSIS — E11.22 TYPE 2 DIABETES MELLITUS WITH STAGE 3 CHRONIC KIDNEY DISEASE, WITHOUT LONG-TERM CURRENT USE OF INSULIN (H): ICD-10-CM

## 2018-10-08 DIAGNOSIS — N18.30 CKD (CHRONIC KIDNEY DISEASE) STAGE 3, GFR 30-59 ML/MIN (H): ICD-10-CM

## 2018-10-08 LAB
ALBUMIN SERPL-MCNC: 3.6 G/DL (ref 3.4–5)
ALP SERPL-CCNC: 75 U/L (ref 40–150)
ALT SERPL W P-5'-P-CCNC: 22 U/L (ref 0–50)
ANION GAP SERPL CALCULATED.3IONS-SCNC: 7 MMOL/L (ref 3–14)
AST SERPL W P-5'-P-CCNC: 19 U/L (ref 0–45)
BILIRUB SERPL-MCNC: 0.5 MG/DL (ref 0.2–1.3)
BUN SERPL-MCNC: 26 MG/DL (ref 7–30)
CALCIUM SERPL-MCNC: 9.5 MG/DL (ref 8.5–10.1)
CHLORIDE SERPL-SCNC: 106 MMOL/L (ref 94–109)
CHOLEST SERPL-MCNC: 229 MG/DL
CO2 SERPL-SCNC: 26 MMOL/L (ref 20–32)
CREAT SERPL-MCNC: 1.21 MG/DL (ref 0.52–1.04)
CREAT UR-MCNC: 25 MG/DL
ERYTHROCYTE [DISTWIDTH] IN BLOOD BY AUTOMATED COUNT: 14.2 % (ref 10–15)
GFR SERPL CREATININE-BSD FRML MDRD: 42 ML/MIN/1.7M2
GLUCOSE SERPL-MCNC: 101 MG/DL (ref 70–99)
HBA1C MFR BLD: 6 % (ref 0–5.6)
HCT VFR BLD AUTO: 38.4 % (ref 35–47)
HDLC SERPL-MCNC: 76 MG/DL
HGB BLD-MCNC: 12.3 G/DL (ref 11.7–15.7)
LDLC SERPL CALC-MCNC: 136 MG/DL
MCH RBC QN AUTO: 28.3 PG (ref 26.5–33)
MCHC RBC AUTO-ENTMCNC: 32 G/DL (ref 31.5–36.5)
MCV RBC AUTO: 89 FL (ref 78–100)
MICROALBUMIN UR-MCNC: 9 MG/L
MICROALBUMIN/CREAT UR: 36.84 MG/G CR (ref 0–25)
NONHDLC SERPL-MCNC: 153 MG/DL
PLATELET # BLD AUTO: 251 10E9/L (ref 150–450)
POTASSIUM SERPL-SCNC: 4.4 MMOL/L (ref 3.4–5.3)
PROT SERPL-MCNC: 8 G/DL (ref 6.8–8.8)
RBC # BLD AUTO: 4.34 10E12/L (ref 3.8–5.2)
SODIUM SERPL-SCNC: 139 MMOL/L (ref 133–144)
TRIGL SERPL-MCNC: 86 MG/DL
TSH SERPL DL<=0.005 MIU/L-ACNC: 1.69 MU/L (ref 0.4–4)
WBC # BLD AUTO: 5.3 10E9/L (ref 4–11)

## 2018-10-08 PROCEDURE — 85027 COMPLETE CBC AUTOMATED: CPT | Performed by: INTERNAL MEDICINE

## 2018-10-08 PROCEDURE — 84443 ASSAY THYROID STIM HORMONE: CPT | Performed by: INTERNAL MEDICINE

## 2018-10-08 PROCEDURE — 36415 COLL VENOUS BLD VENIPUNCTURE: CPT | Performed by: INTERNAL MEDICINE

## 2018-10-08 PROCEDURE — 80053 COMPREHEN METABOLIC PANEL: CPT | Performed by: INTERNAL MEDICINE

## 2018-10-08 PROCEDURE — 80061 LIPID PANEL: CPT | Performed by: INTERNAL MEDICINE

## 2018-10-08 PROCEDURE — 82043 UR ALBUMIN QUANTITATIVE: CPT | Performed by: INTERNAL MEDICINE

## 2018-10-08 PROCEDURE — 83036 HEMOGLOBIN GLYCOSYLATED A1C: CPT | Performed by: INTERNAL MEDICINE

## 2018-10-11 ENCOUNTER — TELEPHONE (OUTPATIENT)
Dept: INTERNAL MEDICINE | Facility: CLINIC | Age: 83
End: 2018-10-11

## 2018-10-11 NOTE — TELEPHONE ENCOUNTER
Reason for Call:  Other appointment and call back    Detailed comments: she is wondering if she needs to get her second shot for shingles, wanted to talk to a nurse about the process for that.    Phone Number Patient can be reached at: Home number on file [phone] (home)    Best Time: any    Can we leave a detailed message on this number? YES    Call taken on 10/11/2018 at 9:37 AM by Kaden Bell

## 2018-10-12 NOTE — TELEPHONE ENCOUNTER
Called patient and let her know that there is a new shingles shot that is 2 shots and that she need to check with her insurance if it is covered.

## 2018-10-16 ENCOUNTER — OFFICE VISIT (OUTPATIENT)
Dept: INTERNAL MEDICINE | Facility: CLINIC | Age: 83
End: 2018-10-16
Payer: MEDICARE

## 2018-10-16 VITALS
RESPIRATION RATE: 16 BRPM | WEIGHT: 168 LBS | DIASTOLIC BLOOD PRESSURE: 70 MMHG | BODY MASS INDEX: 26.31 KG/M2 | TEMPERATURE: 97.8 F | HEART RATE: 80 BPM | OXYGEN SATURATION: 98 % | SYSTOLIC BLOOD PRESSURE: 136 MMHG

## 2018-10-16 DIAGNOSIS — Z23 NEED FOR PROPHYLACTIC VACCINATION AND INOCULATION AGAINST INFLUENZA: ICD-10-CM

## 2018-10-16 DIAGNOSIS — K21.9 GASTROESOPHAGEAL REFLUX DISEASE WITHOUT ESOPHAGITIS: ICD-10-CM

## 2018-10-16 DIAGNOSIS — F32.0 MILD MAJOR DEPRESSION (H): ICD-10-CM

## 2018-10-16 DIAGNOSIS — N18.30 TYPE 2 DIABETES MELLITUS WITH STAGE 3 CHRONIC KIDNEY DISEASE, WITHOUT LONG-TERM CURRENT USE OF INSULIN (H): Primary | ICD-10-CM

## 2018-10-16 DIAGNOSIS — E78.5 HYPERLIPIDEMIA LDL GOAL <100: ICD-10-CM

## 2018-10-16 DIAGNOSIS — I10 ESSENTIAL HYPERTENSION, BENIGN: ICD-10-CM

## 2018-10-16 DIAGNOSIS — E11.22 TYPE 2 DIABETES MELLITUS WITH STAGE 3 CHRONIC KIDNEY DISEASE, WITHOUT LONG-TERM CURRENT USE OF INSULIN (H): Primary | ICD-10-CM

## 2018-10-16 DIAGNOSIS — N18.30 CKD (CHRONIC KIDNEY DISEASE) STAGE 3, GFR 30-59 ML/MIN (H): ICD-10-CM

## 2018-10-16 PROCEDURE — 99207 C FOOT EXAM  NO CHARGE: CPT | Mod: 25 | Performed by: INTERNAL MEDICINE

## 2018-10-16 PROCEDURE — G0008 ADMIN INFLUENZA VIRUS VAC: HCPCS | Performed by: INTERNAL MEDICINE

## 2018-10-16 PROCEDURE — 99214 OFFICE O/P EST MOD 30 MIN: CPT | Mod: 25 | Performed by: INTERNAL MEDICINE

## 2018-10-16 PROCEDURE — 90662 IIV NO PRSV INCREASED AG IM: CPT | Performed by: INTERNAL MEDICINE

## 2018-10-16 RX ORDER — LOSARTAN POTASSIUM 50 MG/1
50 TABLET ORAL DAILY
Qty: 90 TABLET | Refills: 3 | Status: SHIPPED | OUTPATIENT
Start: 2018-10-16 | End: 2019-04-24

## 2018-10-16 RX ORDER — AMLODIPINE BESYLATE 5 MG/1
5 TABLET ORAL DAILY
Qty: 90 TABLET | Refills: 3 | Status: SHIPPED | OUTPATIENT
Start: 2018-10-16 | End: 2019-04-24

## 2018-10-16 NOTE — PROGRESS NOTES
SUBJECTIVE:   Janet Cope is a 84 year old female who presents to clinic today for the following health issues:    Chief Complaint   Patient presents with     Hypertension     Diabetes     Hyperlipidemia     Diabetes Follow-up      Patient is checking blood sugars: rarely.   Results < 140 later in the day    Diabetic concerns: None and other - Prilosec, Patient takes two a day instead of one     Symptoms of hypoglycemia (low blood sugar): none     Paresthesias (numbness or burning in feet) or sores: No     Date of last diabetic eye exam: No Eye Exam on File    Diabetes Management Resources    Hyperlipidemia Follow-Up      Rate your low fat/cholesterol diet?: good    Taking statin?  No    Other lipid medications/supplements?:  none    Hypertension Follow-up      Outpatient blood pressures are not being checked.    Low Salt Diet: low salt    BP Readings from Last 2 Encounters:   06/04/18 138/70   05/22/18 140/66     Hemoglobin A1C (%)   Date Value   10/08/2018 6.0 (H)   04/17/2018 6.0 (H)     LDL Cholesterol Calculated (mg/dL)   Date Value   10/08/2018 136 (H)   09/07/2017 82       Amount of exercise or physical activity: Minimal, Take dog for walk 3 x daily    Problems taking medications regularly: No    Medication side effects: none    Diet: low salt    Lab Results   Component Value Date     10/08/2018     Lab Results   Component Value Date    A1C 6.0 10/08/2018     Lab Results   Component Value Date    CHOL 229 10/08/2018     Lab Results   Component Value Date     10/08/2018     Lab Results   Component Value Date    HDL 76 10/08/2018     Lab Results   Component Value Date    TRIG 86 10/08/2018     Lab Results   Component Value Date    CR 1.21 10/08/2018     Lab Results   Component Value Date    ALT 22 10/08/2018     Lab Results   Component Value Date    AST 19 10/08/2018     Lab Results   Component Value Date    MICROL 9 10/08/2018     Lab Results   Component Value Date    TSH 1.69 10/08/2018        Denies CP, SOB, abdominal pain, polyuria, polydipsia, vision changes, extremity numbness/parasthesias or skin problems.  Cholesterol numbers elevated from previous.  Weight has been stable and patient states she is following the same diet.  Patient believes that she is currently taking atorvastatin 20 mg daily as previous.  Has some mild fatigue in the morning.  No known snoring.  Patient's  has poor hearing so would not know if the patient is snoring at night however.  Patient states sleep is interrupted by occasional arthritis pains.  If she takes an extra strength Tylenol in the middle the night, then the pain resolves and able to sleep well.  Patient denies depression symptoms currently/ PHQ=3.  Last eye exam was in [place] in April prior to coming back to [place].  Patient will be wintering in [place] this year as she and  sold their home in [place]. Patient tried reducing omeprazole to 20 mill grams daily but had significant increased heartburn issues at that dose.  Increase dose back to 20 mg twice a day and symptoms resolved.  Patient has 1 glass of wine per day.  Rare caffeine use.  Patient states she enjoys having 1 glass of wine while watching Jeopardy daily and would rather take the PPI medication twice a day rather than give up the glass of wine      Pt's past medical history, family history, habits, medications and allergies were reviewed with the patient today.  See snap shot for  HCM status. Most recent lab results reviewed with pt. Problem list and histories reviewed & adjusted, as indicated.  Additional history as below:       Additional ROS:   Constitutional, HEENT, Cardiovascular, Pulmonary, GI and , Neuro, MSK and Psych review of systems/symptoms are otherwise negative or unchanged from previous, except as noted above.      OBJECTIVE:  /70  Pulse 80  Temp 97.8  F (36.6  C) (Oral)  Resp 16  Wt 168 lb (76.2 kg)  SpO2 98%  BMI 26.31 kg/m2   Estimated body mass  "index is 26.31 kg/(m^2) as calculated from the following:    Height as of 5/22/18: 5' 7\" (1.702 m).    Weight as of this encounter: 168 lb (76.2 kg).    Neck: no adenopathy. Thyroid normal to palpation. No bruits  Pulm: Lungs clear to auscultation   CV: Regular rates and rhythm  GI: Soft, nontender, Normal active bowel sounds, No hepatosplenomegaly or masses palpable  Ext: Peripheral pulses intact. No edema. DIP and PIP joints hands with osteoarthritis changes. Minimal tenderness to FROM bilateral shoulders. No hip joint tenderness to ROM  Neuro: Normal strength and tone, sensory exam grossly normal  Gen: Normal appearing affect. Enjoys talking about her dog Marixa    Assessment/Plan: (See plan discussion below for further details)  1. Type 2 diabetes mellitus with stage 3 chronic kidney disease, without long-term current use of insulin (H)  Controlled.  Continue diet.  Recheck labs 6 months  - Basic metabolic panel; Future  - Hemoglobin A1c; Future    2. BENIGN HYPERTENSION  Controlled.  Continue current medication  - amLODIPine (NORVASC) 5 MG tablet; Take 1 tablet (5 mg) by mouth daily  Dispense: 90 tablet; Refill: 3  - losartan (COZAAR) 50 MG tablet; Take 1 tablet (50 mg) by mouth daily  Dispense: 90 tablet; Refill: 3    3. Mild major depression (H)   PHQ=3 that mostly relates to mild  Fatigue that is chronic.  Has not had to use buspirone recently for anxiety symptoms.  Will monitor.  Patient denies need for further treatment at this time    4. Gastroesophageal reflux disease without esophagitis  Controlled as long as patient taking medication twice a day.  Has recurrent reflux with lower dosage  - omeprazole (PRILOSEC) 20 MG CR capsule; Take 1 capsule (20 mg) by mouth 2 times daily  Dispense: 180 capsule; Refill: 3    5. CKD (chronic kidney disease) stage 3, GFR 30-59 ml/min (H)  Overall stable.  Repeat labs 6 months  - Basic metabolic panel; Future    6. Hyperlipidemia LDL goal <100  Lipids above goal.  Spoke " with pharmacy and patient last filled in March 2018.  Patient inadvertently not taking that medication.  Prescription refill sent to the patient's pharmacy and will repeat lipids lab fasting in 1 month  - atorvastatin (LIPITOR) 20 MG tablet; Take 1 tablet (20 mg) by mouth daily  Dispense: 90 tablet; Refill: 3  - Lipid panel reflex to direct LDL Fasting; Future  - Hepatic panel; Future    7. Need for prophylactic vaccination and inoculation against influenza  - FLU VACCINE, INCREASED ANTIGEN, PRESV FREE, AGE 65+ [00354]  - ADMIN INFLUENZA  (For MEDICARE Patients ONLY) []    Plan discussion:  Continue current meds  Prescriptions refilled.    Vaccinations: Influenza   Check with insurance or speak with your pharmacist re: Shingrix vaccine coverage for shingles prevention.  This is a 2 shot series done 2-6 months apart  Restart atorvastatin 20 mg tablet, 1 tablet daily for cholesterol  Repeat fasting labs in 1 month  Consider taking 2 Tylenol ES before bedtime to see if better  sleep with less pain to see if helps energy during the day  Repeat nonfasting diabetic labs in 6 months    Frank Kovacs MD  Internal Medicine Department  [facility]            Injectable Influenza Immunization Documentation    1.  Is the person to be vaccinated sick today?   No    2. Does the person to be vaccinated have an allergy to a component   of the vaccine?   No  Egg Allergy Algorithm Link    3. Has the person to be vaccinated ever had a serious reaction   to influenza vaccine in the past?   No    4. Has the person to be vaccinated ever had Guillain-Barré syndrome?   No    Form completed by   [facility]

## 2018-10-16 NOTE — MR AVS SNAPSHOT
After Visit Summary   10/16/2018    Janet Cope    MRN: [id number]           Patient Information     Date Of Birth          8/7/1934        Visit Information        Provider Department      10/16/2018 8:30 AM Frank Kovacs MD [facility]        Today's Diagnoses     Type 2 diabetes mellitus with stage 3 chronic kidney disease, without long-term current use of insulin (H)    -  1    BENIGN HYPERTENSION        Screening for diabetic retinopathy        Screening for diabetic peripheral neuropathy        Need for prophylactic vaccination and inoculation against influenza        Gastroesophageal reflux disease without esophagitis        Hyperlipidemia LDL goal <100          Care Instructions    Continue current meds  Prescriptions refilled.    Vaccinations: Influenza   Check with insurance or speak with your pharmacist re: Shingrix vaccine coverage for shingles prevention.  This is a 2 shot series done 2-6 months apart  MD will speak with pharmacy re: last Atorvastatin/ Lipitor refill  To see if missing doses and will contact you tomorrow with plan   Consider taking 2 Tylenol ES before bedtime to see if better  Sleep with less pain to se if helps energy during the day            Follow-ups after your visit        Who to contact     If you have questions or need follow up information about today's clinic visit or your schedule please contact [facility] directly at [phone].  Normal or non-critical lab and imaging results will be communicated to you by MyChart, letter or phone within 4 business days after the clinic has received the results. If you do not hear from us within 7 days, please contact the clinic through MyChart or phone. If you have a critical or abnormal lab result, we will notify you by phone as soon as possible.  Submit refill requests through [place] or call your pharmacy and they will forward the refill request to us. Please allow 3  business days for your refill to be completed.          Additional Information About Your Visit        Care EveryWhere ID     This is your Care EveryWhere ID. This could be used by other organizations to access your [place] medical records  [id number]        Your Vitals Were     Pulse Temperature Respirations Pulse Oximetry BMI (Body Mass Index)       80 97.8  F (36.6  C) (Oral) 16 98% 26.31 kg/m2        Blood Pressure from Last 3 Encounters:   10/16/18 136/70   06/04/18 138/70   05/22/18 140/66    Weight from Last 3 Encounters:   10/16/18 168 lb (76.2 kg)   06/04/18 165 lb (74.8 kg)   05/22/18 166 lb 1.6 oz (75.3 kg)              We Performed the Following     ADMIN INFLUENZA  (For MEDICARE Patients ONLY) []     FLU VACCINE, INCREASED ANTIGEN, PRESV FREE, AGE 65+ [21864]     FOOT EXAM  NO CHARGE [47760.114]          Today's Medication Changes          These changes are accurate as of 10/16/18  9:04 AM.  If you have any questions, ask your nurse or doctor.               These medicines have changed or have updated prescriptions.        Dose/Directions    omeprazole 20 MG CR capsule   Commonly known as:  priLOSEC   This may have changed:  when to take this   Used for:  Gastroesophageal reflux disease without esophagitis   Changed by:  Frank Kovacs MD        Dose:  20 mg   Take 1 capsule (20 mg) by mouth 2 times daily   Quantity:  180 capsule   Refills:  3            Where to get your medicines      These medications were sent to [facility] [address] [place] [address] [address] AT [facility] [place]  [address], [facility] [address]     Phone:  [phone]     amLODIPine 5 MG tablet    losartan 50 MG tablet    omeprazole 20 MG CR capsule                Primary Care Provider Office Phone # Fax #    Frank Kovacs [phone]       [address] [facility] [address]        Equal Access to Services     MIREYA SILVEIRA AH: Jesus Sigala  wahernandezda luanabeladaha, qaybta kaalmada ananth, gregorio bucknerchantal ah. So [facility] [phone].    ATENCIÓN: Si lillian ramon, tiene a sargent disposición servicios gratuitos de asistencia lingüística. Hong al [phone].    We comply with applicable federal civil rights laws and [place] laws. We do not discriminate on the basis of race, color, national origin, age, disability, sex, sexual orientation, or gender identity.            Thank you!     Thank you for choosing [facility]  for your care. Our goal is always to provide you with excellent care. Hearing back from our patients is one way we can continue to improve our services. Please take a few minutes to complete the written survey that you may receive in the mail after your visit with us. Thank you!             Your Updated Medication List - Protect others around you: Learn how to safely use, store and throw away your medicines at www.disposemymeds.org.          This list is accurate as of 10/16/18  9:04 AM.  Always use your most recent med list.                   Brand Name Dispense Instructions for use Diagnosis    amLODIPine 5 MG tablet    NORVASC    90 tablet    Take 1 tablet (5 mg) by mouth daily    Essential hypertension, benign       ARTHRITIS PAIN RELIEVER 650 MG CR tablet   Generic drug:  acetaminophen      Take 1 tablet (650 mg) by mouth nightly as needed for mild pain or fever    Cramp of limb       aspirin 81 MG tablet          1 tab po QD (Once per day)    Type II or unspecified type diabetes mellitus without mention of complication, not stated as uncontrolled       atorvastatin 20 MG tablet    LIPITOR    90 tablet    Take 1 tablet (20 mg) by mouth daily    Hyperlipidemia LDL goal <100       busPIRone 15 MG tablet    BUSPAR     1/2 tab daily as needed for anxiety (rare use)    Situational anxiety       fish oil-omega-3 fatty acids 1000 MG capsule      3 qd        * losartan 100 MG tablet    COZAAR    90  tablet    Take 1 tablet (100 mg) by mouth daily    Essential hypertension, benign       * losartan 50 MG tablet    COZAAR    90 tablet    Take 1 tablet (50 mg) by mouth daily    Essential hypertension, benign       NEW MED     1    One Touch Lancet device    Type II or unspecified type diabetes mellitus without mention of complication, not stated as uncontrolled       omeprazole 20 MG CR capsule    priLOSEC    180 capsule    Take 1 capsule (20 mg) by mouth 2 times daily    Gastroesophageal reflux disease without esophagitis       ONETOUCH ULTRA test strip   Generic drug:  blood glucose monitoring     50 strip    USE TO TEST ONCE DAILY    Type 2 diabetes mellitus with diabetic chronic kidney disease (H)       order for DME     50 strip    One Touch Ultra test strips. Checking sugars daily    Type 2 diabetes, HbA1c goal < 7% (H)       * Notice:  This list has 2 medication(s) that are the same as other medications prescribed for you. Read the directions carefully, and ask your doctor or other care provider to review them with you.

## 2018-10-16 NOTE — PATIENT INSTRUCTIONS
Continue current meds  Prescriptions refilled.    Vaccinations: Influenza   Check with insurance or speak with your pharmacist re: Shingrix vaccine coverage for shingles prevention.  This is a 2 shot series done 2-6 months apart  Restart atorvastatin 20 mg tablet, 1 tablet daily for cholesterol  Repeat fasting labs in 1 month  Consider taking 2 Tylenol ES before bedtime to see if better  sleep with less pain to see if helps energy during the day  Repeat nonfasting diabetic labs in 6 months

## 2018-10-17 RX ORDER — ATORVASTATIN CALCIUM 20 MG/1
20 TABLET, FILM COATED ORAL DAILY
Qty: 90 TABLET | Refills: 3 | Status: SHIPPED | OUTPATIENT
Start: 2018-10-17 | End: 2019-04-24

## 2018-10-17 ASSESSMENT — PATIENT HEALTH QUESTIONNAIRE - PHQ9: SUM OF ALL RESPONSES TO PHQ QUESTIONS 1-9: 3

## 2018-10-23 ENCOUNTER — TELEPHONE (OUTPATIENT)
Dept: INTERNAL MEDICINE | Facility: CLINIC | Age: 83
End: 2018-10-23

## 2018-10-23 NOTE — TELEPHONE ENCOUNTER
RX states take 1 tablet 2 times daily. Pt requested to be called after 3 pm today, waiting until than to contact her.

## 2018-10-23 NOTE — TELEPHONE ENCOUNTER
Reason for Call:  Other call back    Detailed comments: Pt's script for omeprazole states I per day and pt takes 2 per day so she will need a new script.    Phone Number Patient can be reached at: Home number on file [phone] (home)    Best Time: after 3pm    Can we leave a detailed message on this number? YES    Call taken on 10/23/2018 at 1:08 PM by BERNARDA SALMON

## 2018-11-16 DIAGNOSIS — N18.30 CKD (CHRONIC KIDNEY DISEASE) STAGE 3, GFR 30-59 ML/MIN (H): ICD-10-CM

## 2018-11-16 DIAGNOSIS — E78.5 HYPERLIPIDEMIA LDL GOAL <100: ICD-10-CM

## 2018-11-16 DIAGNOSIS — E11.22 TYPE 2 DIABETES MELLITUS WITH STAGE 3 CHRONIC KIDNEY DISEASE, WITHOUT LONG-TERM CURRENT USE OF INSULIN (H): Primary | ICD-10-CM

## 2018-11-16 DIAGNOSIS — N18.30 TYPE 2 DIABETES MELLITUS WITH STAGE 3 CHRONIC KIDNEY DISEASE, WITHOUT LONG-TERM CURRENT USE OF INSULIN (H): Primary | ICD-10-CM

## 2018-11-16 LAB
ALBUMIN SERPL-MCNC: 3.9 G/DL (ref 3.4–5)
ALP SERPL-CCNC: 76 U/L (ref 40–150)
ALT SERPL W P-5'-P-CCNC: 21 U/L (ref 0–50)
AST SERPL W P-5'-P-CCNC: 17 U/L (ref 0–45)
BILIRUB DIRECT SERPL-MCNC: 0.2 MG/DL (ref 0–0.2)
BILIRUB SERPL-MCNC: 0.7 MG/DL (ref 0.2–1.3)
CHOLEST SERPL-MCNC: 177 MG/DL
HDLC SERPL-MCNC: 78 MG/DL
LDLC SERPL CALC-MCNC: 79 MG/DL
NONHDLC SERPL-MCNC: 99 MG/DL
PROT SERPL-MCNC: 8.2 G/DL (ref 6.8–8.8)
TRIGL SERPL-MCNC: 101 MG/DL

## 2018-11-16 PROCEDURE — 80076 HEPATIC FUNCTION PANEL: CPT | Performed by: INTERNAL MEDICINE

## 2018-11-16 PROCEDURE — 36415 COLL VENOUS BLD VENIPUNCTURE: CPT | Performed by: INTERNAL MEDICINE

## 2018-11-16 PROCEDURE — 80061 LIPID PANEL: CPT | Performed by: INTERNAL MEDICINE

## 2018-11-16 NOTE — LETTER
[facility]  [address] [address]  [phone]      12/24/2018       Janet Cope  [address]   [facility] [address]        Dear Janet,  A   review of your chart   showed that your lab results from  November were previously reviewed by myself  and noted to be normal and not  requiring any acute intervention but were not sent to you with recommendations for follow-up at that time. I apologize for the delay in conveying these results to you.   Here are your most recent lab results. Unless commented on below, mild variation of results  outside the normal range are not clinically signicant.    Resulted Orders   Lipid panel reflex to direct LDL Fasting   Result Value Ref Range    Cholesterol 177 <200 mg/dL    Triglycerides 101 <150 mg/dL    HDL Cholesterol 78 >49 mg/dL    LDL Cholesterol Calculated 79 <100 mg/dL      Comment:      Desirable:       <100 mg/dl    Non HDL Cholesterol 99 <130 mg/dL   Hepatic panel   Result Value Ref Range    Bilirubin Direct 0.2 0.0 - 0.2 mg/dL    Bilirubin Total 0.7 0.2 - 1.3 mg/dL    Albumin 3.9 3.4 - 5.0 g/dL    Protein Total 8.2 6.8 - 8.8 g/dL    Alkaline Phosphatase 76 40 - 150 U/L    ALT 21 0 - 50 U/L    AST 17 0 - 45 U/L       Total Cholesterol, HDL (good) Cholesterol, LDL (bad) Cholesterol, Non-HDL (bad) cholesterol, Triglycerides and Liver lab results were normal.  Continue current medication.  Please contact your pharmacy if you need further refills of your medication.  Call our appointment desk at [phone] to schedule a lab appointment again non-fasting  in  Mid April 2019.    If you have further questions/concerns regarding the results, I would ask that you bring them to your next follow-up appointment with me and I would be happy to review them with you further.        Sincerely,      Frank Kovacs MD  Internal Medicine

## 2019-01-26 ENCOUNTER — OFFICE VISIT (OUTPATIENT)
Dept: URGENT CARE | Facility: URGENT CARE | Age: 84
End: 2019-01-26
Payer: MEDICARE

## 2019-01-26 VITALS
OXYGEN SATURATION: 98 % | SYSTOLIC BLOOD PRESSURE: 170 MMHG | TEMPERATURE: 98.8 F | DIASTOLIC BLOOD PRESSURE: 82 MMHG | HEART RATE: 84 BPM | RESPIRATION RATE: 16 BRPM

## 2019-01-26 DIAGNOSIS — M54.50 ACUTE LEFT-SIDED LOW BACK PAIN WITHOUT SCIATICA: Primary | ICD-10-CM

## 2019-01-26 DIAGNOSIS — I10 ESSENTIAL HYPERTENSION, BENIGN: ICD-10-CM

## 2019-01-26 PROCEDURE — 99213 OFFICE O/P EST LOW 20 MIN: CPT | Performed by: FAMILY MEDICINE

## 2019-01-26 RX ORDER — METHOCARBAMOL 750 MG/1
750 TABLET, FILM COATED ORAL 3 TIMES DAILY
Qty: 21 TABLET | Refills: 0 | Status: SHIPPED | OUTPATIENT
Start: 2019-01-26 | End: 2019-05-23

## 2019-01-26 NOTE — PROGRESS NOTES
SUBJECTIVEHPI: Janet Cope is a 84 year old female who presents for evaluation of back pain.  Symptoms began this am ago, have been and are stable.  Pain is located in the low back left region, with radiation to does not radiate.  Recent injury:none, noted pain while getting out of a tube  Personal hx of back pain is recurrent self limited episodes of low back pain in the past.  Pain is exacerbated by: bending and changing position.  Pain is relieved by: rest.  Associated sx include: none.  Red flag symptoms: negative.    Past Medical History:   Diagnosis Date     Anxiety      CKD (chronic kidney disease) stage 3, GFR 30-59 ml/min (H)      Depression      Diverticulosis of colon (without mention of hemorrhage)     colonoscopy negative other than sigmoid diverticulosis     Enthesopathy of hip region     Bilateral     Essential hypertension, benign      Generalized osteoarthrosis, unspecified site      Macular degeneration      Macular degeneration, dry      Osteoporosis, unspecified      Other specified gastritis without mention of hemorrhage      Personal history of colonic polyps      had hyperplastic polyps      Personal history of malignant neoplasm of breast  &     left breast cancer (), right breast cancer (); patient sees Dr. Martinez regularly     Postmenopausal bleeding     endometrial biopsies negative; s/p D & C     Type 2 diabetes mellitus with diabetic chronic kidney disease  (goal A1C<7) 10/24/2015     Unspecified hemorrhoids without mention of complication 10/05    Internal, s/p banding ligature     Allergies   Allergen Reactions     Aromasin [Exemestane]      Muscle weakness, pain in legs     Lisinopril      Cough and Dizziness.     Social History     Tobacco Use     Smoking status: Former Smoker     Last attempt to quit: 10/10/1981     Years since quittin.3     Smokeless tobacco: Never Used   Substance Use Topics     Alcohol use: Yes     Alcohol/week: 18.0 oz      Comment: 1-2 glass of wine daily       ROS:CONSTITUTIONAL:NEGATIVE for fever, chills, change in weight    OBJECTIVE:  /82 (BP Location: Right arm, Patient Position: Sitting, Cuff Size: Adult Regular)   Pulse 84   Temp 98.8  F (37.1  C) (Tympanic)   Resp 16   SpO2 98%   Back examination: Back symmetric, no curvature. ROM normal. No CVA tenderness., positive findings: limitation of motion - flexion: Moderate and extension: Moderate, paraspinal muscle spasm, tenderness to palpation.  Straight leg raise test: negative.  GENERAL APPEARANCE: healthy, alert and no distress  ABDOMEN:  soft, nontender, no HSM or masses and bowel sounds normal  NEURO: Normal strength and tone with no weakness or sensory deficit noted, reflexes normal   SKIN: no suspicious lesions or rashes      ICD-10-CM    1. Acute left-sided low back pain without sciatica M54.5 methocarbamol (ROBAXIN) 750 MG tablet   2. Essential hypertension, benign I10      Continue prn heat or ice application.  OTC tylenol  F/U PCP/IM/FP if persists, ED if worse

## 2019-01-28 ENCOUNTER — OFFICE VISIT (OUTPATIENT)
Dept: INTERNAL MEDICINE | Facility: CLINIC | Age: 84
End: 2019-01-28
Payer: MEDICARE

## 2019-01-28 VITALS
BODY MASS INDEX: 29.98 KG/M2 | HEART RATE: 80 BPM | HEIGHT: 63 IN | RESPIRATION RATE: 12 BRPM | TEMPERATURE: 98.2 F | DIASTOLIC BLOOD PRESSURE: 76 MMHG | SYSTOLIC BLOOD PRESSURE: 164 MMHG | WEIGHT: 169.2 LBS

## 2019-01-28 DIAGNOSIS — M54.50 ACUTE LEFT-SIDED LOW BACK PAIN WITHOUT SCIATICA: ICD-10-CM

## 2019-01-28 DIAGNOSIS — B02.9 HERPES ZOSTER WITHOUT COMPLICATION: Primary | ICD-10-CM

## 2019-01-28 PROCEDURE — 99214 OFFICE O/P EST MOD 30 MIN: CPT | Performed by: PHYSICIAN ASSISTANT

## 2019-01-28 RX ORDER — VALACYCLOVIR HYDROCHLORIDE 1 G/1
1000 TABLET, FILM COATED ORAL 3 TIMES DAILY
Qty: 21 TABLET | Refills: 0 | Status: SHIPPED | OUTPATIENT
Start: 2019-01-28 | End: 2019-04-23

## 2019-01-28 ASSESSMENT — MIFFLIN-ST. JEOR: SCORE: 1186.62

## 2019-01-28 NOTE — PATIENT INSTRUCTIONS
Light dressing to the open area  Treatment for shingles   Patient Education     Shingles  Shingles is a viral infection caused by the same virus that causes chicken pox. Anyone who has had chicken pox may get shingles later in life. The virus stays in the body, but remains asleep (dormant). Shingles often occurs in older persons or persons with lowered immunity. But it can affect anyone at any age.  Shingles starts as a tingling patch of skin on one side of the body. Small, painful blisters may then appear. The rash rarely spreads to other parts of the body.  Exposure to shingles can't cause shingles. However, it can cause chicken pox in anyone who has not had chicken pox or has not been vaccinated. The contagious period ends when all blisters have crusted over, generally 1 to 2 weeks after the illness starts.  After the blisters heal, the affected skin may be sensitive or painful for weeks or months, gradually resolving over time. But, sometimes this can last longer and be permanent (called postherpetic neuralgia.)  Shingles vaccines are available. Vaccination can help prevent shingles or make it less painful. It is generally recommended for adults older than 50, even if you've had singles in the past. Talk with your healthcare provider about when to get vaccinated and which vaccine is best for you.  Home care    Medicines may be prescribed to help relieve pain. Take these medicines as directed. Ask your healthcare provider or pharmacist before using over-the-counter medicines for helping treat pain and itching.    In certain cases, antiviral medicines may be prescribed to reduce pain, shorten the illness, and prevent neuralgia. Take these medicines as directed.    Compresses made from a solution of cool water mixed with cornstarch or baking soda may help relieve pain and itching.     Gently wash skin daily with soap and water to help prevent infection. Be certain to rinse off all of the soap, which can be  irritating.    Trim fingernails and try not to scratch. Scratching the sores may leave scars.    Stay home from work or school until all blisters have formed a crust and you are no longer contagious.  Follow-up care  Follow up with your healthcare provider, or as directed.  When to seek medical advice    Fever of 100.4 F (38 C) or higher, or as directed by your healthcare provider    Affected skin is on the face or neck and any of the following occur:  ? Headache  ? Eye pain  ? Changes in vision  ? Sores near the eye  ? Weakness of facial muscles    Blisters occurring on new areas of the body    Pain, redness, or swelling of a joint    Signs of skin infection: colored drainage from the sores, warmth, increasing redness, fever, or increasing pain   Date Last Reviewed: 4/1/2018 2000-2018 [employer]. [address] [place] [address]. All rights reserved. This information is not intended as a substitute for professional medical care. Always follow your healthcare professional's instructions.

## 2019-01-28 NOTE — PROGRESS NOTES
"  SUBJECTIVE:   Janet Cope is a 84 year old female who presents to clinic today for the following health issues:      Open sore on left buttock       Duration: first noticed it yesterday    Description (location/character/radiation): noted some draining     looked at it and noted open skin    Not sure if blistered lesion.    Left lower back /buttock pain started last week    Has had new Shingles vaccine.     Intensity:  mild    Accompanying signs and symptoms:     History (similar episodes/previous evaluation): None    Precipitating or alleviating factors: None    Therapies tried and outcome: used Bacitracin Plus on it       -------------------------------------    Problem list and histories reviewed & adjusted, as indicated.  Additional history: as documented    Labs reviewed in EPIC    Reviewed and updated as needed this visit by clinical staff  Allergies  Meds       Reviewed and updated as needed this visit by Provider  Allergies  Meds         ROS:  Constitutional, HEENT, cardiovascular, pulmonary, gi and gu  Derm systems are negative, except as otherwise noted.    OBJECTIVE:     /76   Pulse 80   Temp 98.2  F (36.8  C) (Oral)   Resp 12   Ht 1.6 m (5' 3\")   Wt 76.7 kg (169 lb 3.2 oz)   BMI 29.97 kg/m    Body mass index is 29.97 kg/m .  GENERAL: healthy, alert and no distress  NECK: no adenopathy  RESP: lungs clear to auscultation - no rales, rhonchi or wheezes  CV: regular rate and rhythm, normal S1 S2, no S3 or S4, no murmur, click or rub, no peripheral edema and peripheral pulses strong  ABDOMEN: soft, nontender, no hepatosplenomegaly, no masses and bowel sounds normal  MS: no gross musculoskeletal defects noted, no edema  SKIN: left buttock  With open area about 2-3 cm in size, shallow with redness around the area.  Another red area - herpetic appearance above this.   No other lesions noted  No induration or abscess noted.    Diagnostic Test Results:  none     ASSESSMENT/PLAN: "             1. Herpes zoster without complication    - valACYclovir (VALTREX) 1000 mg tablet; Take 1 tablet (1,000 mg) by mouth 3 times daily for 7 days  Dispense: 21 tablet; Refill: 0    2. Acute left-sided low back pain without sciatica  Likely related to #1      See patient instructions     25 minutes spent with patient > 50% of time on counseling and plan of care.        Rita Zapata PA-C  [facility]

## 2019-04-16 DIAGNOSIS — E11.22 TYPE 2 DIABETES MELLITUS WITH STAGE 3 CHRONIC KIDNEY DISEASE, WITHOUT LONG-TERM CURRENT USE OF INSULIN (H): ICD-10-CM

## 2019-04-16 DIAGNOSIS — N18.30 TYPE 2 DIABETES MELLITUS WITH STAGE 3 CHRONIC KIDNEY DISEASE, WITHOUT LONG-TERM CURRENT USE OF INSULIN (H): ICD-10-CM

## 2019-04-16 DIAGNOSIS — N18.30 CKD (CHRONIC KIDNEY DISEASE) STAGE 3, GFR 30-59 ML/MIN (H): ICD-10-CM

## 2019-04-16 LAB
ANION GAP SERPL CALCULATED.3IONS-SCNC: 6 MMOL/L (ref 3–14)
BUN SERPL-MCNC: 24 MG/DL (ref 7–30)
CALCIUM SERPL-MCNC: 9.8 MG/DL (ref 8.5–10.1)
CHLORIDE SERPL-SCNC: 106 MMOL/L (ref 94–109)
CO2 SERPL-SCNC: 26 MMOL/L (ref 20–32)
CREAT SERPL-MCNC: 1.08 MG/DL (ref 0.52–1.04)
CREAT UR-MCNC: 81 MG/DL
GFR SERPL CREATININE-BSD FRML MDRD: 47 ML/MIN/{1.73_M2}
GLUCOSE SERPL-MCNC: 105 MG/DL (ref 70–99)
HBA1C MFR BLD: 5.5 % (ref 0–5.6)
MICROALBUMIN UR-MCNC: 13 MG/L
MICROALBUMIN/CREAT UR: 15.62 MG/G CR (ref 0–25)
POTASSIUM SERPL-SCNC: 4.6 MMOL/L (ref 3.4–5.3)
SODIUM SERPL-SCNC: 138 MMOL/L (ref 133–144)

## 2019-04-16 PROCEDURE — 36415 COLL VENOUS BLD VENIPUNCTURE: CPT | Performed by: INTERNAL MEDICINE

## 2019-04-16 PROCEDURE — 80048 BASIC METABOLIC PNL TOTAL CA: CPT | Performed by: INTERNAL MEDICINE

## 2019-04-16 PROCEDURE — 82043 UR ALBUMIN QUANTITATIVE: CPT | Performed by: INTERNAL MEDICINE

## 2019-04-16 PROCEDURE — 83036 HEMOGLOBIN GLYCOSYLATED A1C: CPT | Performed by: INTERNAL MEDICINE

## 2019-04-23 ENCOUNTER — OFFICE VISIT (OUTPATIENT)
Dept: INTERNAL MEDICINE | Facility: CLINIC | Age: 84
End: 2019-04-23
Payer: MEDICARE

## 2019-04-23 VITALS
SYSTOLIC BLOOD PRESSURE: 136 MMHG | WEIGHT: 168 LBS | BODY MASS INDEX: 29.76 KG/M2 | DIASTOLIC BLOOD PRESSURE: 70 MMHG | OXYGEN SATURATION: 97 % | RESPIRATION RATE: 16 BRPM | HEART RATE: 92 BPM | TEMPERATURE: 98.5 F

## 2019-04-23 DIAGNOSIS — K21.9 GASTROESOPHAGEAL REFLUX DISEASE WITHOUT ESOPHAGITIS: ICD-10-CM

## 2019-04-23 DIAGNOSIS — N18.30 TYPE 2 DIABETES MELLITUS WITH STAGE 3 CHRONIC KIDNEY DISEASE, WITHOUT LONG-TERM CURRENT USE OF INSULIN (H): Primary | ICD-10-CM

## 2019-04-23 DIAGNOSIS — E11.22 TYPE 2 DIABETES MELLITUS WITH STAGE 3 CHRONIC KIDNEY DISEASE, WITHOUT LONG-TERM CURRENT USE OF INSULIN (H): Primary | ICD-10-CM

## 2019-04-23 DIAGNOSIS — M85.80 OSTEOPENIA, UNSPECIFIED LOCATION: ICD-10-CM

## 2019-04-23 DIAGNOSIS — I10 ESSENTIAL HYPERTENSION, BENIGN: ICD-10-CM

## 2019-04-23 DIAGNOSIS — F32.0 MILD MAJOR DEPRESSION (H): ICD-10-CM

## 2019-04-23 DIAGNOSIS — Z00.00 MEDICARE ANNUAL WELLNESS VISIT, SUBSEQUENT: ICD-10-CM

## 2019-04-23 DIAGNOSIS — E78.5 HYPERLIPIDEMIA LDL GOAL <100: ICD-10-CM

## 2019-04-23 PROCEDURE — G0439 PPPS, SUBSEQ VISIT: HCPCS | Performed by: INTERNAL MEDICINE

## 2019-04-23 PROCEDURE — 99214 OFFICE O/P EST MOD 30 MIN: CPT | Mod: 25 | Performed by: INTERNAL MEDICINE

## 2019-04-23 ASSESSMENT — PATIENT HEALTH QUESTIONNAIRE - PHQ9: SUM OF ALL RESPONSES TO PHQ QUESTIONS 1-9: 4

## 2019-04-23 NOTE — PATIENT INSTRUCTIONS
Continue current meds  Call  [phone]   to schedule a future lab appointment  fasting in  Late October/early November. See me a few days later or earlier as needed  Try to avoid daytime naps and avoid stimulation in bedroom  like radio to help with nighttime sleep  Complete healthcare directive and return to clinic  Repeat DEXA in 1 year

## 2019-04-23 NOTE — PROGRESS NOTES
SUBJECTIVE:   Janet Cope is a 84 year old female who presents to clinic today for the following   health issues:    Chief Complaint   Patient presents with     Diabetes     Hyperlipidemia     Hypertension     Wellness Visit     Diabetes Follow-up      Patient is checking blood sugars: rarely.  Results range in the 120's    Diabetic concerns: None     Symptoms of hypoglycemia (low blood sugar): none     Paresthesias (numbness or burning in feet) or sores: No     Date of last diabetic eye exam: No Recent Eye Exam on File    Diabetes Management Resources    Hyperlipidemia Follow-Up      Rate your low fat/cholesterol diet?: good    Taking statin?  Yes, no muscle aches from statin    Other lipid medications/supplements?:  none    Hypertension Follow-up      Outpatient blood pressures are not being checked.    Low Salt Diet: no added salt    BP Readings from Last 2 Encounters:   01/28/19 164/76   01/26/19 170/82     Hemoglobin A1C (%)   Date Value   04/16/2019 5.5   10/08/2018 6.0 (H)     LDL Cholesterol Calculated (mg/dL)   Date Value   11/16/2018 79   10/08/2018 136 (H)       Amount of exercise or physical activity: 6-7 days/week for an average of 15-30 minutes    Problems taking medications regularly: No    Medication side effects: none    Diet: low salt and low fat/cholesterol    Lab Results   Component Value Date     04/16/2019     Lab Results   Component Value Date    A1C 5.5 04/16/2019     Lab Results   Component Value Date    CHOL 177 11/16/2018     Lab Results   Component Value Date    LDL 79 11/16/2018     Lab Results   Component Value Date    HDL 78 11/16/2018     Lab Results   Component Value Date    TRIG 101 11/16/2018     Lab Results   Component Value Date    CR 1.08 04/16/2019     Lab Results   Component Value Date    ALT 21 11/16/2018     Lab Results   Component Value Date    AST 17 11/16/2018     Lab Results   Component Value Date    MICROL 13 04/16/2019     Lab Results   Component Value  "Date    TSH 1.69 10/08/2018     Denies CP, SOB, abdominal pain, polyuria, polydipsia, vision changes, extremity numbness/parasthesias or skin problems.  Weight down 10 pounds in 3 years with walking dog 3x/day      Annual Wellness Visit    Are you in the first 12 months of your Medicare Part B coverage?  No    Physical Health:    In general, how would you rate your overall physical health? excellent    If you drink alcohol do you typically have >3 drinks per day or >7 drinks per week? No    Do you usually eat at least 4 servings of fruit and vegetables a day, include whole grains & fiber and avoid regularly eating high fat or \"junk\" foods? Yes    Needs assistance for the following daily activities: no assistance needed    Which of the following safety concerns are present in your home?  none identified     Hearing impairment: No    In the past 6 months, have you been bothered by leaking of urine? Yes - minimal leak rarely at night. States wears a pad and not bothersome to her. Denies need for med    Mental Health:    In general, how would you rate your overall mental or emotional health? excellent  PHQ-2 Score:      Do you feel safe in your environment? Yes    Do you have a Health Care Directive? No: Advance care planning reviewed with patient; information given to patient to review.    Fall risk:  Fallen 2 or more times in the past year?: No  Any fall with injury in the past year?: No    Cognitive Screenin) Repeat 3 items (Leader, Season, Table)    2) Clock draw: Abnormal  Clock finger location but numbers OK  3) 3 item recall: Recalls 2 objects   Results: ABNORMAL clock, 1-2 items recalled: PROBABLE COGNITIVE IMPAIRMENT, **INFORM PROVIDER**    Mini-CogTM Copyright S Boni. Licensed by the author for use in [facility]; reprinted with permission (brown@.[facility]). All rights reserved.      Current providers sharing in care for this patient include:   Patient Care Team:  Frank Kovacs MD as PCP - " "Frank Singh MD as Assigned PCP      Do you have sleep apnea, excessive snoring or daytime drowsiness?: no    Additional history: as documented    Reviewed  and updated as needed this visit by clinical staff         Reviewed and updated as needed this visit by Provider      Pt's past medical history, family history, habits, medications and allergies were reviewed with the patient today.  See snap shot for  HCM status. Most recent lab results reviewed with pt. Problem list and histories reviewed & adjusted, as indicated.  Additional history as below:    Has some chronic insomnia. Wakes up during night every 2-3 hrs. Some fatigue in the day and 1 nap/day. Not bothersome enough that wants to take additional medication. No anxiety  1 glass wine at lunch playing cribbage. One coffee in AM.  Pt denies memory issues affecting ADLs. Plays bridge well  Vaccinations UTD. Declines mammogram  Eye exam next month. Appt scheduled through daughter and not sure where that will be. Last seen in [place] 1 year ago. Has dry macular degeneration  Not driving for 9 years.   Has a prescription for buspirone but uses it very rarely.  Denies anxiety currently.  Denies current depression symptoms.  PHQ= 4  Stable GERD symptoms with use of omeprazole.  Has recurrent symptoms when reducing dose to once a day       Additional ROS:   Constitutional, HEENT, Cardiovascular, Pulmonary, GI and , Neuro, MSK and Psych review of systems/symptoms are otherwise negative or unchanged from previous, except as noted above.      OBJECTIVE:  /70   Pulse 92   Temp 98.5  F (36.9  C) (Oral)   Resp 16   Wt 76.2 kg (168 lb)   SpO2 97%   BMI 29.76 kg/m     Estimated body mass index is 29.76 kg/m  as calculated from the following:    Height as of 1/28/19: 1.6 m (5' 3\").    Weight as of this encounter: 76.2 kg (168 lb).  Eye: PERRL, EOMI  HENT: ear canals and TM's normal and nose and mouth without ulcers or lesions   Neck: no adenopathy. " Thyroid normal to palpation. No bruits  Pulm: Lungs clear to auscultation   CV: Regular rates and rhythm  GI: Soft, nontender, Normal active bowel sounds, No hepatosplenomegaly or masses palpable  Ext: Peripheral pulses intact. No edema.  Neuro: Normal strength and tone, sensory exam grossly normal    Assessment/Plan: (See plan discussion below for further details)  1. Type 2 diabetes mellitus with stage 3 chronic kidney disease, without long-term current use of insulin (H)  Controlled.  Continue dietary.  Repeat lab in 6 months especially ordered    2. Medicare annual wellness visit, subsequent  See below for healthcare maintenance discussion.  Otherwise up-to-date.  Patient will complete healthcare directive and return to clinic.  Patient not concerned current memory status and not affecting ADLs.  Denies need for treatment for minimal urinary symptoms    3. Mild major depression (H)  Controlled.  No medication medication this time    4. Hyperlipidemia LDL goal <100  Controlled.  Continue current medication.  Repeat lab in 6 months as per the order control.  Continue to medication  - atorvastatin (LIPITOR) 20 MG tablet; Take 1 tablet (20 mg) by mouth daily  Dispense: 90 tablet; Refill: 3    5. BENIGN HYPERTENSION  Controlled.  Continue current medication.  - amLODIPine (NORVASC) 5 MG tablet; Take 1 tablet (5 mg) by mouth daily  Dispense: 90 tablet; Refill: 3  - losartan (COZAAR) 50 MG tablet; Take 1 tablet (50 mg) by mouth daily  Dispense: 90 tablet; Refill: 3    6. Gastroesophageal reflux disease without esophagitis  Controlled.  Continue current medication.  - omeprazole (PRILOSEC) 20 MG DR capsule; Take 1 capsule (20 mg) by mouth 2 times daily  Dispense: 180 capsule; Refill: 3    7. Osteopenia, unspecified location  Previous treatment with Fosamax.  Currently on a drug holiday.  Repeat DEXA in 1 year    Plan discussion:  Continue current meds  Call  [phone]   to schedule a future lab appointment  fasting  in  Late October/early November. See me a few days later or earlier as needed  Try to avoid daytime naps and avoid stimulation in bedroom  like radio to help with nighttime sleep  Complete healthcare directive and return to clinic  Patient declines breast cancer screening  Repeat DEXA in 1 year       Frank Kovacs MD  Internal Medicine Department  [facility]

## 2019-04-24 PROBLEM — M85.80 OSTEOPENIA, UNSPECIFIED LOCATION: Status: ACTIVE | Noted: 2019-04-24

## 2019-04-24 RX ORDER — LOSARTAN POTASSIUM 50 MG/1
50 TABLET ORAL DAILY
Qty: 90 TABLET | Refills: 3 | Status: SHIPPED | OUTPATIENT
Start: 2019-04-24 | End: 2020-06-29

## 2019-04-24 RX ORDER — ATORVASTATIN CALCIUM 20 MG/1
20 TABLET, FILM COATED ORAL DAILY
Qty: 90 TABLET | Refills: 3 | Status: SHIPPED | OUTPATIENT
Start: 2019-04-24 | End: 2020-06-22

## 2019-04-24 RX ORDER — AMLODIPINE BESYLATE 5 MG/1
5 TABLET ORAL DAILY
Qty: 90 TABLET | Refills: 3 | Status: SHIPPED | OUTPATIENT
Start: 2019-04-24 | End: 2020-07-22

## 2019-05-23 ENCOUNTER — OFFICE VISIT (OUTPATIENT)
Dept: INTERNAL MEDICINE | Facility: CLINIC | Age: 84
End: 2019-05-23
Payer: MEDICARE

## 2019-05-23 VITALS
WEIGHT: 167.3 LBS | BODY MASS INDEX: 29.64 KG/M2 | SYSTOLIC BLOOD PRESSURE: 130 MMHG | TEMPERATURE: 97.9 F | HEART RATE: 84 BPM | DIASTOLIC BLOOD PRESSURE: 76 MMHG | OXYGEN SATURATION: 97 % | RESPIRATION RATE: 15 BRPM | HEIGHT: 63 IN

## 2019-05-23 DIAGNOSIS — R82.90 NONSPECIFIC FINDING ON EXAMINATION OF URINE: ICD-10-CM

## 2019-05-23 DIAGNOSIS — R30.0 DYSURIA: Primary | ICD-10-CM

## 2019-05-23 LAB
ALBUMIN UR-MCNC: ABNORMAL MG/DL
APPEARANCE UR: CLEAR
BACTERIA #/AREA URNS HPF: ABNORMAL /HPF
BILIRUB UR QL STRIP: NEGATIVE
COLOR UR AUTO: YELLOW
GLUCOSE UR STRIP-MCNC: NEGATIVE MG/DL
HGB UR QL STRIP: ABNORMAL
KETONES UR STRIP-MCNC: NEGATIVE MG/DL
LEUKOCYTE ESTERASE UR QL STRIP: ABNORMAL
NITRATE UR QL: NEGATIVE
PH UR STRIP: 5.5 PH (ref 5–7)
RBC #/AREA URNS AUTO: ABNORMAL /HPF
SOURCE: ABNORMAL
SP GR UR STRIP: 1.02 (ref 1–1.03)
TRANS CELLS #/AREA URNS HPF: ABNORMAL /HPF
UROBILINOGEN UR STRIP-ACNC: 0.2 EU/DL (ref 0.2–1)
WBC #/AREA URNS AUTO: ABNORMAL /HPF

## 2019-05-23 PROCEDURE — 87186 SC STD MICRODIL/AGAR DIL: CPT | Performed by: INTERNAL MEDICINE

## 2019-05-23 PROCEDURE — 81001 URINALYSIS AUTO W/SCOPE: CPT | Performed by: INTERNAL MEDICINE

## 2019-05-23 PROCEDURE — 87086 URINE CULTURE/COLONY COUNT: CPT | Performed by: INTERNAL MEDICINE

## 2019-05-23 PROCEDURE — 87088 URINE BACTERIA CULTURE: CPT | Performed by: INTERNAL MEDICINE

## 2019-05-23 PROCEDURE — 99214 OFFICE O/P EST MOD 30 MIN: CPT | Performed by: INTERNAL MEDICINE

## 2019-05-23 RX ORDER — CIPROFLOXACIN 250 MG/1
250 TABLET, FILM COATED ORAL 2 TIMES DAILY
Qty: 14 TABLET | Refills: 0 | Status: SHIPPED | OUTPATIENT
Start: 2019-05-23 | End: 2019-10-15

## 2019-05-23 ASSESSMENT — MIFFLIN-ST. JEOR: SCORE: 1178

## 2019-05-23 NOTE — PROGRESS NOTES
Subjective     Janet Cope is a 84 year old female who presents to clinic today for the following health issues:    HPI     Patient normally seen by Dr. Kovacs.    Patient states that she normally does not get bladder infections but over the last 4 to 5 days she said increasing pressure in her lower pelvic area associated with frequency of urination as well as pain with urination.  There is been no obvious blood in the urine back pain or fevers.  She denies vaginal discharge.    URINARY TRACT SYMPTOMS      Duration: Began Sunday     Description  dysuria, frequency, more frequent dribbling, and nocturia every couple of hours     Intensity:  mild    Accompanying signs and symptoms:  Fever/chills: no   Flank pain no   Nausea and vomiting: no   Vaginal symptoms: none  Abdominal/Pelvic Pain: YES- pain radiates up abdomen when urinating     History  History of frequent UTI's: no   History of kidney stones: no   Sexually Active: no   Possibility of pregnancy: Yes    Precipitating or alleviating factors: None    Therapies tried and outcome: Tylenol   Outcome: helps during the day     Patient Active Problem List   Diagnosis     Personal history of malignant neoplasm of breast     Essential hypertension, benign     Diverticulosis of large intestine     Generalized osteoarthrosis, unspecified site     Esophageal reflux     History of colonic polyps     Macular degeneration     HYPERLIPIDEMIA LDL GOAL <100     Advanced directives, counseling/discussion     Anxiety     CKD (chronic kidney disease) stage 3, GFR 30-59 ml/min (H)     Type 2 diabetes mellitus with stage 3 chronic kidney disease, without long-term current use of insulin (H)     Mild major depression (H)     Osteopenia, unspecified location     Past Surgical History:   Procedure Laterality Date     BREAST LUMPECTOMY, RT/LT  1995    Breast Lumpectomy RT     BREAST LUMPECTOMY, RT/LT  09/2001    left     BREAST RADIATION TX RT/LT  1995    right      BREAST RADIATION  TX RT/LT  2001    left     HYSTERECTOMY, VAGINAL       STRESS ECHO (METRO)  2002    normal       Social History     Tobacco Use     Smoking status: Former Smoker     Last attempt to quit: 10/10/1981     Years since quittin.6     Smokeless tobacco: Never Used   Substance Use Topics     Alcohol use: Yes     Alcohol/week: 18.0 oz     Comment: 1-2 glass of wine daily     Family History   Problem Relation Age of Onset     Diabetes Mother              Heart Disease Mother          Current Outpatient Medications   Medication Sig Dispense Refill     acetaminophen (ARTHRITIS PAIN RELIEVER) 650 MG CR tablet Take 1 tablet (650 mg) by mouth nightly as needed for mild pain or fever       amLODIPine (NORVASC) 5 MG tablet Take 1 tablet (5 mg) by mouth daily 90 tablet 3     ASPIRIN 81 MG OR TABS 1 tab po QD (Once per day)       atorvastatin (LIPITOR) 20 MG tablet Take 1 tablet (20 mg) by mouth daily 90 tablet 3     busPIRone (BUSPAR) 15 MG tablet 1/2 tab daily as needed for anxiety (rare use)       FISH OIL 1000 MG OR CAPS 3 qd       losartan (COZAAR) 50 MG tablet Take 1 tablet (50 mg) by mouth daily 90 tablet 3     NEW MED One Touch Lancet device 1 0     omeprazole (PRILOSEC) 20 MG DR capsule Take 1 capsule (20 mg) by mouth 2 times daily 180 capsule 3     ONETOUCH ULTRA test strip USE TO TEST ONCE DAILY 50 strip 0     order for DME One Touch Ultra test strips. Checking sugars daily 50 strip 11     Allergies   Allergen Reactions     Aromasin [Exemestane]      Muscle weakness, pain in legs     Lisinopril      Cough and Dizziness.     BP Readings from Last 3 Encounters:   19 160/76   19 136/70   19 164/76    Wt Readings from Last 3 Encounters:   19 75.9 kg (167 lb 4.8 oz)   19 76.2 kg (168 lb)   19 76.7 kg (169 lb 3.2 oz)           Reviewed and updated as needed this visit by Provider         Review of Systems   ROS COMP: CONSTITUTIONAL: NEGATIVE for fever, chills, change in  "weight  ENT/MOUTH: NEGATIVE for ear, mouth and throat problems  RESP: NEGATIVE for significant cough or SOB  BREAST: NEGATIVE for masses, tenderness or discharge  CV: NEGATIVE for chest pain, palpitations or peripheral edema  GI: NEGATIVE for nausea, abdominal pain, heartburn, or change in bowel habits  MUSCULOSKELETAL: NEGATIVE for significant arthralgias or myalgia  NEURO: NEGATIVE for weakness, dizziness or paresthesias  HEME: NEGATIVE for bleeding problems  PSYCHIATRIC: NEGATIVE for changes in mood or affect      Objective    /76   Pulse 84   Temp 97.9  F (36.6  C) (Oral)   Resp 15   Ht 1.6 m (5' 3\")   Wt 75.9 kg (167 lb 4.8 oz)   SpO2 97%   BMI 29.64 kg/m    Body mass index is 29.64 kg/m .  Physical Exam   GENERAL: healthy, alert and no distress  EYES: Eyes grossly normal to inspection, PERRL and conjunctivae and sclerae normal  HENT: ear canals and TM's normal, nose and mouth without ulcers or lesions  NECK: no adenopathy, no asymmetry, masses, or scars and thyroid normal to palpation  RESP: lungs clear to auscultation - no rales, rhonchi or wheezes  CV: regular rate and rhythm, normal S1 S2, no S3 or S4, no click or rub, no peripheral edema and peripheral pulses strong  ABDOMEN: soft, nontender, no hepatosplenomegaly, no masses and bowel sounds normal  MS: no gross musculoskeletal defects noted, no edema  PSYCH: mentation appears normal, affect normal/bright        Assessment & Plan     1. Dysuria  Symptoms appear to be clinically that of a urinary tract infection.  I reviewed her recent renal function and will does suggest a empiric trial of Cipro 250 mg twice daily x5 to 7 days pending a urinalysis result.  - *UA reflex to Microscopic and Culture ([place] and [facility] (except [place] and [place])  - ciprofloxacin (CIPRO) 250 MG tablet; Take 1 tablet (250 mg) by mouth 2 times daily  Dispense: 14 tablet; Refill: 0     BMI:   Estimated body mass index is 29.64 kg/m  as calculated from " "the following:    Height as of this encounter: 1.6 m (5' 3\").    Weight as of this encounter: 75.9 kg (167 lb 4.8 oz).     Regular exercise    Return in about 2 weeks (around 6/6/2019) for if symptoms recur or worsen.    Monroe Mcpherson MD  [facility]      "

## 2019-05-25 LAB
BACTERIA SPEC CULT: ABNORMAL
SPECIMEN SOURCE: ABNORMAL

## 2019-10-08 DIAGNOSIS — E78.5 HYPERLIPIDEMIA LDL GOAL <100: ICD-10-CM

## 2019-10-08 DIAGNOSIS — N18.30 CKD (CHRONIC KIDNEY DISEASE) STAGE 3, GFR 30-59 ML/MIN (H): ICD-10-CM

## 2019-10-08 DIAGNOSIS — N18.30 TYPE 2 DIABETES MELLITUS WITH STAGE 3 CHRONIC KIDNEY DISEASE, WITHOUT LONG-TERM CURRENT USE OF INSULIN (H): ICD-10-CM

## 2019-10-08 DIAGNOSIS — E11.22 TYPE 2 DIABETES MELLITUS WITH STAGE 3 CHRONIC KIDNEY DISEASE, WITHOUT LONG-TERM CURRENT USE OF INSULIN (H): ICD-10-CM

## 2019-10-08 LAB
ALBUMIN SERPL-MCNC: 3.8 G/DL (ref 3.4–5)
ALP SERPL-CCNC: 74 U/L (ref 40–150)
ALT SERPL W P-5'-P-CCNC: 23 U/L (ref 0–50)
ANION GAP SERPL CALCULATED.3IONS-SCNC: 10 MMOL/L (ref 3–14)
AST SERPL W P-5'-P-CCNC: 18 U/L (ref 0–45)
BILIRUB SERPL-MCNC: 0.6 MG/DL (ref 0.2–1.3)
BUN SERPL-MCNC: 25 MG/DL (ref 7–30)
CALCIUM SERPL-MCNC: 9 MG/DL (ref 8.5–10.1)
CHLORIDE SERPL-SCNC: 107 MMOL/L (ref 94–109)
CHOLEST SERPL-MCNC: 179 MG/DL
CO2 SERPL-SCNC: 23 MMOL/L (ref 20–32)
CREAT SERPL-MCNC: 0.95 MG/DL (ref 0.52–1.04)
CREAT UR-MCNC: 50 MG/DL
DEPRECATED CALCIDIOL+CALCIFEROL SERPL-MC: 21 UG/L (ref 20–75)
GFR SERPL CREATININE-BSD FRML MDRD: 54 ML/MIN/{1.73_M2}
GLUCOSE SERPL-MCNC: 100 MG/DL (ref 70–99)
HDLC SERPL-MCNC: 69 MG/DL
LDLC SERPL CALC-MCNC: 88 MG/DL
MICROALBUMIN UR-MCNC: 31 MG/L
MICROALBUMIN/CREAT UR: 61.88 MG/G CR (ref 0–25)
NONHDLC SERPL-MCNC: 110 MG/DL
POTASSIUM SERPL-SCNC: 4 MMOL/L (ref 3.4–5.3)
PROT SERPL-MCNC: 7.8 G/DL (ref 6.8–8.8)
PTH-INTACT SERPL-MCNC: 44 PG/ML (ref 18–80)
SODIUM SERPL-SCNC: 140 MMOL/L (ref 133–144)
TRIGL SERPL-MCNC: 108 MG/DL
TSH SERPL DL<=0.005 MIU/L-ACNC: 1.55 MU/L (ref 0.4–4)

## 2019-10-08 PROCEDURE — 80053 COMPREHEN METABOLIC PANEL: CPT | Performed by: INTERNAL MEDICINE

## 2019-10-08 PROCEDURE — 84443 ASSAY THYROID STIM HORMONE: CPT | Performed by: INTERNAL MEDICINE

## 2019-10-08 PROCEDURE — 83970 ASSAY OF PARATHORMONE: CPT | Performed by: INTERNAL MEDICINE

## 2019-10-08 PROCEDURE — 36415 COLL VENOUS BLD VENIPUNCTURE: CPT | Performed by: INTERNAL MEDICINE

## 2019-10-08 PROCEDURE — 80061 LIPID PANEL: CPT | Performed by: INTERNAL MEDICINE

## 2019-10-08 PROCEDURE — 82306 VITAMIN D 25 HYDROXY: CPT | Performed by: INTERNAL MEDICINE

## 2019-10-08 PROCEDURE — 82043 UR ALBUMIN QUANTITATIVE: CPT | Performed by: INTERNAL MEDICINE

## 2019-10-15 ENCOUNTER — OFFICE VISIT (OUTPATIENT)
Dept: INTERNAL MEDICINE | Facility: CLINIC | Age: 84
End: 2019-10-15
Payer: MEDICARE

## 2019-10-15 VITALS
WEIGHT: 167 LBS | RESPIRATION RATE: 16 BRPM | OXYGEN SATURATION: 98 % | DIASTOLIC BLOOD PRESSURE: 76 MMHG | TEMPERATURE: 98.2 F | SYSTOLIC BLOOD PRESSURE: 128 MMHG | BODY MASS INDEX: 29.58 KG/M2 | HEART RATE: 75 BPM

## 2019-10-15 DIAGNOSIS — R80.9 PROTEINURIA, UNSPECIFIED TYPE: ICD-10-CM

## 2019-10-15 DIAGNOSIS — E11.22 TYPE 2 DIABETES MELLITUS WITH STAGE 3 CHRONIC KIDNEY DISEASE, WITHOUT LONG-TERM CURRENT USE OF INSULIN (H): ICD-10-CM

## 2019-10-15 DIAGNOSIS — L98.9 SKIN DISORDER: ICD-10-CM

## 2019-10-15 DIAGNOSIS — E78.5 HYPERLIPIDEMIA LDL GOAL <100: ICD-10-CM

## 2019-10-15 DIAGNOSIS — N18.30 CKD (CHRONIC KIDNEY DISEASE) STAGE 3, GFR 30-59 ML/MIN (H): ICD-10-CM

## 2019-10-15 DIAGNOSIS — M19.90 ARTHRITIS: ICD-10-CM

## 2019-10-15 DIAGNOSIS — N18.30 TYPE 2 DIABETES MELLITUS WITH STAGE 3 CHRONIC KIDNEY DISEASE, WITHOUT LONG-TERM CURRENT USE OF INSULIN (H): ICD-10-CM

## 2019-10-15 PROCEDURE — 99214 OFFICE O/P EST MOD 30 MIN: CPT | Performed by: INTERNAL MEDICINE

## 2019-10-15 ASSESSMENT — PATIENT HEALTH QUESTIONNAIRE - PHQ9: SUM OF ALL RESPONSES TO PHQ QUESTIONS 1-9: 3

## 2019-10-15 NOTE — PROGRESS NOTES
Subjective     Janet Cope is a 85 year old female who presents to clinic today for the following health issues:    HPI   Diabetes Follow-up      How often are you checking your blood sugar? A few times a month    What time of day are you checking your blood sugars (select all that apply)?  Before meals    Have you had any blood sugars above 200?  No    Have you had any blood sugars below 70?  No    What symptoms do you notice when your blood sugar is low?  None    What concerns do you have today about your diabetes? None     Do you have any of these symptoms? (Select all that apply)  No numbness or tingling in feet.  No redness, sores or blisters on feet.  No complaints of excessive thirst.  No reports of blurry vision.  No significant changes to weight.     Have you had a diabetic eye exam in the last 12 months? No    Diabetes Management Resources    Hyperlipidemia Follow-Up      Are you having any of the following symptoms? (Select all that apply)  No complaints of shortness of breath, chest pain or pressure.  No increased sweating or nausea with activity.  No left-sided neck or arm pain.  No complaints of pain in calves when walking 1-2 blocks.    Are you regularly taking any medication or supplement to lower your cholesterol?   Yes- Atorvastatin    Are you having muscle aches or other side effects that you think could be caused by your cholesterol lowering medication?  Yes- At times in the middle of the night, May not be related to medication    Hypertension Follow-up      Do you check your blood pressure regularly outside of the clinic? No     Are you following a low salt diet? Yes    Are your blood pressures ever more than 140 on the top number (systolic) OR more   than 90 on the bottom number (diastolic), for example 140/90? No-WNL in clinic    BP Readings from Last 2 Encounters:   05/23/19 130/76   04/23/19 136/70     Hemoglobin A1C (%)   Date Value   04/16/2019 5.5   10/08/2018 6.0 (H)     LDL  Cholesterol Calculated (mg/dL)   Date Value   10/08/2019 88   11/16/2018 79         How many servings of fruits and vegetables do you eat daily?  2-3    On average, how many sweetened beverages do you drink each day (soda, juice, sweet tea, etc)?   1    How many days per week do you miss taking your medication? 0    Lab Results   Component Value Date     10/08/2019     Lab Results   Component Value Date    A1C 5.5 04/16/2019     Lab Results   Component Value Date    CHOL 179 10/08/2019     Lab Results   Component Value Date    LDL 88 10/08/2019     Lab Results   Component Value Date    HDL 69 10/08/2019     Lab Results   Component Value Date    TRIG 108 10/08/2019     Lab Results   Component Value Date    CR 0.95 10/08/2019     Lab Results   Component Value Date    ALT 23 10/08/2019     Lab Results   Component Value Date    AST 18 10/08/2019     Lab Results   Component Value Date    MICROL 31 10/08/2019     Lab Results   Component Value Date    TSH 1.55 10/08/2019     Component      Latest Ref Rng & Units 5/19/2015 6/8/2016 5/8/2017 5/30/2017   Hemoglobin A1C      0 - 5.6 % 6.2 (H) 6.3 (H) 6.2 (H) 5.9     Component      Latest Ref Rng & Units 4/17/2018 10/8/2018 4/16/2019   Hemoglobin A1C      0 - 5.6 % 6.0 (H) 6.0 (H) 5.5       Denies CP, SOB, abdominal pain, polyuria, polydipsia,  extremity numbness/parasthesias or skin problems.  Being followed by ortho for osteoarthritis right hip and right knee. Note 1 month ago reviewed.  Had HA injections in May and told that could get them or steroid injections again  November  2 cups coffee in AM. Nocturia every 1-2 hours. Also some frequency during the day. No nocturia  Has skin growth left nose that bleeds and not healing        Pt's past medical history, family history, habits, medications and allergies were reviewed with the patient today.  See snap shot for  HCM status. Most recent lab results reviewed with pt. Problem list and histories reviewed & adjusted, as  "indicated.  Additional history as below:     Additional ROS:   Constitutional, HEENT, Cardiovascular, Pulmonary, GI and , Neuro, MSK and Psych review of systems/symptoms are otherwise negative or unchanged from previous, except as noted above.      OBJECTIVE:  /76   Pulse 75   Temp 98.2  F (36.8  C) (Temporal)   Resp 16   Wt 75.8 kg (167 lb)   SpO2 98%   BMI 29.58 kg/m     Estimated body mass index is 29.58 kg/m  as calculated from the following:    Height as of 5/23/19: 1.6 m (5' 3\").    Weight as of this encounter: 75.8 kg (167 lb).     Neck: no adenopathy. Thyroid normal to palpation. No bruits  Pulm: Lungs clear to auscultation   CV: Regular rates and rhythm. 1/6 LUIS ENRIQUE apex  GI: Soft, nontender, Normal active bowel sounds, No hepatosplenomegaly or masses palpable  Ext: Peripheral pulses intact. No edema.  Neuro: Normal strength and tone, sensory exam grossly normal  MSK: Mild tenderness to FROM right knee and hip  Skin: papillary lesion left distal nasal aprox 8mm diameter with  Small scab on top    Assessment/Plan: (See plan discussion below for further details)  1. Type 2 diabetes mellitus with stage 3 chronic kidney disease, without long-term current use of insulin (H)   Well controlled. Not checking sugars. Repeat labs 3 mos  - Hemoglobin A1c; Future  - Basic metabolic panel; Future    2. CKD (chronic kidney disease) stage 3, GFR 30-59 ml/min (H)  Improved. Recheck 3 mos  - Basic metabolic panel; Future    3. Hyperlipidemia LDL goal <100  Controlled with statin. Repeat labs 1 year  - Comprehensive metabolic panel; Future  - Lipid panel reflex to direct LDL Fasting; Future    4. Proteinuria, unspecified type  Mild worsening though  GFR better. On Losartan. Will recheck 3 mos. If increased further, will raise Losartan to 100mg daily dose  - Albumin Random Urine Quantitative with Creat Ratio; Future    5. Skin disorder  ? BCC. Pt to see Derm for biopsy  - DERMATOLOGY REFERRAL    6. " Arthritis  Followed by Ortho. Will see them later this year for repeat HA vs cortisone injections for knee, hip    Plan discussion:   Continue current meds  Call  [phone]   to schedule a future lab appointment  non-fasting in mid January 2020 (blood and urine test)  Possible appt with [facility] in [place] for macular degeneration. Call [phone]  Appt with [facility] - [place] [phone]. [facility] 3rd floor re: left nose lesion         Frank Kovacs MD  Internal Medicine Department  [facility]    (Chart documentation was completed, in part, with [employer] voice-recognition software. Even though reviewed, some grammatical, spelling, and word errors may remain.)

## 2019-10-15 NOTE — PATIENT INSTRUCTIONS
Continue current meds  Call  [phone]   to schedule a future lab appointment  non-fasting in mid January 2020 (blood and urine test)  Possible appt with [facility] in [place] for macular degeneration. Call [phone]  Appt with [facility] - [place] [phone]. [facility] 3rd floor re: left nose lesion

## 2019-11-26 ENCOUNTER — OFFICE VISIT (OUTPATIENT)
Dept: DERMATOLOGY | Facility: CLINIC | Age: 84
End: 2019-11-26
Payer: MEDICARE

## 2019-11-26 VITALS — SYSTOLIC BLOOD PRESSURE: 191 MMHG | DIASTOLIC BLOOD PRESSURE: 97 MMHG | HEART RATE: 94 BPM | OXYGEN SATURATION: 97 %

## 2019-11-26 DIAGNOSIS — D48.5 NEOPLASM OF UNCERTAIN BEHAVIOR OF SKIN: Primary | ICD-10-CM

## 2019-11-26 PROCEDURE — 88305 TISSUE EXAM BY PATHOLOGIST: CPT | Mod: TC | Performed by: PHYSICIAN ASSISTANT

## 2019-11-26 PROCEDURE — 11102 TANGNTL BX SKIN SINGLE LES: CPT | Performed by: PHYSICIAN ASSISTANT

## 2019-11-26 PROCEDURE — 99214 OFFICE O/P EST MOD 30 MIN: CPT | Mod: 25 | Performed by: PHYSICIAN ASSISTANT

## 2019-11-26 NOTE — LETTER
11/26/2019         RE: Janet Cope  [address]  [facility] [address]        Dear Colleague,    Thank you for referring your patient, Janet Cope, to the [facility]. Please see a copy of my visit note below.    HPI:   Chief complaints: Janet Cope is a 85 year old female who presents for evaluation of spot on nose. Sometimes will bleed.  Condition present for:  Several months; seems to be growing over the past 3-4 months  Previous treatments include: none  No history of skin CA  Shx: 4 daughters who all live in the area     Review Of Systems  Eyes: negative  Ears/Nose/Throat: negative  Respiratory: No shortness of breath, dyspnea on exertion, cough, or hemoptysis  Cardiovascular: negative  Gastrointestinal: negative  Genitourinary: negative  Musculoskeletal: negative  Neurologic: negative  Psychiatric: negative  Skin: positive for lesion    This document serves as a record of the services and decisions personally performed and made by Kasey Tafoya, MS, PA-C. It was created on her behalf by Carey Jimenez, a trained medical scribe. The creation of this document is based on the provider's statements to the medical scribe.  Carey Jimenez 11:42 AM November 26, 2019    PHYSICAL EXAM:    BP (!) 191/97   Pulse 94   SpO2 97%   Breastfeeding No   Skin exam performed as follows: Type 2 skin. Mood appropriate  Alert and Oriented X 3. Well developed, well nourished in no distress.  General appearance: Normal  Head including face: Normal  Eyes: conjunctiva and lids: Normal  Mouth: Lips, teeth, gums: Normal  Neck: Normal  Chest-breast/axillae: Normal  Back: Normal  Spleen and liver: Normal  Cardiovascular: Exam of peripheral vascular system by observation for swelling, varicosities, edema: Normal  Genitalia: groin, buttocks: Normal  Extremities: digits/nails (clubbing): Normal  Eccrine and Apocrine glands: Normal  Right upper  extremity: Normal  Left upper extremity: Normal  Right lower extremity: Normal  Left lower extremity: Normal  Skin: Scalp and body hair: See below    1. 12 mm pink shiny plaque on right ala    ASSESSMENT/PLAN:     1. R/o BCC on right ala. Photo taken and placed in chart. Shave biopsy performed.  Area cleaned and anesthetized with 1% lidocaine with epinephrine.  Dermablade used to remove the lesion and sent to pathology. Bleeding was cauterized. Pt tolerated procedure well with no complications.    2. Janet to follow up with Primary Care provider regarding elevated blood pressure.        Follow-up: pending path  CC:   Scribed By: Carey Jimenez, Medical Scribe    The information in this document, created by the medical scribe for me, accurately reflects the services I personally performed and the decisions made by me. I have reviewed and approved this document for accuracy prior to leaving the patient care area.  November 26, 2019 11:45 AM    Kasey Tafoya MS, JANESSA        Again, thank you for allowing me to participate in the care of your patient.        Sincerely,        Kasey Tafoya PA-C

## 2019-11-26 NOTE — PROGRESS NOTES
HPI:   Chief complaints: Janet Cope is a 85 year old female who presents for evaluation of spot on nose. Sometimes will bleed.  Condition present for:  Several months; seems to be growing over the past 3-4 months  Previous treatments include: none  No history of skin CA  Shx: 4 daughters who all live in the area     Review Of Systems  Eyes: negative  Ears/Nose/Throat: negative  Respiratory: No shortness of breath, dyspnea on exertion, cough, or hemoptysis  Cardiovascular: negative  Gastrointestinal: negative  Genitourinary: negative  Musculoskeletal: negative  Neurologic: negative  Psychiatric: negative  Skin: positive for lesion    This document serves as a record of the services and decisions personally performed and made by Kasey Tafoya, MS, PA-C. It was created on her behalf by Carey Jimenez, a trained medical scribe. The creation of this document is based on the provider's statements to the medical scribe.  Carey Jimenez 11:42 AM November 26, 2019    PHYSICAL EXAM:    BP (!) 191/97   Pulse 94   SpO2 97%   Breastfeeding No   Skin exam performed as follows: Type 2 skin. Mood appropriate  Alert and Oriented X 3. Well developed, well nourished in no distress.  General appearance: Normal  Head including face: Normal  Eyes: conjunctiva and lids: Normal  Mouth: Lips, teeth, gums: Normal  Neck: Normal  Chest-breast/axillae: Normal  Back: Normal  Spleen and liver: Normal  Cardiovascular: Exam of peripheral vascular system by observation for swelling, varicosities, edema: Normal  Genitalia: groin, buttocks: Normal  Extremities: digits/nails (clubbing): Normal  Eccrine and Apocrine glands: Normal  Right upper extremity: Normal  Left upper extremity: Normal  Right lower extremity: Normal  Left lower extremity: Normal  Skin: Scalp and body hair: See below    1. 12 mm pink shiny plaque on right ala    ASSESSMENT/PLAN:     1. R/o BCC on left ala. Photo taken and placed in chart. Shave  biopsy performed.  Area cleaned and anesthetized with 1% lidocaine with epinephrine.  Dermablade used to remove the lesion and sent to pathology. Bleeding was cauterized. Pt tolerated procedure well with no complications.    2. Janet to follow up with Primary Care provider regarding elevated blood pressure.        Follow-up: pending path  CC:   Scribed By: Carey Jimenez, Medical Scribe    The information in this document, created by the medical scribe for me, accurately reflects the services I personally performed and the decisions made by me. I have reviewed and approved this document for accuracy prior to leaving the patient care area.  November 26, 2019 11:45 AM    Kasey Tafoya MS, PA-C

## 2019-11-29 ENCOUNTER — TELEPHONE (OUTPATIENT)
Dept: DERMATOLOGY | Facility: CLINIC | Age: 84
End: 2019-11-29

## 2019-11-29 LAB — COPATH REPORT: NORMAL

## 2019-11-29 NOTE — LETTER
[facility]  [address]  [address]  [phone]    12/2/2019       Janet Cope  [address]   [facility] [address]      Dear Janet:    You are scheduled for Mohs Surgery on: 1/9/2020 @7:45am.    Please check in at [facility], [address].     You don't need to arrive more than 5-10 minutes prior to your appointment time.     Be sure to eat a good breakfast and bathe and wash your hair prior to surgery.     If you are taking any anti-coagulants that are prescribed by your Doctor (such as Coumadin/Warfarin, Plavix, Aspirin, Ibuprofen), please continue taking them.     However, if you are taking anti-coagulants over the counter without a Doctor's order for a medical condition, please discontinue them 10 days prior to surgery.     Please wear loose comfortable clothing as it could possibly be 4-6 hours until your surgery is completed depending upon how many layers of tissue need to be removed.      Thank you,    SIVA Wilson MD

## 2019-11-29 NOTE — TELEPHONE ENCOUNTER
----- Message from Kasey Tafoya PA-C sent at 11/29/2019 12:57 PM CST -----  Left ala BCC please schedule for mohs

## 2019-12-03 NOTE — TELEPHONE ENCOUNTER
Called and spoke to patient.    Educated patient on biopsy results- BCC. Educated patient on BCC, mohs, scheduled mohs, and letter/packet sent.     Patient voiced understanding.    Carleen RN-BSN-PHN  [facility]  [phone]

## 2019-12-23 DIAGNOSIS — E11.22 TYPE 2 DIABETES MELLITUS WITH DIABETIC CHRONIC KIDNEY DISEASE (H): ICD-10-CM

## 2019-12-23 NOTE — TELEPHONE ENCOUNTER
"Requested Prescriptions   Pending Prescriptions Disp Refills     blood glucose (ONETOUCH ULTRA) test strip 50 strip 0     Sig: USE TO TEST ONCE DAILY       Diabetic Supplies Protocol Passed - 12/23/2019  3:55 PM        Passed - Medication is active on med list        Passed - Patient is 18 years of age or older        Passed - Recent (6 mo) or future (30 days) visit within the authorizing provider's specialty     Patient had office visit in the last 6 months or has a visit in the next 30 days with authorizing provider.  See \"Patient Info\" tab in inbasket, or \"Choose Columns\" in Meds & Orders section of the refill encounter.              Prescription approved per [facility] Refill Protocol.    Kaur LYNCHN, RN, PHN      "

## 2019-12-23 NOTE — TELEPHONE ENCOUNTER
Reason for call:  Medication   If this is a refill request, has the caller requested the refill from the pharmacy already? Yes  Will the patient be using a [facility]? No  Name of the pharmacy and phone number for the current request: [facility] [address]     Name of the medication requested: one test ultra test strips only     Other request: please call pt back when complete     Phone number to reach patient:  Home number on file [phone] (home)    Best Time:  any    Can we leave a detailed message on this number?  YES

## 2020-01-09 ENCOUNTER — TELEPHONE (OUTPATIENT)
Dept: DERMATOLOGY | Facility: CLINIC | Age: 85
End: 2020-01-09

## 2020-01-09 ENCOUNTER — OFFICE VISIT (OUTPATIENT)
Dept: DERMATOLOGY | Facility: CLINIC | Age: 85
End: 2020-01-09
Payer: MEDICARE

## 2020-01-09 VITALS — DIASTOLIC BLOOD PRESSURE: 72 MMHG | SYSTOLIC BLOOD PRESSURE: 152 MMHG | OXYGEN SATURATION: 99 % | HEART RATE: 94 BPM

## 2020-01-09 DIAGNOSIS — C44.01 BASAL CELL CARCINOMA (BCC) OF SKIN OF LIP: Primary | ICD-10-CM

## 2020-01-09 DIAGNOSIS — C44.311 BASAL CELL CARCINOMA (BCC) OF LEFT ALA NASI: ICD-10-CM

## 2020-01-09 PROCEDURE — 17311 MOHS 1 STAGE H/N/HF/G: CPT | Performed by: DERMATOLOGY

## 2020-01-09 PROCEDURE — 14061 TIS TRNFR E/N/E/L10.1-30SQCM: CPT | Performed by: DERMATOLOGY

## 2020-01-09 PROCEDURE — 99213 OFFICE O/P EST LOW 20 MIN: CPT | Mod: 25 | Performed by: DERMATOLOGY

## 2020-01-09 PROCEDURE — 17312 MOHS ADDL STAGE: CPT | Performed by: DERMATOLOGY

## 2020-01-09 PROCEDURE — 11102 TANGNTL BX SKIN SINGLE LES: CPT | Mod: 59 | Performed by: DERMATOLOGY

## 2020-01-09 PROCEDURE — 88331 PATH CONSLTJ SURG 1 BLK 1SPC: CPT | Mod: 59 | Performed by: DERMATOLOGY

## 2020-01-09 PROCEDURE — 14041 TIS TRNFR F/C/C/M/N/A/G/H/F: CPT | Performed by: DERMATOLOGY

## 2020-01-09 NOTE — LETTER
[facility]  [address] [address]  Phone: [phone]  Fax: [phone]    January 16, 2020      Janet Cope                                                                                                           [address]   [facility] [address]            Dear Mrs. Cope,     The tissue sample that was removed from your lip is a basal cell skin cancer, one of the most common types of skin cancer. This type of skin cancer rarely spreads to other parts of the body. However, it should be treated, because this cancer can invade its surrounding tissue over time. I have included an educational pamphlet for your review.     Recommendations :  MOHS Surgery with Dr. Lucas Wilson, Dermatologist to remove skin cancers.  You have determined that the procedure would be too much for you at this time per our phone conversation and office visit.     Thank you for allowing me to be involved in your health care and for choosing [facility].  If you have any questions or concerns please feel free to contact me at [phone].     Sincerely,     Lucas Wilson MD, PhD

## 2020-01-09 NOTE — LETTER
[facility]  [address] [address]  Phone: [phone]  Fax: [phone]    January 16, 2020      Janet Cope                                                                                                           [address]   [facility] [address]        Dear Mrs. Cope,     The tissue sample that was removed from your lip is a basal cell skin cancer, one of the most common types of skin cancer. This type of skin cancer rarely spreads to other parts of the body. However, it should be treated, because this cancer can invade its surrounding tissue over time. I have included an educational pamphlet for your review.     Recommendations :  MOHS Surgery with Dr. Lucas Wilson, Dermatologist to remove skin cancers.  You have determined that the procedure would be too much for you at this time per our phone conversation and office visit.     Thank you for allowing me to be involved in your health care and for choosing [facility].  If you have any questions or concerns please feel free to contact me at [phone].     Sincerely,     Dr. Lucas Wilson

## 2020-01-09 NOTE — PROGRESS NOTES
Janet Cope is a 85 year old year old female patient here today for evaluation and managment of basal cell carcinoma on left ala.  She ntoes spot on lip today.   .  Patient states this has been present for ?.  Patient reports the following symptoms:  new.  Patient reports the following previous treatments none.  These treatments did not work.  Patient reports the following modifying factors none.  Associated symptoms: none.  Patient has no other skin complaints today.  Remainder of the HPI, Meds, PMH, Allergies, FH, and SH was reviewed in chart.      Past Medical History:   Diagnosis Date     Anxiety      Basal cell carcinoma      CKD (chronic kidney disease) stage 3, GFR 30-59 ml/min (H)      Depression      Diverticulosis of colon (without mention of hemorrhage) 8/02    colonoscopy negative other than sigmoid diverticulosis     Enthesopathy of hip region     Bilateral     Essential hypertension, benign      Generalized osteoarthrosis, unspecified site      Macular degeneration      Macular degeneration, dry      Osteoporosis, unspecified      Other specified gastritis without mention of hemorrhage      Personal history of colonic polyps 7/03     had hyperplastic polyps 2013     Personal history of malignant neoplasm of breast 1995 & 9/01    left breast cancer (1995), right breast cancer (9/01); patient sees Dr. Martinez regularly     Postmenopausal bleeding 5/02    endometrial biopsies negative; s/p D & C     Type 2 diabetes mellitus with diabetic chronic kidney disease  (goal A1C<7) 10/24/2015     Unspecified hemorrhoids without mention of complication 10/05    Internal, s/p banding ligature       Past Surgical History:   Procedure Laterality Date     BREAST LUMPECTOMY, RT/LT  1995    Breast Lumpectomy RT     BREAST LUMPECTOMY, RT/LT  09/2001    left     BREAST RADIATION TX RT/LT  1995    right      BREAST RADIATION TX RT/LT  09/2001    left     HYSTERECTOMY, VAGINAL       STRESS ECHO ([facility])  04/2002     normal        Family History   Problem Relation Age of Onset     Diabetes Mother              Heart Disease Mother        Social History     Socioeconomic History     Marital status:      Spouse name: Not on file     Number of children: Not on file     Years of education: Not on file     Highest education level: Not on file   Occupational History     Not on file   Social Needs     Financial resource strain: Not on file     Food insecurity:     Worry: Not on file     Inability: Not on file     Transportation needs:     Medical: Not on file     Non-medical: Not on file   Tobacco Use     Smoking status: Former Smoker     Last attempt to quit: 10/10/1981     Years since quittin.2     Smokeless tobacco: Never Used   Substance and Sexual Activity     Alcohol use: Yes     Alcohol/week: 30.0 standard drinks     Comment: 1-2 glass of wine daily     Drug use: No     Sexual activity: Never     Partners: Male   Lifestyle     Physical activity:     Days per week: Not on file     Minutes per session: Not on file     Stress: Not on file   Relationships     Social connections:     Talks on phone: Not on file     Gets together: Not on file     Attends Che[employer] service: Not on file     Active member of club or organization: Not on file     Attends meetings of clubs or organizations: Not on file     Relationship status: Not on file     Intimate partner violence:     Fear of current or ex partner: Not on file     Emotionally abused: Not on file     Physically abused: Not on file     Forced sexual activity: Not on file   Other Topics Concern     Parent/sibling w/ CABG, MI or angioplasty before 65F 55M? Not Asked   Social History Narrative     Not on file       Outpatient Encounter Medications as of 2020   Medication Sig Dispense Refill     acetaminophen (ARTHRITIS PAIN RELIEVER) 650 MG CR tablet Take 1 tablet (650 mg) by mouth nightly as needed for mild pain or fever       amLODIPine (NORVASC) 5 MG tablet Take 1  tablet (5 mg) by mouth daily 90 tablet 3     ASPIRIN 81 MG OR TABS 1 tab po QD (Once per day)       atorvastatin (LIPITOR) 20 MG tablet Take 1 tablet (20 mg) by mouth daily 90 tablet 3     blood glucose (ONETOUCH ULTRA) test strip USE TO TEST ONCE DAILY 50 strip 11     busPIRone (BUSPAR) 15 MG tablet 1/2 tab daily as needed for anxiety (rare use)       FISH OIL 1000 MG OR CAPS 3 qd       losartan (COZAAR) 50 MG tablet Take 1 tablet (50 mg) by mouth daily 90 tablet 3     NEW MED One Touch Lancet device 1 0     omeprazole (PRILOSEC) 20 MG DR capsule Take 1 capsule (20 mg) by mouth 2 times daily 180 capsule 3     order for DME One Touch Ultra test strips. Checking sugars daily 50 strip 11     No facility-administered encounter medications on file as of 1/9/2020.              Review Of Systems  Skin: As above  Eyes: negative  Ears/Nose/Throat: negative  Respiratory: No shortness of breath, dyspnea on exertion, cough, or hemoptysis  Cardiovascular: negative  Gastrointestinal: negative  Genitourinary: negative  Musculoskeletal: negative  Neurologic: negative  Psychiatric: negative  Hematologic/Lymphatic/Immunologic: negative  Endocrine: negative      O:   NAD, WDWN, Alert & Oriented, Mood & Affect wnl, Vitals stable   Here today alone   BP (!) 152/72   Pulse 94   SpO2 99%    General appearance normal   Vitals stable   Alert, oriented and in no acute distress      Following lymph nodes palpated: Occipital, Cervical, Supraclavicular no lad   L ala 1.2cm pink pearly papule   L cut upper lip 5mm pink pearly papule      The remainder of expanded problem focused exam was unremarkable; the following areas were examined:  scalp/hair, conjunctiva/lids, face, neck, lips, chest, digits/nails, RUE, LUE.      Eyes: Conjunctivae/lids:Normal     ENT: Lips, buccal mucosa, tongue: normal    MSK:Normal    Cardiovascular: peripheral edema none    Pulm: Breathing Normal    Lymph Nodes: No Head and Neck Lymphadenopathy     Neuro/Psych:  Orientation:Alert and Orientedx3 ; Mood/Affect:normal       MICRO:   L upper cut lip:Orthokeratosis of epidermis with a proliferation of nests of basaloid cells, with peripheral palisading and a haphazard arrangement in the center extending into the dermis, forming nodules.  The tumor cells have hyperchromatic nuclei. Poor cytoplasm and intercellular bridging.    A/P:  1. L ala basal cell carcinoma   2. L upper cut lip r/o basal cell carcinoma   TANGENTIAL BIOPSY IN HOUSE:  After consent, anesthesia with LEC and prep, tangential excision performed and dx above confirmed with frozen section histology.  No complications and routine wound care.      I have personally reviewed all specimens and/or slides and used them with my medical judgement to determine or confirm the final diagnosis.     Patient told result basal cell carcinoma schedule   BENIGN LESIONS DISCUSSED WITH PATIENT:  I discussed the specifics of tumor, prognosis, and genetics of benign lesions.  I explained that treatment of these lesions would be purely cosmetic and not medically neccessary.  I discussed with patient different removal options including excision, cautery and /or laser.      Nature and genetics of benign skin lesions dicussed with patient.  Signs and Symptoms of skin cancer discussed with patient.  Patient encouraged to perform monthly skin exams.  UV precautions reviewed with patient.  Patient to follow up with Primary Care provider regarding elevated blood pressure.  Skin care regimen reviewed with patient: Eliminate harsh soaps, i.e. Dial, zest, irsih spring; Mild soaps such as Cetaphil or Dove sensitive skin, avoid hot or cold showers, aggressive use of emollients including vanicream, cetaphil or cerave discussed with patient.    Risks of non-melanoma skin cancer discussed with patient   Return to clinic next appt    PROCEDURE NOTE  L ala basal cell carcinoma   MOHS:   Location    After PGACAC discussed with patient, decision for Mohs  surgery was made. Indication for Mohs was Location. Patient confirmed the site with Dr. Wilson.  After anesthesia with LEC, the tumor was excised using standard Mohs technique in 2 stages(s).  CLEAR MARGINS OBTAINED and Final defect size was 2.1 x 1.7 cm.       REPAIR NASOLABIAL TRANSPOSITION FLAP AND CHEEK ADVANCEMENT FLAP: Because of the location and full-thickness nature of the defect, and to avoid distortion, a nasolabial transposition flap with cheek advancement flap closure at the donor site was planned. After informed consent, the patient was prepped and draped in the usual fashion and local anesthesia was obtained. A Burow's triangle was excised superiorly into the inner canthus, and an incision was made inferiorly in the nasolabial fold to the lateral commissure of the mouth and then extended superiorly to elevate a nasolabial flap in the subcutaneous plane measuring 3.5 x 3.7 cm.      This left a large tight defect on the cheek and to close the donor site defect, a cheek advancement flap was elevated by undermining the skin of the cheek in the subcutaneous plane laterally beyond the lateral canthus and below the orbicularis muscle of the eyelid above the orbital rim. This created a cheek flap measuring 4.1 x 3 cm. After hemostasis was obtained, the subcutaneous bulk was advanced medially; it was attached to the periosteum of the pyriform aperture of the maxilla and to the ascending portion of the maxilla. The remainder was closed along the nasolabial fold in a layered fashion.     Next attention was turned back to the nasolabial transposition flap. The primary defect on the nose was undermined. The nasolabial transposition flap was distally thinned, transposed into place, amputated at the tip to fit the defect, and sutured to the nose defect in a layered fashion using Vicryl and Fast Absorbing Plain Gut sutures.  EBL minimal; complications none; wound care routine.  The patient was discharged in good  condition and will return in one week for wound evaluation.

## 2020-01-09 NOTE — LETTER
1/9/2020         RE: Janet Cope  [address]  [facility] [address]        Dear Colleague,    Thank you for referring your patient, Janet Cope, to the [facility]. Please see a copy of my visit note below.    Surgical Office Location:  [facility]  [address] [place] [address]      Janet Cope is a 85 year old year old female patient here today for evaluation and managment of basal cell carcinoma on left ala.  She ntoes spot on lip today.   .  Patient states this has been present for ?.  Patient reports the following symptoms:  new.  Patient reports the following previous treatments none.  These treatments did not work.  Patient reports the following modifying factors none.  Associated symptoms: none.  Patient has no other skin complaints today.  Remainder of the HPI, Meds, PMH, Allergies, FH, and SH was reviewed in chart.      Past Medical History:   Diagnosis Date     Anxiety      Basal cell carcinoma      CKD (chronic kidney disease) stage 3, GFR 30-59 ml/min (H)      Depression      Diverticulosis of colon (without mention of hemorrhage) 8/02    colonoscopy negative other than sigmoid diverticulosis     Enthesopathy of hip region     Bilateral     Essential hypertension, benign      Generalized osteoarthrosis, unspecified site      Macular degeneration      Macular degeneration, dry      Osteoporosis, unspecified      Other specified gastritis without mention of hemorrhage      Personal history of colonic polyps 7/03     had hyperplastic polyps 2013     Personal history of malignant neoplasm of breast 1995 & 9/01    left breast cancer (1995), right breast cancer (9/01); patient sees Dr. Martinez regularly     Postmenopausal bleeding 5/02    endometrial biopsies negative; s/p D & C     Type 2 diabetes mellitus with diabetic chronic kidney disease  (goal A1C<7) 10/24/2015     Unspecified hemorrhoids without mention of  complication 10/05    Internal, s/p banding ligature       Past Surgical History:   Procedure Laterality Date     BREAST LUMPECTOMY, RT/LT      Breast Lumpectomy RT     BREAST LUMPECTOMY, RT/LT  2001    left     BREAST RADIATION TX RT/LT      right      BREAST RADIATION TX RT/LT  2001    left     HYSTERECTOMY, VAGINAL       STRESS ECHO (METRO)  2002    normal        Family History   Problem Relation Age of Onset     Diabetes Mother              Heart Disease Mother        Social History     Socioeconomic History     Marital status:      Spouse name: Not on file     Number of children: Not on file     Years of education: Not on file     Highest education level: Not on file   Occupational History     Not on file   Social Needs     Financial resource strain: Not on file     Food insecurity:     Worry: Not on file     Inability: Not on file     Transportation needs:     Medical: Not on file     Non-medical: Not on file   Tobacco Use     Smoking status: Former Smoker     Last attempt to quit: 10/10/1981     Years since quittin.2     Smokeless tobacco: Never Used   Substance and Sexual Activity     Alcohol use: Yes     Alcohol/week: 30.0 standard drinks     Comment: 1-2 glass of wine daily     Drug use: No     Sexual activity: Never     Partners: Male   Lifestyle     Physical activity:     Days per week: Not on file     Minutes per session: Not on file     Stress: Not on file   Relationships     Social connections:     Talks on phone: Not on file     Gets together: Not on file     Attends [employer] service: Not on file     Active member of club or organization: Not on file     Attends meetings of clubs or organizations: Not on file     Relationship status: Not on file     Intimate partner violence:     Fear of current or ex partner: Not on file     Emotionally abused: Not on file     Physically abused: Not on file     Forced sexual activity: Not on file   Other Topics Concern      Parent/sibling w/ CABG, MI or angioplasty before 65F 55M? Not Asked   Social History Narrative     Not on file       Outpatient Encounter Medications as of 1/9/2020   Medication Sig Dispense Refill     acetaminophen (ARTHRITIS PAIN RELIEVER) 650 MG CR tablet Take 1 tablet (650 mg) by mouth nightly as needed for mild pain or fever       amLODIPine (NORVASC) 5 MG tablet Take 1 tablet (5 mg) by mouth daily 90 tablet 3     ASPIRIN 81 MG OR TABS 1 tab po QD (Once per day)       atorvastatin (LIPITOR) 20 MG tablet Take 1 tablet (20 mg) by mouth daily 90 tablet 3     blood glucose (ONETOUCH ULTRA) test strip USE TO TEST ONCE DAILY 50 strip 11     busPIRone (BUSPAR) 15 MG tablet 1/2 tab daily as needed for anxiety (rare use)       FISH OIL 1000 MG OR CAPS 3 qd       losartan (COZAAR) 50 MG tablet Take 1 tablet (50 mg) by mouth daily 90 tablet 3     NEW MED One Touch Lancet device 1 0     omeprazole (PRILOSEC) 20 MG DR capsule Take 1 capsule (20 mg) by mouth 2 times daily 180 capsule 3     order for DME One Touch Ultra test strips. Checking sugars daily 50 strip 11     No facility-administered encounter medications on file as of 1/9/2020.              Review Of Systems  Skin: As above  Eyes: negative  Ears/Nose/Throat: negative  Respiratory: No shortness of breath, dyspnea on exertion, cough, or hemoptysis  Cardiovascular: negative  Gastrointestinal: negative  Genitourinary: negative  Musculoskeletal: negative  Neurologic: negative  Psychiatric: negative  Hematologic/Lymphatic/Immunologic: negative  Endocrine: negative      O:   NAD, WDWN, Alert & Oriented, Mood & Affect wnl, Vitals stable   Here today alone   BP (!) 152/72   Pulse 94   SpO2 99%    General appearance normal   Vitals stable   Alert, oriented and in no acute distress      Following lymph nodes palpated: Occipital, Cervical, Supraclavicular no lad   L ala 1.2cm pink pearly papule   L cut upper lip 5mm pink pearly papule      The remainder of expanded problem  focused exam was unremarkable; the following areas were examined:  scalp/hair, conjunctiva/lids, face, neck, lips, chest, digits/nails, RUE, LUE.      Eyes: Conjunctivae/lids:Normal     ENT: Lips, buccal mucosa, tongue: normal    MSK:Normal    Cardiovascular: peripheral edema none    Pulm: Breathing Normal    Lymph Nodes: No Head and Neck Lymphadenopathy     Neuro/Psych: Orientation:Alert and Orientedx3 ; Mood/Affect:normal       MICRO:   L upper cut lip:Orthokeratosis of epidermis with a proliferation of nests of basaloid cells, with peripheral palisading and a haphazard arrangement in the center extending into the dermis, forming nodules.  The tumor cells have hyperchromatic nuclei. Poor cytoplasm and intercellular bridging.    A/P:  1. L ala basal cell carcinoma   2. L upper cut lip r/o basal cell carcinoma   TANGENTIAL BIOPSY IN HOUSE:  After consent, anesthesia with LEC and prep, tangential excision performed and dx above confirmed with frozen section histology.  No complications and routine wound care.      I have personally reviewed all specimens and/or slides and used them with my medical judgement to determine or confirm the final diagnosis.     Patient told result basal cell carcinoma schedule   BENIGN LESIONS DISCUSSED WITH PATIENT:  I discussed the specifics of tumor, prognosis, and genetics of benign lesions.  I explained that treatment of these lesions would be purely cosmetic and not medically neccessary.  I discussed with patient different removal options including excision, cautery and /or laser.      Nature and genetics of benign skin lesions dicussed with patient.  Signs and Symptoms of skin cancer discussed with patient.  Patient encouraged to perform monthly skin exams.  UV precautions reviewed with patient.  Patient to follow up with Primary Care provider regarding elevated blood pressure.  Skin care regimen reviewed with patient: Eliminate harsh soaps, i.e. Dial, zest, irsih spring; Mild soaps  such as Cetaphil or Dove sensitive skin, avoid hot or cold showers, aggressive use of emollients including vanicream, cetaphil or cerave discussed with patient.    Risks of non-melanoma skin cancer discussed with patient   Return to clinic next appt    PROCEDURE NOTE  L ala basal cell carcinoma   MOHS:   Location    After PGACAC discussed with patient, decision for Mohs surgery was made. Indication for Mohs was Location. Patient confirmed the site with Dr. Wilson.  After anesthesia with LEC, the tumor was excised using standard Mohs technique in 2 stages(s).  CLEAR MARGINS OBTAINED and Final defect size was 2.1 x 1.7 cm.       REPAIR NASOLABIAL TRANSPOSITION FLAP AND CHEEK ADVANCEMENT FLAP: Because of the location and full-thickness nature of the defect, and to avoid distortion, a nasolabial transposition flap with cheek advancement flap closure at the donor site was planned. After informed consent, the patient was prepped and draped in the usual fashion and local anesthesia was obtained. A Burow's triangle was excised superiorly into the inner canthus, and an incision was made inferiorly in the nasolabial fold to the lateral commissure of the mouth and then extended superiorly to elevate a nasolabial flap in the subcutaneous plane measuring 3.5 x 3.7 cm.      This left a large tight defect on the cheek and to close the donor site defect, a cheek advancement flap was elevated by undermining the skin of the cheek in the subcutaneous plane laterally beyond the lateral canthus and below the orbicularis muscle of the eyelid above the orbital rim. This created a cheek flap measuring 4.1 x 3 cm. After hemostasis was obtained, the subcutaneous bulk was advanced medially; it was attached to the periosteum of the pyriform aperture of the maxilla and to the ascending portion of the maxilla. The remainder was closed along the nasolabial fold in a layered fashion.     Next attention was turned back to the nasolabial  transposition flap. The primary defect on the nose was undermined. The nasolabial transposition flap was distally thinned, transposed into place, amputated at the tip to fit the defect, and sutured to the nose defect in a layered fashion using Vicryl and Fast Absorbing Plain Gut sutures.  EBL minimal; complications none; wound care routine.  The patient was discharged in good condition and will return in one week for wound evaluation.      Again, thank you for allowing me to participate in the care of your patient.        Sincerely,        Lucas Wilson MD

## 2020-01-09 NOTE — NURSING NOTE
"Initial BP (!) 152/72   Pulse 94   SpO2 99%  Estimated body mass index is 29.58 kg/m  as calculated from the following:    Height as of 5/23/19: 1.6 m (5' 3\").    Weight as of 10/15/19: 75.8 kg (167 lb). .    REDD Durant-BSN-N  [facility]  [phone]  "

## 2020-01-09 NOTE — TELEPHONE ENCOUNTER
----- Message from Lucas Wilson MD sent at 1/9/2020 11:33 AM CST -----  L upper lip basal cell carcinoma schedule

## 2020-01-09 NOTE — PATIENT INSTRUCTIONS
Sutured Wound Care     [facility]: [phone]    [facility]: [phone]          ? No strenuous activity for 48 hours. Resume moderate activity in 48 hours. No heavy exercising until you are seen for follow up in one week.     ? Take Tylenol as needed for discomfort.                         ? Do not drink alcoholic beverages for 48 hours.     ? Keep the pressure bandage in place for 24 hours. If the bandage becomes blood tinged or loose, reinforce it with gauze and tape.        (Refer to the reverse side of this page for management of bleeding).    ? Remove pressure bandage in 24 hours     ? Leave the flat bandage in place until your follow up appointment.    ? Keep the bandage dry. Wash around it carefully.    ? If the tape becomes soiled or starts to come off, reinforce it with additional paper tape.    ? Do not smoke for 3 weeks; smoking is detrimental to wound healing.    ? It is normal to have swelling and bruising around the surgical site. The bruising will fade in approximately 10-14 days. Elevate the area to reduce swelling.    ? Numbness, itchiness and sensitivity to temperature changes can occur after surgery and may take up to 18 months to normalize.      POSSIBLE COMPLICATIONS    BLEEDIN. Leave the bandage in place.  2. Use tightly rolled up gauze or a cloth to apply direct pressure over the bandage for 20   minutes.  3. Reapply pressure for an additional 20 minutes if necessary  4. Call the office or go to the nearest emergency room if pressure fails to stop the bleeding.  5. Use additional gauze and tape to maintain pressure once the bleeding has stopped.        PAIN:    1. Post operative pain should slowly get better, never worse.  2. A severe increase in pain may indicate a problem. Call the office if this occurs.    In case of emergency phone:Dr Wilson [phone]

## 2020-01-10 NOTE — TELEPHONE ENCOUNTER
Called and spoke to patient.    Educated patient on biopsy results- BCC.    Patient had a mohs procedure on 1/9/20- BCC, left ala. Patient stated she has never been in so much pain- wishes Dr. Wilson would have given her something stronger than Tylenol. Advised patient to continue taking tylenol/ibuprofen for pain and to call back if the pain worsens.    Patient does not want to schedule the other mohs procedure at this time.    Patient voiced understanding.    REDD Durant-BSN-PHN  [facility]  [phone]

## 2020-01-15 ENCOUNTER — ALLIED HEALTH/NURSE VISIT (OUTPATIENT)
Dept: DERMATOLOGY | Facility: CLINIC | Age: 85
End: 2020-01-15
Payer: MEDICARE

## 2020-01-15 DIAGNOSIS — Z48.01 ENCOUNTER FOR CHANGE OR REMOVAL OF SURGICAL WOUND DRESSING: Primary | ICD-10-CM

## 2020-01-15 PROCEDURE — 99207 ZZC NO CHARGE NURSE ONLY: CPT

## 2020-01-15 NOTE — PROGRESS NOTES
Pt returned to clinic for post surgery 1 week follow up bandage change. Pt stated she had quite a bit of pain post surgery- felt she needed something stronger than Tylenol. Bandage removed from left ala, area cleansed with normal saline. Site is healing and wound edges approximating well. Reapplied new steri strips and paper tape.    Advised to watch for signs/sx of infection; spreading redness, drainage, odor, fever. Call or report promptly to clinic. Pt given written instructions and informed to rtc as needed. Patient verbalized understanding.     Carleen RN-BSN-PHN  [facility]  [phone]

## 2020-01-15 NOTE — PATIENT INSTRUCTIONS

## 2020-01-15 NOTE — TELEPHONE ENCOUNTER
Patient was in clinic for 1 week bandage change post mohs (left ala).     Patient was informed (a second time) that the biopsy on her left upper cut lip did come back as a BCC. Patient declined further treatment at this time.     Patient stated her last mohs procedure was too traumatic and painful- will think about mohs at a later date, but declines any immediate tx.    Letter and BCC pamphlet sent to patient (see above).    REDD Durant-BSN-PHN  [facility]  [phone]

## 2020-01-16 NOTE — TELEPHONE ENCOUNTER
Refusal letter sent to patient:    Dear Mrs. Cope,     The tissue sample that was removed from your lip is a basal cell skin cancer, one of the most common types of skin cancer. This type of skin cancer rarely spreads to other parts of the body. However, it should be treated, because this cancer can invade its surrounding tissue over time. I have included an educational pamphlet for your review.     Recommendations :  MOHS Surgery with Dr. Lucas Wilson, Dermatologist to remove skin cancers.  You have determined that the procedure would be too much for you at this time per our phone conversation and office visit.     Thank you for allowing me to be involved in your health care and for choosing [facility].  If you have any questions or concerns please feel free to contact me at [phone].     Sincerely,     Dr. Lucas Wilson

## 2020-01-23 DIAGNOSIS — N18.30 TYPE 2 DIABETES MELLITUS WITH STAGE 3 CHRONIC KIDNEY DISEASE, WITHOUT LONG-TERM CURRENT USE OF INSULIN (H): ICD-10-CM

## 2020-01-23 DIAGNOSIS — R80.9 PROTEINURIA, UNSPECIFIED TYPE: ICD-10-CM

## 2020-01-23 DIAGNOSIS — N18.30 CKD (CHRONIC KIDNEY DISEASE) STAGE 3, GFR 30-59 ML/MIN (H): ICD-10-CM

## 2020-01-23 DIAGNOSIS — E11.22 TYPE 2 DIABETES MELLITUS WITH STAGE 3 CHRONIC KIDNEY DISEASE, WITHOUT LONG-TERM CURRENT USE OF INSULIN (H): ICD-10-CM

## 2020-01-23 LAB
ANION GAP SERPL CALCULATED.3IONS-SCNC: 7 MMOL/L (ref 3–14)
BUN SERPL-MCNC: 24 MG/DL (ref 7–30)
CALCIUM SERPL-MCNC: 9.5 MG/DL (ref 8.5–10.1)
CHLORIDE SERPL-SCNC: 108 MMOL/L (ref 94–109)
CO2 SERPL-SCNC: 24 MMOL/L (ref 20–32)
CREAT SERPL-MCNC: 1.32 MG/DL (ref 0.52–1.04)
CREAT UR-MCNC: 105 MG/DL
GFR SERPL CREATININE-BSD FRML MDRD: 37 ML/MIN/{1.73_M2}
GLUCOSE SERPL-MCNC: 111 MG/DL (ref 70–99)
HBA1C MFR BLD: 5.6 % (ref 0–5.6)
MICROALBUMIN UR-MCNC: 19 MG/L
MICROALBUMIN/CREAT UR: 18 MG/G CR (ref 0–25)
POTASSIUM SERPL-SCNC: 4.1 MMOL/L (ref 3.4–5.3)
SODIUM SERPL-SCNC: 139 MMOL/L (ref 133–144)

## 2020-01-23 PROCEDURE — 83036 HEMOGLOBIN GLYCOSYLATED A1C: CPT | Performed by: INTERNAL MEDICINE

## 2020-01-23 PROCEDURE — 80048 BASIC METABOLIC PNL TOTAL CA: CPT | Performed by: INTERNAL MEDICINE

## 2020-01-23 PROCEDURE — 82043 UR ALBUMIN QUANTITATIVE: CPT | Performed by: INTERNAL MEDICINE

## 2020-01-23 PROCEDURE — 36415 COLL VENOUS BLD VENIPUNCTURE: CPT | Performed by: INTERNAL MEDICINE

## 2020-05-18 DIAGNOSIS — K21.9 GASTROESOPHAGEAL REFLUX DISEASE WITHOUT ESOPHAGITIS: ICD-10-CM

## 2020-06-22 DIAGNOSIS — E78.5 HYPERLIPIDEMIA LDL GOAL <100: ICD-10-CM

## 2020-06-22 RX ORDER — ATORVASTATIN CALCIUM 20 MG/1
TABLET, FILM COATED ORAL
Qty: 90 TABLET | Refills: 0 | Status: SHIPPED | OUTPATIENT
Start: 2020-06-22 | End: 2020-08-12

## 2020-06-22 NOTE — TELEPHONE ENCOUNTER
Prescription approved per [facility] Refill Protocol.    Kaur LYNCHN, RN, PHN

## 2020-06-29 DIAGNOSIS — N18.30 CKD (CHRONIC KIDNEY DISEASE) STAGE 3, GFR 30-59 ML/MIN (H): Primary | ICD-10-CM

## 2020-06-29 DIAGNOSIS — I10 ESSENTIAL HYPERTENSION, BENIGN: ICD-10-CM

## 2020-06-29 RX ORDER — LOSARTAN POTASSIUM 50 MG/1
TABLET ORAL
Qty: 90 TABLET | Refills: 0 | Status: SHIPPED | OUTPATIENT
Start: 2020-06-29 | End: 2020-08-12

## 2020-06-29 NOTE — TELEPHONE ENCOUNTER
Routing refill request to provider for review/approval because:  Labs out of range:  BP  Labs not current:  Creat

## 2020-06-30 NOTE — TELEPHONE ENCOUNTER
Last BP elevated when seen in Derm Jan 2020.  Pt  due for labs re: kidney function and f/u appt with MD   Please call pt and schedule for the following appts in the next 30 days:    1. Nonfasting lab appt with [facility]. [facility] and reducing risks for  exposures to other patients.   2. Virtual visit appt with me. Probably best to set up tele visit   3. Check with pt to see if has a blood pressure monitor at home. If so, then pt to check blood pressure in AM and PM for 3 days prior to the appt with me. If does not have a home blood pressure monitor, then also schedule pt for a nurse-only blood pressure check in the [facility] at same time when has the lab appt so will have tthe blood pressure status  information when pt has virtual visit with me.    Meds refilled for 90 days. Further refills will be addressed at future virtual visit.

## 2020-07-15 NOTE — TELEPHONE ENCOUNTER
Informed pt of msg  Pt scheduled for labs on 07/2020  Pt wants to call back to schedule a visit dr eubanks

## 2020-07-20 DIAGNOSIS — I10 ESSENTIAL HYPERTENSION, BENIGN: ICD-10-CM

## 2020-07-20 DIAGNOSIS — N18.30 CKD (CHRONIC KIDNEY DISEASE) STAGE 3, GFR 30-59 ML/MIN (H): ICD-10-CM

## 2020-07-20 LAB
ANION GAP SERPL CALCULATED.3IONS-SCNC: 8 MMOL/L (ref 3–14)
BUN SERPL-MCNC: 25 MG/DL (ref 7–30)
CALCIUM SERPL-MCNC: 9.2 MG/DL (ref 8.5–10.1)
CHLORIDE SERPL-SCNC: 108 MMOL/L (ref 94–109)
CO2 SERPL-SCNC: 23 MMOL/L (ref 20–32)
CREAT SERPL-MCNC: 1.25 MG/DL (ref 0.52–1.04)
GFR SERPL CREATININE-BSD FRML MDRD: 39 ML/MIN/{1.73_M2}
GLUCOSE SERPL-MCNC: 110 MG/DL (ref 70–99)
POTASSIUM SERPL-SCNC: 3.8 MMOL/L (ref 3.4–5.3)
SODIUM SERPL-SCNC: 139 MMOL/L (ref 133–144)

## 2020-07-20 PROCEDURE — 36415 COLL VENOUS BLD VENIPUNCTURE: CPT | Performed by: INTERNAL MEDICINE

## 2020-07-20 PROCEDURE — 80048 BASIC METABOLIC PNL TOTAL CA: CPT | Performed by: INTERNAL MEDICINE

## 2020-07-20 NOTE — TELEPHONE ENCOUNTER
Routing refill request to provider for review/approval because:  Labs out of range:  PEDRO and Cr

## 2020-07-22 DIAGNOSIS — I10 ESSENTIAL HYPERTENSION, BENIGN: ICD-10-CM

## 2020-07-22 RX ORDER — AMLODIPINE BESYLATE 5 MG/1
TABLET ORAL
Qty: 30 TABLET | Refills: 0 | Status: SHIPPED | OUTPATIENT
Start: 2020-07-22 | End: 2020-07-31 | Stop reason: DRUGHIGH

## 2020-07-22 RX ORDER — AMLODIPINE BESYLATE 5 MG/1
TABLET ORAL
Qty: 90 TABLET | OUTPATIENT
Start: 2020-07-22

## 2020-07-22 NOTE — TELEPHONE ENCOUNTER
"Patient informed. She does have BP monitor. Below says \"check BP aM and PM for days \". Told her 4 days but advise if differently as it was not specified. She is going out of town for a couple days and says she will call back to schedule appointment. However, she says she is not going to fill script if it is only for 30 days as it will be too costly. Please advise if 90 day script can be filled and update patient and pharmacy (had previously denied 90 day supply request from pharmacy in separate encounter)  "

## 2020-07-22 NOTE — TELEPHONE ENCOUNTER
"Informed pharmacy 30 day supply already sent with notes from Dr. Kovacs in script : \"Do NOT request 90 day supply from clinic at this time\"  "

## 2020-07-22 NOTE — TELEPHONE ENCOUNTER
" Call pt. Had labs done 7/20/20. Kidney function mildly reduced but stable. Nonfasting blood sugar and electrolytes OK.  See RF request request 6/29/20. Was to set up virtual appt with me re\": BP but none made.  Check to see if pt has BP monitor. If so, then check BP aM and PM for days and write down results  and schedule virtual tele visit with me in the next 30 days. If doesn't have BP monitor, then schedule nurse only BP check with [facility] in the next week and still have virtual appt with me  Sometime after in the next 30 days. RF done for 30 days. Will address future RFs  After appt with me  "

## 2020-07-31 ENCOUNTER — OFFICE VISIT (OUTPATIENT)
Dept: INTERNAL MEDICINE | Facility: CLINIC | Age: 85
End: 2020-07-31
Payer: MEDICARE

## 2020-07-31 VITALS
SYSTOLIC BLOOD PRESSURE: 146 MMHG | WEIGHT: 169 LBS | TEMPERATURE: 97.8 F | DIASTOLIC BLOOD PRESSURE: 78 MMHG | BODY MASS INDEX: 29.94 KG/M2 | OXYGEN SATURATION: 98 % | RESPIRATION RATE: 16 BRPM | HEART RATE: 94 BPM

## 2020-07-31 DIAGNOSIS — E78.5 HYPERLIPIDEMIA LDL GOAL <100: ICD-10-CM

## 2020-07-31 DIAGNOSIS — I10 ESSENTIAL HYPERTENSION, BENIGN: Primary | ICD-10-CM

## 2020-07-31 DIAGNOSIS — N18.30 CKD (CHRONIC KIDNEY DISEASE) STAGE 3, GFR 30-59 ML/MIN (H): ICD-10-CM

## 2020-07-31 PROCEDURE — 99214 OFFICE O/P EST MOD 30 MIN: CPT | Performed by: INTERNAL MEDICINE

## 2020-07-31 RX ORDER — AMLODIPINE BESYLATE 10 MG/1
10 TABLET ORAL DAILY
Qty: 90 TABLET | Refills: 3 | Status: SHIPPED | OUTPATIENT
Start: 2020-07-31 | End: 2020-08-12

## 2020-07-31 ASSESSMENT — ANXIETY QUESTIONNAIRES
3. WORRYING TOO MUCH ABOUT DIFFERENT THINGS: NOT AT ALL
7. FEELING AFRAID AS IF SOMETHING AWFUL MIGHT HAPPEN: NOT AT ALL
1. FEELING NERVOUS, ANXIOUS, OR ON EDGE: MORE THAN HALF THE DAYS
IF YOU CHECKED OFF ANY PROBLEMS ON THIS QUESTIONNAIRE, HOW DIFFICULT HAVE THESE PROBLEMS MADE IT FOR YOU TO DO YOUR WORK, TAKE CARE OF THINGS AT HOME, OR GET ALONG WITH OTHER PEOPLE: NOT DIFFICULT AT ALL
6. BECOMING EASILY ANNOYED OR IRRITABLE: NOT AT ALL
5. BEING SO RESTLESS THAT IT IS HARD TO SIT STILL: NOT AT ALL
GAD7 TOTAL SCORE: 2
2. NOT BEING ABLE TO STOP OR CONTROL WORRYING: NOT AT ALL

## 2020-07-31 ASSESSMENT — PATIENT HEALTH QUESTIONNAIRE - PHQ9
SUM OF ALL RESPONSES TO PHQ QUESTIONS 1-9: 4
5. POOR APPETITE OR OVEREATING: NOT AT ALL

## 2020-07-31 NOTE — PATIENT INSTRUCTIONS
Increase Amlodipine to 5mg tab, 2 tabs (total 10mg) daily for blood pressure   Continue other medications   In 1-2 weeks, then call nurseline [phone] with update re: blood pressure status and if any new ankle swelling.  If doing OK, then fill future prescription on file at pharmacy for Amlodipine 10mg tab, 1 tab daily  Call  [phone]  to schedule a future lab appointment  fasting in 3 months (late October).  OK for outside/curbside lab. For fasting labs, please refrain from eating for 8 hours or more.  Be sure to  drink water and take your  medications the day of the test.   I would recommend you receive an influenza (flu) vaccine  this Fall (September or October)

## 2020-07-31 NOTE — PROGRESS NOTES
Subjective     Janet Cope is a 85 year old female who presents to clinic today for the following health issues:    HPI       Hypertension Follow-up      Do you check your blood pressure regularly outside of the clinic? Yes     Are you following a low salt diet? Yes    Are your blood pressures ever more than 140 on the top number (systolic) OR more   than 90 on the bottom number (diastolic), for example 140/90? Yes      How many servings of fruits and vegetables do you eat daily?  4 or more    On average, how many sweetened beverages do you drink each day (Examples: soda, juice, sweet tea, etc.  Do NOT count diet or artificially sweetened beverages)?   0    How many days per week do you exercise enough to make your heart beat faster? 3 less due to legs and knees    How many minutes a day do you exercise enough to make your heart beat faster? 9 or less    How many days per week do you miss taking your medication? 0    Pt's past medical history, family history, habits, medications and allergies were reviewed with the patient today.  See snap shot for  HCM status. Most recent lab results reviewed with pt. Problem list and histories reviewed & adjusted, as indicated.  Additional history as below:    Denies chest pain, shortness of breath, abdominal pain, headache, vision changes or side effects with medications.  Had right knee Euflexxa injection few weeks ago. Knee better   BPs at home  140 to 150s systolic     Component      Latest Ref Rng & Units 1/23/2020 7/20/2020   Sodium      133 - 144 mmol/L 139 139   Potassium      3.4 - 5.3 mmol/L 4.1 3.8   Chloride      94 - 109 mmol/L 108 108   Carbon Dioxide      20 - 32 mmol/L 24 23   Anion Gap      3 - 14 mmol/L 7 8   Glucose      70 - 99 mg/dL 111 (H) 110 (H)   Urea Nitrogen      7 - 30 mg/dL 24 25   Creatinine      0.52 - 1.04 mg/dL 1.32 (H) 1.25 (H)   GFR Estimate      >60 mL/min/1.73:m2 37 (L) 39 (L)   GFR Estimate If Black      >60 mL/min/1.73:m2 42 (L) 45 (L)  "  Calcium      8.5 - 10.1 mg/dL 9.5 9.2   Bilirubin Total      0.2 - 1.3 mg/dL       Additional ROS:   Constitutional, HEENT, Cardiovascular, Pulmonary, GI and , Neuro, MSK and Psych review of systems/symptoms are otherwise negative or unchanged from previous, except as noted above.      OBJECTIVE:  BP (!) 146/78   Pulse 94   Temp 97.8  F (36.6  C) (Temporal)   Resp 16   Wt 76.7 kg (169 lb)   SpO2 98%   BMI 29.94 kg/m     Estimated body mass index is 29.94 kg/m  as calculated from the following:    Height as of 5/23/19: 1.6 m (5' 3\").    Weight as of this encounter: 76.7 kg (169 lb).     Neck: no adenopathy. Thyroid normal to palpation. No bruits  Pulm: Lungs clear to auscultation   CV: Regular rates and rhythm  GI: Soft, nontender, Normal active bowel sounds, No hepatosplenomegaly or masses palpable  Ext: Peripheral pulses intact. Trace BLE edema.  Neuro: Normal strength and tone, sensory exam grossly normal    Assessment/Plan: (See plan discussion below for further details)  1. Essential hypertension, benign  Uncontrolled. Given CKD, will increase Amlodipine and continue same Losartan dose. Will update clinic in 2weeks  - amLODIPine (NORVASC) 10 MG tablet; Take 1 tablet (10 mg) by mouth daily  Dispense: 90 tablet; Refill: 3    2. CKD (chronic kidney disease) stage 3, GFR 30-59 ml/min (H)   Slight improved. Overall stable. Recheck 3 mos  - Basic metabolic panel; Future    3. Hyperlipidemia LDL goal <100  On statin. Due for FLP recheck 3 mos as previously ordered      Plan discussion:   Increase Amlodipine to 5mg tab, 2 tabs (total 10mg) daily for blood pressure   Continue other medications   In 1-2 weeks, then call nurseline [phone] with update re: blood pressure status and if any new ankle swelling.  If doing OK, then fill future prescription on file at pharmacy for Amlodipine 10mg tab, 1 tab daily  Call  [phone]  to schedule a future lab appointment  fasting in 3 months (late October).  OK for " outside/curbside lab. For fasting labs, please refrain from eating for 8 hours or more.  Be sure to  drink water and take your  medications the day of the test.   I would recommend you receive an influenza (flu) vaccine  this Fall (September or October)         Frank Kovacs MD  Internal Medicine Department  [facility]    (Chart documentation was completed, in part, with [employer] voice-recognition software. Even though reviewed, some grammatical, spelling, and word errors may remain.)

## 2020-08-01 ASSESSMENT — ANXIETY QUESTIONNAIRES: GAD7 TOTAL SCORE: 2

## 2020-08-10 ENCOUNTER — TELEPHONE (OUTPATIENT)
Dept: INTERNAL MEDICINE | Facility: CLINIC | Age: 85
End: 2020-08-10

## 2020-08-10 DIAGNOSIS — E78.5 HYPERLIPIDEMIA LDL GOAL <100: ICD-10-CM

## 2020-08-10 DIAGNOSIS — I10 ESSENTIAL HYPERTENSION, BENIGN: ICD-10-CM

## 2020-08-10 NOTE — TELEPHONE ENCOUNTER
Pt  calling: BP readings : 8/2: 98/71 82  at 620am 8/2:145/72 81, 6 pm   8/3//63/81 , 134/69/89    8/4//81/86 in am 128/82/93 in evening   8/5/20 161/82/84,737 pm 124/60/90.  8/6/20 - 625 am 155/83/89 746pm  123/73/95 then   8/7/20 8 pm 132 / 63 /87   8/8/20-  157/ 81/ 84 am then jeremias 626pm 127/75/87   8/10 am high become of storm  171 /87/ 86 in am .Aileen Thornton RN    Pt has prescriptions at pharmacy based on this should she continue same dosing?.Aileen Thornton RN

## 2020-08-12 RX ORDER — AMLODIPINE BESYLATE 10 MG/1
10 TABLET ORAL DAILY
Qty: 90 TABLET | Refills: 3 | Status: SHIPPED | OUTPATIENT
Start: 2020-08-12 | End: 2021-08-05

## 2020-08-12 RX ORDER — ATORVASTATIN CALCIUM 20 MG/1
20 TABLET, FILM COATED ORAL DAILY
Qty: 90 TABLET | Refills: 3 | Status: SHIPPED | OUTPATIENT
Start: 2020-08-12 | End: 2021-08-15

## 2020-08-12 RX ORDER — LOSARTAN POTASSIUM 50 MG/1
50 TABLET ORAL DAILY
Qty: 90 TABLET | Refills: 3 | Status: SHIPPED | OUTPATIENT
Start: 2020-08-12 | End: 2021-08-15

## 2020-08-12 NOTE — TELEPHONE ENCOUNTER
BPs run a little high in AM and then are normal later in the day. I would continue with Losartan 50mg daily  and  Amlodipine 10mg daily but take the Amlodipine in the evening when also takes the Atorvastatin and take the Losartan in the AM. I have sent a prescription to [facility] for the new Amlodipine 10mg tab (pt has been taking two 5mg tabs of Amlodipine together since recent clinic visit).  Repeat fasting labs in October

## 2020-10-07 DIAGNOSIS — E78.5 HYPERLIPIDEMIA LDL GOAL <100: ICD-10-CM

## 2020-10-19 DIAGNOSIS — E78.5 HYPERLIPIDEMIA LDL GOAL <100: ICD-10-CM

## 2020-10-19 LAB
ALBUMIN SERPL-MCNC: 3.7 G/DL (ref 3.4–5)
ALP SERPL-CCNC: 73 U/L (ref 40–150)
ALT SERPL W P-5'-P-CCNC: 23 U/L (ref 0–50)
ANION GAP SERPL CALCULATED.3IONS-SCNC: 5 MMOL/L (ref 3–14)
AST SERPL W P-5'-P-CCNC: 18 U/L (ref 0–45)
BILIRUB SERPL-MCNC: 0.7 MG/DL (ref 0.2–1.3)
BUN SERPL-MCNC: 25 MG/DL (ref 7–30)
CALCIUM SERPL-MCNC: 9.6 MG/DL (ref 8.5–10.1)
CHLORIDE SERPL-SCNC: 107 MMOL/L (ref 94–109)
CHOLEST SERPL-MCNC: 186 MG/DL
CO2 SERPL-SCNC: 26 MMOL/L (ref 20–32)
CREAT SERPL-MCNC: 1.13 MG/DL (ref 0.52–1.04)
GFR SERPL CREATININE-BSD FRML MDRD: 44 ML/MIN/{1.73_M2}
GLUCOSE SERPL-MCNC: 112 MG/DL (ref 70–99)
HDLC SERPL-MCNC: 85 MG/DL
LDLC SERPL CALC-MCNC: 83 MG/DL
NONHDLC SERPL-MCNC: 101 MG/DL
POTASSIUM SERPL-SCNC: 3.9 MMOL/L (ref 3.4–5.3)
PROT SERPL-MCNC: 8.2 G/DL (ref 6.8–8.8)
SODIUM SERPL-SCNC: 138 MMOL/L (ref 133–144)
TRIGL SERPL-MCNC: 92 MG/DL

## 2020-10-19 PROCEDURE — 36415 COLL VENOUS BLD VENIPUNCTURE: CPT | Performed by: INTERNAL MEDICINE

## 2020-10-19 PROCEDURE — 80061 LIPID PANEL: CPT | Performed by: INTERNAL MEDICINE

## 2020-10-19 PROCEDURE — 80053 COMPREHEN METABOLIC PANEL: CPT | Performed by: INTERNAL MEDICINE

## 2020-10-29 ENCOUNTER — TRANSFERRED RECORDS (OUTPATIENT)
Dept: HEALTH INFORMATION MANAGEMENT | Facility: CLINIC | Age: 85
End: 2020-10-29

## 2020-11-13 ENCOUNTER — TELEPHONE (OUTPATIENT)
Dept: INTERNAL MEDICINE | Facility: CLINIC | Age: 85
End: 2020-11-13

## 2020-11-13 NOTE — TELEPHONE ENCOUNTER
Reason for Call:  Other     Detailed comments: had lab work done 3 weeks ago and hasn't received results    Phone Number Patient can be reached at: Home number on file [phone] (home)    Best Time: today    Can we leave a detailed message on this number? YES    Call taken on 11/13/2020 at 3:03 PM by PÉREZ LEIVA

## 2020-11-17 DIAGNOSIS — K21.9 GASTROESOPHAGEAL REFLUX DISEASE WITHOUT ESOPHAGITIS: ICD-10-CM

## 2021-01-04 ENCOUNTER — OFFICE VISIT (OUTPATIENT)
Dept: INTERNAL MEDICINE | Facility: CLINIC | Age: 86
End: 2021-01-04
Payer: MEDICARE

## 2021-01-04 VITALS
WEIGHT: 165 LBS | OXYGEN SATURATION: 98 % | SYSTOLIC BLOOD PRESSURE: 124 MMHG | BODY MASS INDEX: 29.23 KG/M2 | DIASTOLIC BLOOD PRESSURE: 80 MMHG | HEART RATE: 83 BPM | RESPIRATION RATE: 16 BRPM | TEMPERATURE: 97.9 F

## 2021-01-04 DIAGNOSIS — F32.0 MILD MAJOR DEPRESSION (H): Primary | ICD-10-CM

## 2021-01-04 DIAGNOSIS — F41.8 SITUATIONAL ANXIETY: ICD-10-CM

## 2021-01-04 PROCEDURE — 99213 OFFICE O/P EST LOW 20 MIN: CPT | Performed by: INTERNAL MEDICINE

## 2021-01-04 RX ORDER — BUSPIRONE HYDROCHLORIDE 15 MG/1
TABLET ORAL
Qty: 30 TABLET | Refills: 11 | Status: SHIPPED | OUTPATIENT
Start: 2021-01-04 | End: 2021-08-12

## 2021-01-04 ASSESSMENT — ANXIETY QUESTIONNAIRES
1. FEELING NERVOUS, ANXIOUS, OR ON EDGE: NEARLY EVERY DAY
7. FEELING AFRAID AS IF SOMETHING AWFUL MIGHT HAPPEN: NOT AT ALL
3. WORRYING TOO MUCH ABOUT DIFFERENT THINGS: MORE THAN HALF THE DAYS
IF YOU CHECKED OFF ANY PROBLEMS ON THIS QUESTIONNAIRE, HOW DIFFICULT HAVE THESE PROBLEMS MADE IT FOR YOU TO DO YOUR WORK, TAKE CARE OF THINGS AT HOME, OR GET ALONG WITH OTHER PEOPLE: VERY DIFFICULT
5. BEING SO RESTLESS THAT IT IS HARD TO SIT STILL: SEVERAL DAYS
2. NOT BEING ABLE TO STOP OR CONTROL WORRYING: NEARLY EVERY DAY
6. BECOMING EASILY ANNOYED OR IRRITABLE: NEARLY EVERY DAY
GAD7 TOTAL SCORE: 12

## 2021-01-04 ASSESSMENT — PATIENT HEALTH QUESTIONNAIRE - PHQ9
SUM OF ALL RESPONSES TO PHQ QUESTIONS 1-9: 11
5. POOR APPETITE OR OVEREATING: NOT AT ALL

## 2021-01-04 NOTE — PATIENT INSTRUCTIONS
Buspirone 15mg tab, 1/2-1 tab daily  as needed for anxiety   Continue other medications   Talk with   about compromise and helping each other

## 2021-01-04 NOTE — PROGRESS NOTES
Assessment & Plan     ASSESSMENT:   1. Situational anxiety  Situational related to frustrations with her  and his dementia and getting along well.  Patient declines counseling/therapy at this time.  May use buspirone as needed.  Suggested patient try to talk more with her  to reach compromises and also try to get her daughter more involved with helping offload some of the daily chores all falling on the patient to do  - busPIRone (BUSPAR) 15 MG tablet; 1/2-1 tab daily as needed for anxiety  Dispense: 30 tablet; Refill: 11    2. Mild major depression (H)   See #1 above.  No suicidal ideation.  Patient declines antidepressant medication at this time or counseling.  See suggestions of #1 and plan discussion below also      PLAN:    Buspirone 15mg tab, 1/2-1 tab daily  as needed for anxiety   Continue other medications   Talk with   about compromise and helping each other      (Chart documentation was completed, in part, with [employer] voice-recognition software. Even though reviewed, some grammatical, spelling, and word errors may remain.)    Frank Kovacs MD  Internal Medicine Department  [facility]     Janetskip Cope is a 86 year old female who presents to clinic today for the following health issues     HPI   Chief Complaint   Patient presents with     Discussion     Patient wants to Discuss Depression and Anxiety Sx     PHQ 10/15/2019 7/31/2020 1/4/2021   PHQ-9 Total Score 3 4 11   Q9: Thoughts of better off dead/self-harm past 2 weeks Not at all Not at all Not at all     MARIAN-7 SCORE 7/31/2020 1/4/2021   Total Score 2 12           Patient describes multiple frustrations which are causing some anxiety and depression.  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  Therefore she enjoys watching television but  often wants things to be quite around the house and asked her to turn the TV off.  Patient's  had some dementia and is  "also forgetful and so patient has to repeat herself frequently which adds to frustration.   has not been helping with any chores around the house.  He will also expect her to make 3 meals per day for him and has not been doing simple things like putting dishes in the  and asking her to  for him instead.  Patient has been  for 60+ years.  She states she loves her  but he is frustrating her.  Patient also lives with her daughter.  However, daughter is often gone during the day at work and patient states her daughter tends to take sides with patient's  when getting into discussions.  Patient denies any suicidal ideation.  Does not wish to work with a marriage counselor as she does not think her  would participate.  No physical abuse issues.  Previously has been given a prescription for buspirone for anxiety but has rarely used it.  When taken, patient states it worked well.  No side effects when using the medication before.  Patient does not feel she needs antidepressant medication at this time  Most recent lab results reviewed with pt.      Additional ROS:   Constitutional, HEENT, Cardiovascular, Pulmonary, GI and , Neuro, MSK and Psych review of systems/symptoms are otherwise negative or unchanged from previous, except as noted above.      OBJECTIVE:  /80   Pulse 83   Temp 97.9  F (36.6  C) (Temporal)   Resp 16   Wt 74.8 kg (165 lb)   SpO2 98%   BMI 29.23 kg/m     Estimated body mass index is 29.23 kg/m  as calculated from the following:    Height as of 5/23/19: 1.6 m (5' 3\").    Weight as of this encounter: 74.8 kg (165 lb).     Pulm: Lungs clear to auscultation   CV: Regular rates and rhythm  GI: Soft, nontender, Normal active bowel sounds, No hepatosplenomegaly or masses palpable  Ext: Peripheral pulses intact. No edema.   Gen: Normal appearing affect today. Enjoys talking about her dog              "

## 2021-01-05 ASSESSMENT — ANXIETY QUESTIONNAIRES: GAD7 TOTAL SCORE: 12

## 2021-01-18 PROBLEM — F41.8 SITUATIONAL ANXIETY: Status: ACTIVE | Noted: 2021-01-18

## 2021-01-18 SDOH — HEALTH STABILITY: MENTAL HEALTH: HOW MANY STANDARD DRINKS CONTAINING ALCOHOL DO YOU HAVE ON A TYPICAL DAY?: 1 OR 2

## 2021-01-18 SDOH — HEALTH STABILITY: MENTAL HEALTH: HOW OFTEN DO YOU HAVE A DRINK CONTAINING ALCOHOL?: 4 OR MORE TIMES A WEEK

## 2021-01-18 SDOH — HEALTH STABILITY: MENTAL HEALTH: HOW OFTEN DO YOU HAVE 6 OR MORE DRINKS ON ONE OCCASION?: NEVER

## 2021-01-28 ENCOUNTER — NURSE TRIAGE (OUTPATIENT)
Dept: INTERNAL MEDICINE | Facility: CLINIC | Age: 86
End: 2021-01-28

## 2021-01-28 NOTE — TELEPHONE ENCOUNTER
"Patient calls,     Reporting abdominal pain that came on 5 days ago gradually, patient reports it occurs intermittently and lasts \"length of humming a song\" or several minutes. Pain is rated at a 3/10, patient thought she may be constipated but reports being able to have smaller BMs over the last few days, patient requesting to be scheduled appt in [facility] today or tomorrow, instructed patient if no openings next 2 days she needs to be evaluated at , patient transferred to scheduling, will go to  if nothing available    Additional Information    Negative: Chest pain    Negative: Pain is mainly in upper abdomen (if needed ask: 'is it mainly above the belly button?')    Negative: Abdominal pain and pregnant > 20 weeks    Negative: Abdominal pain and pregnant < 20 weeks    Negative: SEVERE abdominal pain (e.g., excruciating)    Negative: Vomiting red blood or black (coffee ground) material    Negative: Bloody, black, or tarry bowel movements    Negative: Constant abdominal pain lasting > 2 hours    Negative: Vomiting bile (green color)    Negative: Patient sounds very sick or weak to the triager    Negative: Vomiting and abdomen looks much more swollen than usual    Negative: White of the eyes have turned yellow (i.e., jaundice)    Negative: Blood in urine (red, pink, or tea-colored)    Negative: Fever > 103 F (39.4 C)    Negative: Fever > 101 F (38.3 C) and over 60 years of age    Negative: Fever > 100.0 F (37.8 C) and has diabetes mellitus or a weak immune system (e.g., HIV positive, cancer chemotherapy, organ transplant, splenectomy, chronic steroids)    Negative: Fever > 100.0 F (37.8 C) and bedridden (e.g., nursing home patient, stroke, chronic illness, recovering from surgery)    Negative: Pregnant or could be pregnant (i.e., missed last menstrual period)    Age > 60 years    Answer Assessment - Initial Assessment Questions  1. LOCATION: \"Where does it hurt?\"       Lower part of stomach near the belly " "button    2. RADIATION: \"Does the pain shoot anywhere else?\" (e.g., chest, back)      No    3. ONSET: \"When did the pain begin?\" (e.g., minutes, hours or days ago)       5 days ago    4. SUDDEN: \"Gradual or sudden onset?\"      Gradual    5. PATTERN \"Does the pain come and go, or is it constant?\"     - If intermittent: \"How long does it last?\" \"Do you have pain now?\"      (Note: Intermittent means the pain goes away completely between bouts)      Last several minutes, intermittently occurs    6. SEVERITY: \"How bad is the pain?\"  (e.g., Scale 1-10; mild, moderate, or severe)    - MILD (1-3): doesn't interfere with normal activities, abdomen soft and not tender to touch     7. RECURRENT SYMPTOM: \"Have you ever had this type of abdominal pain before?\" If so, ask: \"When was the last time?\" and \"What happened that time?\"       Years ago, dr said patient was eating too much fiber and canned foods    8. CAUSE: \"What do you think is causing the abdominal pain?\"      Unsure    9. RELIEVING/AGGRAVATING FACTORS: \"What makes it better or worse?\" (e.g., movement, antacids, bowel movement)      Takes tylenol at night for arthritis, nothing has helped with stomach pain    10. OTHER SYMPTOMS: \"Has there been any vomiting, diarrhea, constipation, or urine problems?\"        Mild bleeding when pushing to have stool, feels slightly constipated    11. PREGNANCY: \"Is there any chance you are pregnant?\" \"When was your last menstrual period?\"        Long time ago    Protocols used: ABDOMINAL PAIN - FEMALE-A-OH    Endy Oviedo RN   "

## 2021-02-28 ENCOUNTER — IMMUNIZATION (OUTPATIENT)
Dept: NURSING | Facility: CLINIC | Age: 86
End: 2021-02-28
Payer: MEDICARE

## 2021-02-28 PROCEDURE — 0011A PR COVID VAC MODERNA 100 MCG/0.5 ML IM: CPT

## 2021-02-28 PROCEDURE — 91301 PR COVID VAC MODERNA 100 MCG/0.5 ML IM: CPT

## 2021-03-21 ENCOUNTER — HEALTH MAINTENANCE LETTER (OUTPATIENT)
Age: 86
End: 2021-03-21

## 2021-03-26 ENCOUNTER — DOCUMENTATION ONLY (OUTPATIENT)
Dept: LAB | Facility: CLINIC | Age: 86
End: 2021-03-26

## 2021-03-26 DIAGNOSIS — E11.22 TYPE 2 DIABETES MELLITUS WITH STAGE 3B CHRONIC KIDNEY DISEASE, WITHOUT LONG-TERM CURRENT USE OF INSULIN (H): ICD-10-CM

## 2021-03-26 DIAGNOSIS — N18.32 TYPE 2 DIABETES MELLITUS WITH STAGE 3B CHRONIC KIDNEY DISEASE, WITHOUT LONG-TERM CURRENT USE OF INSULIN (H): ICD-10-CM

## 2021-03-26 DIAGNOSIS — N18.32 STAGE 3B CHRONIC KIDNEY DISEASE (H): ICD-10-CM

## 2021-03-26 DIAGNOSIS — N18.31 STAGE 3A CHRONIC KIDNEY DISEASE (H): Primary | ICD-10-CM

## 2021-03-26 NOTE — PROGRESS NOTES
"Pt has a lab appt Thursday 04/01/21. Appt notes state she is coming in for a \"normal lab conversation\". The only order in her chart is for a Covid vaccine. Please reach out to pt and discuss whether she needs to come in for labs or not. If not, please advise pt to cancel lab appt.  Thank you!  "

## 2021-03-26 NOTE — PROGRESS NOTES
Due for labs for f/u of CKD, diabetes, etc. Will be blood and urine lab and not  fasting. If pt overall doing well otherwise, will then follow-up with me in July for semi-annual recheck (last seen Jan 2021), If any acute issues going on, then assist with scheduling f/u appt with me earlier. Labs ordered

## 2021-03-26 NOTE — PROGRESS NOTES
"Patient has been informed-She states that she is \"doing as good as excepted in the current times\"   "

## 2021-03-28 ENCOUNTER — IMMUNIZATION (OUTPATIENT)
Dept: NURSING | Facility: CLINIC | Age: 86
End: 2021-03-28
Attending: INTERNAL MEDICINE
Payer: MEDICARE

## 2021-03-28 PROCEDURE — 91301 PR COVID VAC MODERNA 100 MCG/0.5 ML IM: CPT

## 2021-03-28 PROCEDURE — 0012A PR COVID VAC MODERNA 100 MCG/0.5 ML IM: CPT

## 2021-04-01 DIAGNOSIS — N18.32 TYPE 2 DIABETES MELLITUS WITH STAGE 3B CHRONIC KIDNEY DISEASE, WITHOUT LONG-TERM CURRENT USE OF INSULIN (H): ICD-10-CM

## 2021-04-01 DIAGNOSIS — N18.32 STAGE 3B CHRONIC KIDNEY DISEASE (H): ICD-10-CM

## 2021-04-01 DIAGNOSIS — E11.22 TYPE 2 DIABETES MELLITUS WITH STAGE 3B CHRONIC KIDNEY DISEASE, WITHOUT LONG-TERM CURRENT USE OF INSULIN (H): ICD-10-CM

## 2021-04-01 LAB
ANION GAP SERPL CALCULATED.3IONS-SCNC: 3 MMOL/L (ref 3–14)
BUN SERPL-MCNC: 25 MG/DL (ref 7–30)
CALCIUM SERPL-MCNC: 9.4 MG/DL (ref 8.5–10.1)
CHLORIDE SERPL-SCNC: 106 MMOL/L (ref 94–109)
CO2 SERPL-SCNC: 27 MMOL/L (ref 20–32)
CREAT SERPL-MCNC: 1.05 MG/DL (ref 0.52–1.04)
CREAT UR-MCNC: 241 MG/DL
DEPRECATED CALCIDIOL+CALCIFEROL SERPL-MC: 54 UG/L (ref 20–75)
GFR SERPL CREATININE-BSD FRML MDRD: 48 ML/MIN/{1.73_M2}
GLUCOSE SERPL-MCNC: 97 MG/DL (ref 70–99)
HBA1C MFR BLD: 5.6 % (ref 0–5.6)
MICROALBUMIN UR-MCNC: 44 MG/L
MICROALBUMIN/CREAT UR: 18.13 MG/G CR (ref 0–25)
POTASSIUM SERPL-SCNC: 4.6 MMOL/L (ref 3.4–5.3)
SODIUM SERPL-SCNC: 136 MMOL/L (ref 133–144)

## 2021-04-01 PROCEDURE — 36415 COLL VENOUS BLD VENIPUNCTURE: CPT | Performed by: INTERNAL MEDICINE

## 2021-04-01 PROCEDURE — 83036 HEMOGLOBIN GLYCOSYLATED A1C: CPT | Performed by: INTERNAL MEDICINE

## 2021-04-01 PROCEDURE — 82306 VITAMIN D 25 HYDROXY: CPT | Performed by: INTERNAL MEDICINE

## 2021-04-01 PROCEDURE — 82043 UR ALBUMIN QUANTITATIVE: CPT | Performed by: INTERNAL MEDICINE

## 2021-04-01 PROCEDURE — 80048 BASIC METABOLIC PNL TOTAL CA: CPT | Performed by: INTERNAL MEDICINE

## 2021-05-15 DIAGNOSIS — K21.9 GASTROESOPHAGEAL REFLUX DISEASE WITHOUT ESOPHAGITIS: ICD-10-CM

## 2021-08-05 DIAGNOSIS — I10 ESSENTIAL HYPERTENSION, BENIGN: ICD-10-CM

## 2021-08-05 RX ORDER — AMLODIPINE BESYLATE 10 MG/1
TABLET ORAL
Qty: 90 TABLET | Refills: 1 | Status: SHIPPED | OUTPATIENT
Start: 2021-08-05 | End: 2022-02-07

## 2021-08-05 NOTE — TELEPHONE ENCOUNTER
Last seen in January 2021.  Blood pressure well controlled at that time.  Medication refilled for 6 months

## 2021-08-05 NOTE — TELEPHONE ENCOUNTER
Routing refill request to provider for review/approval because:  Labs out of range:    Creatinine   Date Value Ref Range Status   04/01/2021 1.05 (H) 0.52 - 1.04 mg/dL Final         Maulik Eaton RN  [facility] Triage Nurse

## 2021-08-11 ENCOUNTER — MYC MEDICAL ADVICE (OUTPATIENT)
Dept: INTERNAL MEDICINE | Facility: CLINIC | Age: 86
End: 2021-08-11

## 2021-08-12 ENCOUNTER — OFFICE VISIT (OUTPATIENT)
Dept: INTERNAL MEDICINE | Facility: CLINIC | Age: 86
End: 2021-08-12
Payer: MEDICARE

## 2021-08-12 DIAGNOSIS — I10 ESSENTIAL HYPERTENSION, BENIGN: ICD-10-CM

## 2021-08-12 DIAGNOSIS — N18.32 STAGE 3B CHRONIC KIDNEY DISEASE (H): ICD-10-CM

## 2021-08-12 DIAGNOSIS — N18.31 TYPE 2 DIABETES MELLITUS WITH STAGE 3A CHRONIC KIDNEY DISEASE, WITHOUT LONG-TERM CURRENT USE OF INSULIN (H): ICD-10-CM

## 2021-08-12 DIAGNOSIS — E11.22 TYPE 2 DIABETES MELLITUS WITH STAGE 3A CHRONIC KIDNEY DISEASE, WITHOUT LONG-TERM CURRENT USE OF INSULIN (H): ICD-10-CM

## 2021-08-12 DIAGNOSIS — E78.5 HYPERLIPIDEMIA LDL GOAL <100: ICD-10-CM

## 2021-08-12 DIAGNOSIS — F32.0 MILD MAJOR DEPRESSION (H): ICD-10-CM

## 2021-08-12 DIAGNOSIS — F41.8 SITUATIONAL ANXIETY: ICD-10-CM

## 2021-08-12 PROCEDURE — 99214 OFFICE O/P EST MOD 30 MIN: CPT | Performed by: INTERNAL MEDICINE

## 2021-08-12 RX ORDER — BUSPIRONE HYDROCHLORIDE 15 MG/1
TABLET ORAL
Qty: 30 TABLET | Refills: 11
Start: 2021-08-12 | End: 2021-09-30

## 2021-08-12 RX ORDER — ESCITALOPRAM OXALATE 5 MG/1
5 TABLET ORAL DAILY
Qty: 30 TABLET | Refills: 11 | Status: SHIPPED | OUTPATIENT
Start: 2021-08-12 | End: 2021-08-29 | Stop reason: DRUGHIGH

## 2021-08-12 ASSESSMENT — ANXIETY QUESTIONNAIRES
GAD7 TOTAL SCORE: 10
4. TROUBLE RELAXING: NEARLY EVERY DAY
3. WORRYING TOO MUCH ABOUT DIFFERENT THINGS: NOT AT ALL
5. BEING SO RESTLESS THAT IT IS HARD TO SIT STILL: SEVERAL DAYS
GAD7 TOTAL SCORE: 10
1. FEELING NERVOUS, ANXIOUS, OR ON EDGE: NEARLY EVERY DAY
7. FEELING AFRAID AS IF SOMETHING AWFUL MIGHT HAPPEN: NOT AT ALL
8. IF YOU CHECKED OFF ANY PROBLEMS, HOW DIFFICULT HAVE THESE MADE IT FOR YOU TO DO YOUR WORK, TAKE CARE OF THINGS AT HOME, OR GET ALONG WITH OTHER PEOPLE?: EXTREMELY DIFFICULT
6. BECOMING EASILY ANNOYED OR IRRITABLE: NEARLY EVERY DAY
2. NOT BEING ABLE TO STOP OR CONTROL WORRYING: NOT AT ALL
GAD7 TOTAL SCORE: 10
7. FEELING AFRAID AS IF SOMETHING AWFUL MIGHT HAPPEN: NOT AT ALL

## 2021-08-12 ASSESSMENT — PATIENT HEALTH QUESTIONNAIRE - PHQ9
10. IF YOU CHECKED OFF ANY PROBLEMS, HOW DIFFICULT HAVE THESE PROBLEMS MADE IT FOR YOU TO DO YOUR WORK, TAKE CARE OF THINGS AT HOME, OR GET ALONG WITH OTHER PEOPLE: VERY DIFFICULT
SUM OF ALL RESPONSES TO PHQ QUESTIONS 1-9: 14
SUM OF ALL RESPONSES TO PHQ QUESTIONS 1-9: 14

## 2021-08-12 NOTE — TELEPHONE ENCOUNTER
Need to know more information before choosing medication vs other. Daughter's tele number not in chart. [employer] message sent back to daughter to  see if she can either bring pt to clinic today for appt in person at 2:00 today or, if not, if can do telephone visit at that time with pt to talk more and will then make decision re: appropriate treatment. Will await response back from daughter. Daughter will call clinic

## 2021-08-12 NOTE — TELEPHONE ENCOUNTER
Left message for daughter to call back and let us know if appt in clinic or telephone at 2:00 will work.

## 2021-08-12 NOTE — PROGRESS NOTES
ASSESSMENT:   1. Mild major depression (H)  Uncontrolled.  We will start low-dose Lexapro.  Patient referred for counseling in addition.  Does not have virtual video access but will be willing to do counseling via telephone.  Patient hesitant to do in clinic face-to-face counseling because of need for assistance with getting to appointments  - escitalopram (LEXAPRO) 5 MG tablet; Take 1 tablet (5 mg) by mouth daily  Dispense: 30 tablet; Refill: 11  - MENTAL HEALTH REFERRAL  - Adult; Outpatient Treatment; Individual/Couples/Family/Group Therapy/Health Psychology; [facility] [phone]; We will contact you to schedule the appointment or please call with any questions; Future    2. Situational anxiety  Needs improved control.  Continue current buspirone dose for now and add Lexapro.  Possible room for additional buspirone dose increase in the future.  Mental health counseling referral in addition  - busPIRone (BUSPAR) 15 MG tablet; 1/2 tab tab twice a day for anxiety  Dispense: 30 tablet; Refill: 11  - escitalopram (LEXAPRO) 5 MG tablet; Take 1 tablet (5 mg) by mouth daily  Dispense: 30 tablet; Refill: 11  - MENTAL HEALTH REFERRAL  - Adult; Outpatient Treatment; Individual/Couples/Family/Group Therapy/Health Psychology; [facility] [phone]; We will contact you to schedule the appointment or please call with any questions; Future      3. Essential hypertension, benign  Controlled.  Continue current medication  - Comprehensive metabolic panel; Future  - losartan (COZAAR) 50 MG tablet; Take 1 tablet (50 mg) by mouth daily  Dispense: 90 tablet; Refill: 3    4. Stage 3b chronic kidney disease  History of CKD.  Due for follow-up fasting labs in October.  Previously stable  - Comprehensive metabolic panel; Future  - Lipid panel reflex to direct LDL Fasting; Future  - Phosphorus; Future  - Vitamin D Deficiency; Future  - Parathyroid Hormone Intact; Future  - Hemoglobin; Future    5.  Hyperlipidemia LDL goal <100  On statin therapy.  Due for follow-up labs in October.  Continue current medication  - Comprehensive metabolic panel; Future  - Lipid panel reflex to direct LDL Fasting; Future  - atorvastatin (LIPITOR) 20 MG tablet; Take 1 tablet (20 mg) by mouth daily  Dispense: 90 tablet; Refill: 3     7. Type 2 diabetes mellitus with stage 3a chronic kidney disease, without long-term current use of insulin (H)  Diet controlled.  Repeat A1c spring 2022  - Hemoglobin A1c; Future    PLAN:  Escitalopram  5mg tab, 1/2 tab  daily with food in AM for 2 days. Then increase to 1 tab daily in AM for mental health  Update me in [employer] in 2 weeks re: status  Referral to  Counseling service. They will call to schedule  Call  [phone] or use [employer] to schedule a future lab appointment  fasting in October 2021   For fasting labs, please refrain from eating for 8 hours or more.   Drink 2 glasses of water before your lab appointment. It is fine to take your  oral medications on the morning of the lab test as usual  Continue other medications  Strongly encourage you to get outside at home future standing meals with your daughter or chair yoga class as scheduled        (Chart documentation was completed, in part, with [employer] voice-recognition software. Even though reviewed, some grammatical, spelling, and word errors may remain.)    Frank Kovacs MD  Internal Medicine Department  [facility]BEAR Gonzales is a 87 year old who presents for the following health issues     HPI     Depression Followup    How are you doing with your depression since your last visit? Worsened     Are you having other symptoms that might be associated with depression? No    Have you had a significant life event?  Relationship Concerns and Housing Concerns     Are you feeling anxious or having panic attacks?   No    Do you have any concerns with your use of alcohol or other drugs?  No    Social History     Tobacco Use     Smoking status: Former Smoker     Quit date: 10/10/1981     Years since quittin.8     Smokeless tobacco: Never Used   Substance Use Topics     Alcohol use: Yes     Alcohol/week: 7.0 - 14.0 standard drinks     Types: 7 - 14 Glasses of wine per week     Comment: 1-2 glass of wine daily     Drug use: No     PHQ 2020   PHQ-9 Total Score 4 11 14   Q9: Thoughts of better off dead/self-harm past 2 weeks Not at all Not at all Not at all     MARIAN-7 SCORE 2020   Total Score - - 10 (moderate anxiety)   Total Score 2 12 10     Last PHQ-9 2021   1.  Little interest or pleasure in doing things 3   2.  Feeling down, depressed, or hopeless 2   3.  Trouble falling or staying asleep, or sleeping too much 3   4.  Feeling tired or having little energy 3   5.  Poor appetite or overeating 0   6.  Feeling bad about yourself 2   7.  Trouble concentrating 1   8.  Moving slowly or restless 0   Q9: Thoughts of better off dead/self-harm past 2 weeks 0   PHQ-9 Total Score 14   Difficulty at work, home, or with people -     MARIAN-7  2021   1. Feeling nervous, anxious, or on edge 3   2. Not being able to stop or control worrying 0   3. Worrying too much about different things 0   4. Trouble relaxing 3   5. Being so restless that it is hard to sit still 1   6. Becoming easily annoyed or irritable 3   7. Feeling afraid, as if something awful might happen 0   MARIAN-7 Total Score 10   If you checked any problems, how difficult have they made it for you to do your work, take care of things at home, or get along with other people? -      Most recent lab results reviewed with pt.       Note from daughter earlier today in [employer]:     I am Moraima Pemberton, the eldest daughter of Janet Cope and her  Bereket.  I made an appointment through [employer] for my mother to see you on .  She has been experiencing extreme depression, rage  and  "hopelessness.  I am afraid she is getting worse and I fear waiting until the 23rd before getting her some help.     I am writing to ask you to consider starting her now on an anti-depressant.  We could follow up with you at her scheduled appt.  I understand I can take her to urgent care, but at 87 years old I hate to put her through this.       Patient seen with her daughter today.  Patient states he is increasingly frustrated with her .  He expects her to be taking meals and helping clean up after him.  Also likes to basically stay at home.  Patient feeling more isolated at home because of the Covid pandemic.  Patient is vaccinated.  Daughter has signed herself and patient up for a chair yoga class but patient somewhat hesitant to do this she feels this is a burden to her daughter despite daughter wanting patient to do it together.  Patient feeling more depressed.  States she does not wish to go out shopping as she does not need to have any new) she is artery taking.  Patient is like playing bridge but because of vision issues with macular degeneration, she is not able to see quite as well and so is slower and playing which she thinks bothers the other players and so she has not been playing bridge recently either.   is not interested in any type of therapy/counseling.  Denies chest pain, shortness of breath, abdominal pain, headache  or side effects with medications.  Taking buspirone half tablet twice a day for anxiety         Additional ROS:   Constitutional, HEENT, Cardiovascular, Pulmonary, GI and , Neuro, MSK and Psych review of systems/symptoms are otherwise negative or unchanged from previous, except as noted above.      OBJECTIVE:  /72   Pulse 88   Temp 97.8  F (36.6  C) (Temporal)   Resp 16   Wt 73.5 kg (162 lb)   SpO2 98%   BMI 28.70 kg/m     Estimated body mass index is 28.7 kg/m  as calculated from the following:    Height as of 5/23/19: 1.6 m (5' 3\").    Weight as of this " encounter: 73.5 kg (162 lb).     Neck: no adenopathy. Thyroid normal to palpation. No bruits  Pulm: Lungs clear to auscultation   CV: Regular rates and rhythm  GI: Soft, nontender, Normal active bowel sounds, No hepatosplenomegaly or masses palpable  Ext: Peripheral pulses intact. No edema.   Gen: Occasionally tearful talking about things.  Slight flattened affect.  Normal dress, thought content and eye contact

## 2021-08-12 NOTE — TELEPHONE ENCOUNTER
Please see mychart from patient and advise appropriate course of action.      Maulik Eaton RN  [facility] Triage Nurse

## 2021-08-12 NOTE — PATIENT INSTRUCTIONS
Escitalopram  5mg tab, 1/2 tab  daily with food in AM for 2 days. Then increase to 1 tab daily in AM for mental health  Update me in Mychart in 2 weeks re: status  Referral to  Counseling service. They will call to schedule  Call  [phone] or use [employer] to schedule a future lab appointment  fasting in October 2021   For fasting labs, please refrain from eating for 8 hours or more.   Drink 2 glasses of water before your lab appointment. It is fine to take your  oral medications on the morning of the lab test as usual  Continue other medications  Strongly encourage you to get outside at home future standing meals with your daughter or chair yoga class as scheduled

## 2021-08-13 ASSESSMENT — PATIENT HEALTH QUESTIONNAIRE - PHQ9: SUM OF ALL RESPONSES TO PHQ QUESTIONS 1-9: 14

## 2021-08-13 ASSESSMENT — ANXIETY QUESTIONNAIRES: GAD7 TOTAL SCORE: 10

## 2021-08-15 VITALS
DIASTOLIC BLOOD PRESSURE: 72 MMHG | BODY MASS INDEX: 28.7 KG/M2 | TEMPERATURE: 97.8 F | OXYGEN SATURATION: 98 % | HEART RATE: 88 BPM | WEIGHT: 162 LBS | SYSTOLIC BLOOD PRESSURE: 120 MMHG | RESPIRATION RATE: 16 BRPM

## 2021-08-15 RX ORDER — ATORVASTATIN CALCIUM 20 MG/1
20 TABLET, FILM COATED ORAL DAILY
Qty: 90 TABLET | Refills: 3 | Status: SHIPPED | OUTPATIENT
Start: 2021-08-15 | End: 2022-06-04

## 2021-08-15 RX ORDER — LOSARTAN POTASSIUM 50 MG/1
50 TABLET ORAL DAILY
Qty: 90 TABLET | Refills: 3 | Status: SHIPPED | OUTPATIENT
Start: 2021-08-15 | End: 2022-06-04

## 2021-08-23 ENCOUNTER — TELEPHONE (OUTPATIENT)
Dept: INTERNAL MEDICINE | Facility: CLINIC | Age: 86
End: 2021-08-23

## 2021-08-23 NOTE — TELEPHONE ENCOUNTER
Patient Quality Outreach      Summary:    Patient has the following on her problem list/HM:     Depression / Dysthymia review    6 Month Remission: 4-8 month window range: 0  12 Month Remission: 10-14 month window range: 0       PHQ-9 SCORE 2020   PHQ-9 Total Score - - -   PHQ-9 Total Score MyChart - - 14 (Moderate depression)   PHQ-9 Total Score 4 11 14       If PHQ-9 recheck is 5 or more, route to provider for next steps.    Diabetes    Last A1C:  Lab Results   Component Value Date    A1C 5.6 2021    A1C 5.6 2020       Last LDL:    Lab Results   Component Value Date    LDL 83 10/19/2020       Is the patient on a Statin? Yes          Is the patient on Aspirin? Yes    Medications     HMG CoA Reductase Inhibitors     atorvastatin (LIPITOR) 20 MG tablet       Salicylates     ASPIRIN 81 MG OR TABS             Last three blood pressure readings:  BP Readings from Last 3 Encounters:   21 120/72   21 124/80   20 (!) 146/78            Tobacco Use      Smoking status: Former Smoker        Quit date: 10/10/1981        Years since quittin.8      Smokeless tobacco: Never Used        IVD     ASA: Passed    Last LDL:    Lab Results   Component Value Date    CHOL 186 10/19/2020     Lab Results   Component Value Date    HDL 85 10/19/2020     Lab Results   Component Value Date    LDL 83 10/19/2020     Lab Results   Component Value Date    TRIG 92 10/19/2020        Lab Results   Component Value Date    CHOLHDLRATIO 3.2 2015        Is the patient on a Statin? Yes   Is the patient on Aspirin? Yes                  Medications     HMG CoA Reductase Inhibitors     atorvastatin (LIPITOR) 20 MG tablet       Salicylates     ASPIRIN 81 MG OR TABS             Last three blood pressure readings:  BP Readings from Last 3 Encounters:   21 120/72   21 124/80   20 (!) 146/78        Tobacco History:       Tobacco Use      Smoking status: Former Smoker        Quit  date: 10/10/1981        Years since quittin.8      Smokeless tobacco: Never Used      Patient is due/failing the following:   Eye Exam and Annual wellness, date due: 2020    Type of outreach:    Sent [employer] message.    Questions for provider review:    None                                                                                                                                     Rita Montoya CMA       Chart routed to Care Team.

## 2021-08-29 ENCOUNTER — MYC MEDICAL ADVICE (OUTPATIENT)
Dept: INTERNAL MEDICINE | Facility: CLINIC | Age: 86
End: 2021-08-29

## 2021-08-29 DIAGNOSIS — F32.0 MILD MAJOR DEPRESSION (H): Primary | ICD-10-CM

## 2021-08-29 RX ORDER — ESCITALOPRAM OXALATE 10 MG/1
10 TABLET ORAL DAILY
Qty: 30 TABLET | Refills: 11 | Status: SHIPPED | OUTPATIENT
Start: 2021-08-29 | End: 2021-09-30

## 2021-09-04 ENCOUNTER — HEALTH MAINTENANCE LETTER (OUTPATIENT)
Age: 86
End: 2021-09-04

## 2021-09-25 ENCOUNTER — LAB (OUTPATIENT)
Dept: LAB | Facility: CLINIC | Age: 86
End: 2021-09-25
Payer: MEDICARE

## 2021-09-25 DIAGNOSIS — N18.32 STAGE 3B CHRONIC KIDNEY DISEASE (H): ICD-10-CM

## 2021-09-25 DIAGNOSIS — E78.5 HYPERLIPIDEMIA LDL GOAL <100: ICD-10-CM

## 2021-09-25 DIAGNOSIS — E11.22 TYPE 2 DIABETES MELLITUS WITH STAGE 3A CHRONIC KIDNEY DISEASE, WITHOUT LONG-TERM CURRENT USE OF INSULIN (H): ICD-10-CM

## 2021-09-25 DIAGNOSIS — I10 ESSENTIAL HYPERTENSION, BENIGN: ICD-10-CM

## 2021-09-25 DIAGNOSIS — N18.31 TYPE 2 DIABETES MELLITUS WITH STAGE 3A CHRONIC KIDNEY DISEASE, WITHOUT LONG-TERM CURRENT USE OF INSULIN (H): ICD-10-CM

## 2021-09-25 LAB
ALBUMIN SERPL-MCNC: 4.1 G/DL (ref 3.4–5)
ALP SERPL-CCNC: 88 U/L (ref 40–150)
ALT SERPL W P-5'-P-CCNC: 27 U/L (ref 0–50)
ANION GAP SERPL CALCULATED.3IONS-SCNC: 7 MMOL/L (ref 3–14)
AST SERPL W P-5'-P-CCNC: 29 U/L (ref 0–45)
BILIRUB SERPL-MCNC: 0.7 MG/DL (ref 0.2–1.3)
BUN SERPL-MCNC: 21 MG/DL (ref 7–30)
CALCIUM SERPL-MCNC: 9.5 MG/DL (ref 8.5–10.1)
CHLORIDE BLD-SCNC: 105 MMOL/L (ref 94–109)
CHOLEST SERPL-MCNC: 177 MG/DL
CO2 SERPL-SCNC: 25 MMOL/L (ref 20–32)
CREAT SERPL-MCNC: 1.01 MG/DL (ref 0.52–1.04)
FASTING STATUS PATIENT QL REPORTED: YES
GFR SERPL CREATININE-BSD FRML MDRD: 50 ML/MIN/1.73M2
GLUCOSE BLD-MCNC: 105 MG/DL (ref 70–99)
HBA1C MFR BLD: 5.4 % (ref 0–5.6)
HDLC SERPL-MCNC: 88 MG/DL
HGB BLD-MCNC: 13.1 G/DL (ref 11.7–15.7)
LDLC SERPL CALC-MCNC: 76 MG/DL
NONHDLC SERPL-MCNC: 89 MG/DL
PHOSPHATE SERPL-MCNC: 3.9 MG/DL (ref 2.5–4.5)
POTASSIUM BLD-SCNC: 3.9 MMOL/L (ref 3.4–5.3)
PROT SERPL-MCNC: 8.6 G/DL (ref 6.8–8.8)
PTH-INTACT SERPL-MCNC: 56 PG/ML (ref 18–80)
SODIUM SERPL-SCNC: 137 MMOL/L (ref 133–144)
TRIGL SERPL-MCNC: 64 MG/DL

## 2021-09-25 PROCEDURE — 80053 COMPREHEN METABOLIC PANEL: CPT

## 2021-09-25 PROCEDURE — 80061 LIPID PANEL: CPT

## 2021-09-25 PROCEDURE — 36415 COLL VENOUS BLD VENIPUNCTURE: CPT

## 2021-09-25 PROCEDURE — 82306 VITAMIN D 25 HYDROXY: CPT

## 2021-09-25 PROCEDURE — 83036 HEMOGLOBIN GLYCOSYLATED A1C: CPT

## 2021-09-25 PROCEDURE — 84100 ASSAY OF PHOSPHORUS: CPT

## 2021-09-25 PROCEDURE — 83970 ASSAY OF PARATHORMONE: CPT

## 2021-09-25 PROCEDURE — 85018 HEMOGLOBIN: CPT

## 2021-09-26 NOTE — RESULT ENCOUNTER NOTE
Results reviewed. Will discuss them soon with pt at upcoming scheduled appt 9/30/2021.  Vitamin D level still pending.. See clinic note from that visit for future plan

## 2021-09-27 LAB — DEPRECATED CALCIDIOL+CALCIFEROL SERPL-MC: 60 UG/L (ref 20–75)

## 2021-09-30 ENCOUNTER — VIRTUAL VISIT (OUTPATIENT)
Dept: INTERNAL MEDICINE | Facility: CLINIC | Age: 86
End: 2021-09-30
Payer: MEDICARE

## 2021-09-30 DIAGNOSIS — E11.22 TYPE 2 DIABETES MELLITUS WITH STAGE 3A CHRONIC KIDNEY DISEASE, WITHOUT LONG-TERM CURRENT USE OF INSULIN (H): ICD-10-CM

## 2021-09-30 DIAGNOSIS — N18.31 STAGE 3A CHRONIC KIDNEY DISEASE (H): ICD-10-CM

## 2021-09-30 DIAGNOSIS — N18.31 TYPE 2 DIABETES MELLITUS WITH STAGE 3A CHRONIC KIDNEY DISEASE, WITHOUT LONG-TERM CURRENT USE OF INSULIN (H): ICD-10-CM

## 2021-09-30 DIAGNOSIS — F41.8 SITUATIONAL ANXIETY: ICD-10-CM

## 2021-09-30 DIAGNOSIS — F32.0 MILD MAJOR DEPRESSION (H): ICD-10-CM

## 2021-09-30 PROCEDURE — 99442 PR PHYSICIAN TELEPHONE EVALUATION 11-20 MIN: CPT | Mod: 95 | Performed by: INTERNAL MEDICINE

## 2021-09-30 RX ORDER — BUSPIRONE HYDROCHLORIDE 15 MG/1
TABLET ORAL
Qty: 90 TABLET | Refills: 3 | Status: SHIPPED | OUTPATIENT
Start: 2021-09-30 | End: 2022-02-15

## 2021-09-30 RX ORDER — ESCITALOPRAM OXALATE 10 MG/1
10 TABLET ORAL DAILY
Qty: 90 TABLET | Refills: 3 | Status: SHIPPED | OUTPATIENT
Start: 2021-09-30 | End: 2022-06-04

## 2021-09-30 ASSESSMENT — ANXIETY QUESTIONNAIRES
2. NOT BEING ABLE TO STOP OR CONTROL WORRYING: NOT AT ALL
5. BEING SO RESTLESS THAT IT IS HARD TO SIT STILL: NOT AT ALL
7. FEELING AFRAID AS IF SOMETHING AWFUL MIGHT HAPPEN: NOT AT ALL
1. FEELING NERVOUS, ANXIOUS, OR ON EDGE: NOT AT ALL
GAD7 TOTAL SCORE: 0
6. BECOMING EASILY ANNOYED OR IRRITABLE: NOT AT ALL
3. WORRYING TOO MUCH ABOUT DIFFERENT THINGS: NOT AT ALL

## 2021-09-30 ASSESSMENT — PATIENT HEALTH QUESTIONNAIRE - PHQ9
SUM OF ALL RESPONSES TO PHQ QUESTIONS 1-9: 1
5. POOR APPETITE OR OVEREATING: NOT AT ALL

## 2021-09-30 NOTE — PATIENT INSTRUCTIONS
Continue current meds  Prescriptions refilled.    Call  [phone] or use [email or web address] to schedule a future lab appointment  non-fasting in 6 months.   Schedule a follow up appointment with me in clinic a few days after these future labs are drawn to review results and other medical issues as necessary   Will await further recommendations from CDC/FDA   re: possible future covid vaccination booster for Moderna vaccine

## 2021-09-30 NOTE — PROGRESS NOTES
Janet is a 87 year old who is being evaluated via a billable telephone visit.      What phone number would you like to be contacted at? [phone]  How would you like to obtain your AVS? [employer]    ASSESSMENT:    1. Mild major depression (H)   PHQ = 1 now.  Significantly improved.  Continue current medication  - escitalopram (LEXAPRO) 10 MG tablet; Take 1 tablet (10 mg) by mouth daily  Dispense: 90 tablet; Refill: 3    2. Situational anxiety   MARIAN = 0 now.  Clinically improved.  Continue current medication  - busPIRone (BUSPAR) 15 MG tablet; 1/2 tab tab twice a day for anxiety  Dispense: 90 tablet; Refill: 3  - escitalopram (LEXAPRO) 10 MG tablet; Take 1 tablet (10 mg) by mouth daily  Dispense: 90 tablet; Refill: 3    3. Stage 3a chronic kidney disease (H)  Improved.  Recheck labs in 6 months    4. Type 2 diabetes mellitus with stage 3a chronic kidney disease, without long-term current use of insulin (H)  Well-controlled with diet.  Repeat lab 6 months      PLAN:  Continue current meds  Prescriptions refilled.    Call  [phone] or use [email or web address] to schedule a future lab appointment  non-fasting in 6 months.   Schedule a follow up appointment with me in clinic a few days after these future labs are drawn to review results and other medical issues as necessary   Will await further recommendations from CDC/FDA   re: possible future covid vaccination booster for Moderna vaccine  Future lab orders placed per health care maintenance lab order protocol        Phone call contact time  Call Started at 10:30am  Call Ended at 10:42 am  Total minutes: 12 min    (Chart documentation was completed, in part, with [email or web address] voice-recognition software. Even though reviewed, some grammatical, spelling, and word errors may remain.)    Frank Kovacs MD  Internal Medicine Department  [facility]           Mariah Gonzales is a 87 year old who presents for the following health issues     HPI      Diabetes Follow-up    How often are you checking your blood sugar? A few times a week  What time of day are you checking your blood sugars (select all that apply)?  Varies  Have you had any blood sugars above 200?  No  Have you had any blood sugars below 70?  No    What symptoms do you notice when your blood sugar is low?  Dizzy    What concerns do you have today about your diabetes? None     Do you have any of these symptoms? (Select all that apply)  No numbness or tingling in feet.  No redness, sores or blisters on feet.  No complaints of excessive thirst.  No reports of blurry vision.  No significant changes to weight.    Have you had a diabetic eye exam in the last 12 months? No            Hyperlipidemia Follow-Up      Are you regularly taking any medication or supplement to lower your cholesterol?   Yes- Atorvastatin    Are you having muscle aches or other side effects that you think could be caused by your cholesterol lowering medication?  No    Hypertension Follow-up      Do you check your blood pressure regularly outside of the clinic? No     Are you following a low salt diet? Yes    Are your blood pressures ever more than 140 on the top number (systolic) OR more   than 90 on the bottom number (diastolic), for example 140/90? N/a     BP Readings from Last 2 Encounters:   08/12/21 120/72   01/04/21 124/80     Hemoglobin A1C (%)   Date Value   09/25/2021 5.4   04/01/2021 5.6   01/23/2020 5.6     LDL Cholesterol Calculated (mg/dL)   Date Value   09/25/2021 76   10/19/2020 83   10/08/2019 88       Depression and Anxiety Follow-Up    How are you doing with your depression since your last visit? Improved     How are you doing with your anxiety since your last visit?  Improved     Are you having other symptoms that might be associated with depression or anxiety? No    Have you had a significant life event? No     Do you have any concerns with your use of alcohol or other drugs? No    Social History     Tobacco  Use     Smoking status: Former Smoker     Quit date: 10/10/1981     Years since quittin.0     Smokeless tobacco: Never Used   Substance Use Topics     Alcohol use: Yes     Alcohol/week: 7.0 - 14.0 standard drinks     Types: 7 - 14 Glasses of wine per week     Comment: 1-2 glass of wine daily     Drug use: No     PHQ 2021   PHQ-9 Total Score 11 14 1   Q9: Thoughts of better off dead/self-harm past 2 weeks Not at all Not at all Not at all     MARIAN-7 SCORE 2021   Total Score - 10 (moderate anxiety) -   Total Score 12 10 0     Last PHQ-9 2021   1.  Little interest or pleasure in doing things 0   2.  Feeling down, depressed, or hopeless 0   3.  Trouble falling or staying asleep, or sleeping too much 0   4.  Feeling tired or having little energy 1   5.  Poor appetite or overeating 0   6.  Feeling bad about yourself 0   7.  Trouble concentrating 0   8.  Moving slowly or restless 0   Q9: Thoughts of better off dead/self-harm past 2 weeks 0   PHQ-9 Total Score 1   Difficulty at work, home, or with people Not difficult at all     MARIAN-7  2021   1. Feeling nervous, anxious, or on edge 0   2. Not being able to stop or control worrying 0   3. Worrying too much about different things 0   4. Trouble relaxing 0   5. Being so restless that it is hard to sit still 0   6. Becoming easily annoyed or irritable 0   7. Feeling afraid, as if something awful might happen 0   MARIAN-7 Total Score 0   If you checked any problems, how difficult have they made it for you to do your work, take care of things at home, or get along with other people? -        Most recent lab results reviewed with pt.       Doing yoga once a week with daughter   Visiting with granddaughter often  Walking dod 3x/day   Says not as short with people and tends not to say things she shouldn't now   Mood much better  Denies CP, SOB, abdominal pain, polyuria, polydipsia, vision changes, extremity  numbness/parasthesias or skin problems.       Component      Latest Ref Rng & Units 10/19/2020 4/1/2021 9/25/2021   Sodium      133 - 144 mmol/L 138 136 137   Potassium      3.4 - 5.3 mmol/L 3.9 4.6 3.9   Chloride      94 - 109 mmol/L 107 106 105   Carbon Dioxide      20 - 32 mmol/L 26 27 25   Anion Gap      3 - 14 mmol/L 5 3 7   Glucose      70 - 99 mg/dL 112 (H) 97 105 (H)   Urea Nitrogen      7 - 30 mg/dL 25 25 21   Creatinine      0.52 - 1.04 mg/dL 1.13 (H) 1.05 (H) 1.01   GFR Estimate      >60 mL/min/1.73m2 44 (L) 48 (L) 50 (L)   GFR Estimate If Black      >60 mL/min/1.73:m2 51 (L) 55 (L)    Calcium      8.5 - 10.1 mg/dL 9.6 9.4 9.5   Bilirubin Total      0.2 - 1.3 mg/dL 0.7  0.7   Albumin      3.4 - 5.0 g/dL 3.7  4.1   Protein Total      6.8 - 8.8 g/dL 8.2  8.6   Alkaline Phosphatase      40 - 150 U/L 73     ALT      0 - 50 U/L 23  27   AST      0 - 45 U/L 18  29   Alkaline Phosphatase      40 - 150 U/L   88   Cholesterol      <200 mg/dL   177   Triglycerides      <150 mg/dL   64   HDL Cholesterol      >=50 mg/dL   88   LDL Cholesterol Calculated      <=100 mg/dL   76   Non HDL Cholesterol      <130 mg/dL   89   Patient Fasting > 8hrs?         Yes   Hemoglobin A1C      0.0 - 5.6 %  5.6 5.4   Phosphorus      2.5 - 4.5 mg/dL   3.9   Vitamin D Deficiency screening      20 - 75 ug/L   60   Parathyroid Hormone Intact      18 - 80 pg/mL   56   Hemoglobin      11.7 - 15.7 g/dL   13.1       Additional ROS:   Constitutional, HEENT, Cardiovascular, Pulmonary, GI and , Neuro, MSK and Psych review of systems/symptoms are otherwise negative or unchanged from previous, except as noted above.           Objective:  Any reported vitals from today were reviewed in chart. See nursing documentation for details    RESP: No cough, no audible wheezing, able to talk in full sentences  GEN: Normal affect. Normal though content/speech  Remainder of exam unable to be completed due to telephone visits

## 2021-10-01 ASSESSMENT — ANXIETY QUESTIONNAIRES: GAD7 TOTAL SCORE: 0

## 2021-10-19 PROBLEM — F32.9 MAJOR DEPRESSION: Status: ACTIVE | Noted: 2018-04-25

## 2021-11-19 ENCOUNTER — NURSE TRIAGE (OUTPATIENT)
Dept: INTERNAL MEDICINE | Facility: CLINIC | Age: 86
End: 2021-11-19
Payer: MEDICARE

## 2021-11-19 ENCOUNTER — APPOINTMENT (OUTPATIENT)
Dept: URGENT CARE | Facility: CLINIC | Age: 86
End: 2021-11-19
Payer: MEDICARE

## 2021-11-19 NOTE — TELEPHONE ENCOUNTER
No hx heart issues in past. Descripton  From triage makes it sound more like pt has sx  When first lying down rather than after sleeping for period of time so not sounding like true orthopnea, PND. Good exercise tolerance with yoga and no issues with naps per note.  No prior dx of DANNY. No mention of edema and has not had in past. NO mention of other infectious issues. ? arrhythmia (though pt denying palpitations), post nasal drainage issue, GERD, etc. Agree with appt next week with Rita. Agree with propping up some with sleep if needed.

## 2021-11-19 NOTE — TELEPHONE ENCOUNTER
"Pt's oldest daughter, Lizzy, is calling to report Orthopnea symptoms.  Starting about 1 week ago, pt will have these episodes at night before bed, When pt goes to lay down and sleep at night, she will have a \"difficult time getting enough air in her lungs.\"   She will Get up and walk around a little, do some deep breathing, and eventually the SOB will go away, and she falls asleep.   Denies any pain, discomfort, no fast heart beat.  Daughter was with pt today, she was normal, they did a yoga class together, and she had no SOB episodes during yoga.  She will take naps during the day, laying down, and this doesn't occur during the daytime.    Appt scheduled with Rita Zapata for Monday. 11/22/2021 11:00 AM  In the meantime, rec'ed that when she feels short of breath when laying down.-- she can try Sitting propped up on one or more pillows.    Do you have any recommendations for pt?  "

## 2021-11-19 NOTE — TELEPHONE ENCOUNTER
Thank you Dr. Kovacs.     Pt denies any heart issues, and no reports of heart disease in family. Episodes occur when first laying down, eventually falls asleep, and seem to get better. No leg swelling.

## 2021-11-22 ENCOUNTER — OFFICE VISIT (OUTPATIENT)
Dept: INTERNAL MEDICINE | Facility: CLINIC | Age: 86
End: 2021-11-22
Payer: MEDICARE

## 2021-11-22 VITALS
BODY MASS INDEX: 28.5 KG/M2 | RESPIRATION RATE: 16 BRPM | OXYGEN SATURATION: 97 % | WEIGHT: 160.9 LBS | SYSTOLIC BLOOD PRESSURE: 136 MMHG | TEMPERATURE: 97.9 F | HEART RATE: 75 BPM | DIASTOLIC BLOOD PRESSURE: 68 MMHG

## 2021-11-22 DIAGNOSIS — K21.9 GASTROESOPHAGEAL REFLUX DISEASE WITHOUT ESOPHAGITIS: Primary | ICD-10-CM

## 2021-11-22 PROCEDURE — 99213 OFFICE O/P EST LOW 20 MIN: CPT | Performed by: PHYSICIAN ASSISTANT

## 2021-11-22 NOTE — PROGRESS NOTES
Assessment & Plan     Gastroesophageal reflux disease without esophagitis  Suspect full stomach as cause of feeling that she is not getting in a good breath.  Notes does not seem to have issues on the weekend when she has the larger meal in the middle of the day  No issues with napping.                Patient Instructions     Patient Education     Tips to Control Acid Reflux    To control acid reflux, you ll need to make some basic diet and lifestyle changes. The simple steps outlined below may be all you ll need to ease discomfort.   Watch what you eat    Don't have fatty foods or spicy foods.    Eat fewer acidic foods, such as citrus and tomato-based foods. These can increase symptoms.    Limit drinking alcohol, caffeine, and fizzy beverages. All increase acid reflux.    Try limiting chocolate, peppermint, and spearmint. These can make acid reflux worse in some people.    Watch when you eat    Don't lie down for 3 hours after eating.    Don't snack before going to bed.    Raise your head  Raising your head and upper body by 4 to 6 inches helps limit reflux when you re lying down. Put blocks under the head of your bed frame or a wedge under your mattress to raise it.   Other changes    Lose weight, if you need to    Don t exercise near bedtime    Don't wear tight-fitting clothes    Limit aspirin and ibuprofen    Stop smoking    Shidonni last reviewed this educational content on 6/1/2019 2000-2021 [employer]. All rights reserved. This information is not intended as a substitute for professional medical care. Always follow your healthcare professional's instructions.               No follow-ups on file.    Rita Zapata PA-C  [facility] Gonzales is a 87 year old who presents for the following health issues  accompanied by her daughter.    HPI     Concern - Breathing concerns  Onset: 2 weeks ago   Description: When going to bed and lays down  feels sob   Intensity: mild  Progression of Symptoms:  same and intermittent  Accompanying Signs & Symptoms: Happens only when laying down to go to bed at night. Does not happen when takes a nap. No pain in chest or discomfort  Previous history of similar problem:   Precipitating factors:        Worsened by:   Alleviating factors:        Improved by:   Therapies tried and outcome:     Wondering if GERD as a cause- since she eats and does not to bed early after that - especially in the winter       Review of Systems   Constitutional, HEENT, cardiovascular, pulmonary, gi and gu systems are negative, except as otherwise noted.      Objective    /68   Pulse 75   Temp 97.9  F (36.6  C) (Tympanic)   Resp 16   Wt 73 kg (160 lb 14.4 oz)   SpO2 97%   BMI 28.50 kg/m    Body mass index is 28.5 kg/m .  Physical Exam   GENERAL: healthy, alert and no distress  RESP: lungs clear to auscultation - no rales, rhonchi or wheezes  CV: regular rates and rhythm and normal S1 S2, no S3 or S4  ABDOMEN: soft, nontender, no hepatosplenomegaly, no masses and bowel sounds normal  MS: no gross musculoskeletal defects noted, no edema  SKIN: no suspicious lesions or rashes

## 2021-12-02 ENCOUNTER — TELEPHONE (OUTPATIENT)
Dept: BEHAVIORAL HEALTH | Facility: CLINIC | Age: 86
End: 2021-12-02
Payer: MEDICARE

## 2021-12-06 ENCOUNTER — VIRTUAL VISIT (OUTPATIENT)
Dept: PSYCHOLOGY | Facility: CLINIC | Age: 86
End: 2021-12-06
Attending: INTERNAL MEDICINE
Payer: MEDICARE

## 2021-12-06 DIAGNOSIS — F32.0 MILD MAJOR DEPRESSION (H): ICD-10-CM

## 2021-12-06 PROCEDURE — 90832 PSYTX W PT 30 MINUTES: CPT | Mod: 95 | Performed by: SOCIAL WORKER

## 2021-12-06 ASSESSMENT — COLUMBIA-SUICIDE SEVERITY RATING SCALE - C-SSRS
2. HAVE YOU ACTUALLY HAD ANY THOUGHTS OF KILLING YOURSELF LIFETIME?: NO
4. HAVE YOU HAD THESE THOUGHTS AND HAD SOME INTENTION OF ACTING ON THEM?: NO
5. HAVE YOU STARTED TO WORK OUT OR WORKED OUT THE DETAILS OF HOW TO KILL YOURSELF? DO YOU INTEND TO CARRY OUT THIS PLAN?: NO
1. IN THE PAST MONTH, HAVE YOU WISHED YOU WERE DEAD OR WISHED YOU COULD GO TO SLEEP AND NOT WAKE UP?: NO
ATTEMPT LIFETIME: NO
5. HAVE YOU STARTED TO WORK OUT OR WORKED OUT THE DETAILS OF HOW TO KILL YOURSELF? DO YOU INTEND TO CARRY OUT THIS PLAN?: NO
1. IN THE PAST MONTH, HAVE YOU WISHED YOU WERE DEAD OR WISHED YOU COULD GO TO SLEEP AND NOT WAKE UP?: NO
ATTEMPT PAST THREE MONTHS: NO
4. HAVE YOU HAD THESE THOUGHTS AND HAD SOME INTENTION OF ACTING ON THEM?: NO
3. HAVE YOU BEEN THINKING ABOUT HOW YOU MIGHT KILL YOURSELF?: NO
2. HAVE YOU ACTUALLY HAD ANY THOUGHTS OF KILLING YOURSELF?: NO

## 2021-12-06 NOTE — PROGRESS NOTES
"    [facility]   Mental Health & Addiction Services     Progress Note - Initial Visit    Patient  Name:  Janet Cope Date: 2021         Service Type: Individual     Visit Start Time: 12pm  Visit End Time: 12:20pm    Visit #: 1    Attendees: Client attended alone    Service Modality:  Phone Visit:      Provider verified identity through the following two step process.  Patient provided:  Patient     The patient has been notified of the following:      \"We have found that certain health care needs can be provided without the need for a face to face visit.  This service lets us provide the care you need with a phone conversation.       I will have full access to your [facility] medical record during this entire phone call.   I will be taking notes for your medical record.      Since this is like an office visit, we will bill your insurance company for this service.       There are potential benefits and risks of telephone visits (e.g. limits to patient confidentiality) that differ from in-person visits.?Confidentiality still applies for telephone services, and nobody will record the visit.  It is important to be in a quiet, private space that is free of distractions (including cell phone or other devices) during the visit.??      If during the course of the call I believe a telephone visit is not appropriate, you will not be charged for this service\"     Consent has been obtained for this service by care team member: Yes        DATA:   Interactive Complexity: No   Crisis: No     Presenting Concerns/  Current Stressors:   Client reports she was referred for counseling a couple of months ago as she was feeling depressed. Reports she has been on antidepressant medication for about 4 months now. Reports between the medication and working on addressing her concerns directly with her family she is feeling considerably better and no longer feels she needs support from counseling. "       ASSESSMENT:  Mental Status Assessment:  Appearance:   DYAN   Eye Contact:   DYAN   Psychomotor Behavior: DYAN   Attitude:   Cooperative   Orientation:   All  Speech   Rate / Production: Normal/ Responsive   Volume:  Normal   Mood:    Normal  Affect:    Appropriate   Thought Content:  Clear   Thought Form:  Logical   Insight:    Good       Safety Issues and Plan for Safety and Risk Management:     [place] Suicide Severity Rating Scale (Lifetime/Recent)  [place] Suicide Severity Rating (Lifetime/Recent) 12/6/2021   1. Wish to be Dead (Lifetime) No   1. Wish to be Dead (Recent) No   2. Non-Specific Active Suicidal Thoughts (Lifetime) No   2. Non-Specific Active Suicidal Thoughts (Recent) No   3. Active Suicidal Ideation with any Methods (Not Plan) Without Intent to Act (Lifetime) No   3. Active Suicidal Ideation with any Methods (Not Plan) Without Intent to Act (Recent) No   4. Active Suicidal Ideation with Some Intent to Act, Without Specific Plan (Lifetime) No   4. Active Suicidal Ideation with Some Intent to Act, Without Specific Plan (Recent) No   5. Active Suicidal Ideation with Specific Plan and Intent (Lifetime) No   5. Active Suicidal Ideation with Specific Plan and Intent (Recent) No   Actual Attempt (Lifetime) No   Actual Attempt (Past 3 Months) No     Patient denies current fears or concerns for personal safety.  Patient denies current or recent suicidal ideation or behaviors.  Patient denies current or recent homicidal ideation or behaviors.  Patient denies current or recent self injurious behavior or ideation.  Patient denies other safety concerns.  Recommended that patient call 911 or go to the local ED should there be a change in any of these risk factors.  Patient reports there are no firearms in the house.     Diagnostic Criteria:  Major Depressive Disorder  A) Single episode - symptoms have been present during the same 2-week period and represent a change from previous functioning 5 or more  symptoms (required for diagnosis)   - Depressed mood. Note: In children and adolescents, can be irritable mood.     - Diminished interest or pleasure in all, or almost all, activities.    - Fatigue or loss of energy.    - Feelings of worthlessness or excessive guilt.    - Diminished ability to think or concentrate, or indecisiveness.   B) The symptoms cause clinically significant distress or impairment in social, occupational, or other important areas of functioning  C) The episode is not attributable to the physiological effects of a substance or to another medical condition  D) The occurence of major depressive episode is not better explained by other thought / psychotic disorders  E) There has never been a manic episode or hypomanic episode      DSM5 Diagnoses: (Sustained by DSM5 Criteria Listed Above)  Diagnoses: 296.22 (F32.1)  Major Depressive Disorder, Single Episode, Moderate _  Psychosocial & Contextual Factors: aging, changes in functioning at home  WHODAS 2.0 (12 item): No flowsheet data found.  Intervention:   Solution focused: Assessed current symptoms and identified plan of action for client in managing depression  Collateral Reports Completed:  Not Applicable      PLAN: (Homework, other):  1. Client will continue with medication regime as prescribed by PCP. Will pursue therapy in the future if symptoms worsen.      TRENT Fishman, LICSW  December 6, 2021

## 2022-02-03 NOTE — PROGRESS NOTES
Surgical Office Location:  [facility]  [address] [place] [id number]     show

## 2022-02-05 DIAGNOSIS — I10 ESSENTIAL HYPERTENSION, BENIGN: ICD-10-CM

## 2022-02-07 RX ORDER — AMLODIPINE BESYLATE 10 MG/1
TABLET ORAL
Qty: 90 TABLET | Refills: 2 | Status: SHIPPED | OUTPATIENT
Start: 2022-02-07 | End: 2022-06-04

## 2022-02-07 NOTE — TELEPHONE ENCOUNTER
Prescription approved per [facility] Refill Protocol.  Dottie Mckeon, RN  [facility] Triage Nurse

## 2022-02-15 DIAGNOSIS — F41.8 SITUATIONAL ANXIETY: ICD-10-CM

## 2022-02-15 RX ORDER — BUSPIRONE HYDROCHLORIDE 15 MG/1
TABLET ORAL
Qty: 90 TABLET | Refills: 1 | Status: SHIPPED | OUTPATIENT
Start: 2022-02-15 | End: 2022-06-04

## 2022-02-15 NOTE — TELEPHONE ENCOUNTER
Prescription approved per [facility] Refill Protocol.  Dottie Mckeon, RN  [facility] Triage Nurse

## 2022-04-04 ENCOUNTER — MYC MEDICAL ADVICE (OUTPATIENT)
Dept: INTERNAL MEDICINE | Facility: CLINIC | Age: 87
End: 2022-04-04
Payer: MEDICARE

## 2022-04-08 ENCOUNTER — LAB (OUTPATIENT)
Dept: LAB | Facility: CLINIC | Age: 87
End: 2022-04-08
Payer: MEDICARE

## 2022-04-08 ENCOUNTER — OFFICE VISIT (OUTPATIENT)
Dept: INTERNAL MEDICINE | Facility: CLINIC | Age: 87
End: 2022-04-08
Payer: MEDICARE

## 2022-04-08 VITALS
TEMPERATURE: 98.2 F | RESPIRATION RATE: 16 BRPM | HEART RATE: 83 BPM | OXYGEN SATURATION: 98 % | DIASTOLIC BLOOD PRESSURE: 80 MMHG | SYSTOLIC BLOOD PRESSURE: 132 MMHG

## 2022-04-08 DIAGNOSIS — J31.0 CHRONIC RHINITIS: ICD-10-CM

## 2022-04-08 DIAGNOSIS — E11.22 TYPE 2 DIABETES MELLITUS WITH STAGE 3 CHRONIC KIDNEY DISEASE, WITHOUT LONG-TERM CURRENT USE OF INSULIN (H): ICD-10-CM

## 2022-04-08 DIAGNOSIS — R09.82 POST-NASAL DRAINAGE: Primary | ICD-10-CM

## 2022-04-08 DIAGNOSIS — J02.9 SORE THROAT: ICD-10-CM

## 2022-04-08 DIAGNOSIS — N18.30 TYPE 2 DIABETES MELLITUS WITH STAGE 3 CHRONIC KIDNEY DISEASE, WITHOUT LONG-TERM CURRENT USE OF INSULIN (H): ICD-10-CM

## 2022-04-08 DIAGNOSIS — I10 ESSENTIAL HYPERTENSION, BENIGN: ICD-10-CM

## 2022-04-08 LAB
ANION GAP SERPL CALCULATED.3IONS-SCNC: 6 MMOL/L (ref 3–14)
BUN SERPL-MCNC: 25 MG/DL (ref 7–30)
CALCIUM SERPL-MCNC: 9.7 MG/DL (ref 8.5–10.1)
CHLORIDE BLD-SCNC: 106 MMOL/L (ref 94–109)
CO2 SERPL-SCNC: 26 MMOL/L (ref 20–32)
CREAT SERPL-MCNC: 1.14 MG/DL (ref 0.52–1.04)
CREAT UR-MCNC: 193 MG/DL
GFR SERPL CREATININE-BSD FRML MDRD: 46 ML/MIN/1.73M2
GLUCOSE BLD-MCNC: 125 MG/DL (ref 70–99)
HBA1C MFR BLD: 5.3 % (ref 0–5.6)
MICROALBUMIN UR-MCNC: 119 MG/L
MICROALBUMIN/CREAT UR: 61.66 MG/G CR (ref 0–25)
POTASSIUM BLD-SCNC: 3.8 MMOL/L (ref 3.4–5.3)
SODIUM SERPL-SCNC: 138 MMOL/L (ref 133–144)

## 2022-04-08 PROCEDURE — 82043 UR ALBUMIN QUANTITATIVE: CPT

## 2022-04-08 PROCEDURE — 83036 HEMOGLOBIN GLYCOSYLATED A1C: CPT

## 2022-04-08 PROCEDURE — 80048 BASIC METABOLIC PNL TOTAL CA: CPT

## 2022-04-08 PROCEDURE — 99213 OFFICE O/P EST LOW 20 MIN: CPT | Performed by: PHYSICIAN ASSISTANT

## 2022-04-08 PROCEDURE — 36415 COLL VENOUS BLD VENIPUNCTURE: CPT

## 2022-04-08 ASSESSMENT — PATIENT HEALTH QUESTIONNAIRE - PHQ9
10. IF YOU CHECKED OFF ANY PROBLEMS, HOW DIFFICULT HAVE THESE PROBLEMS MADE IT FOR YOU TO DO YOUR WORK, TAKE CARE OF THINGS AT HOME, OR GET ALONG WITH OTHER PEOPLE: SOMEWHAT DIFFICULT
SUM OF ALL RESPONSES TO PHQ QUESTIONS 1-9: 12
SUM OF ALL RESPONSES TO PHQ QUESTIONS 1-9: 12

## 2022-04-08 NOTE — PROGRESS NOTES
Assessment & Plan     Post-nasal drainage      Sore throat      Chronic rhinitis                 Patient Instructions   Claritin  Or Zyrtec, or Allegra - once a day.   Antihistamine     Flonase  Or Nasocort - both are over the counter   1 spray each side of nose once a day           Return in about 4 weeks (around 5/6/2022) for Routine Visit, regular primary provider, diabetes follow up and rhinitis follow up .    Rita Zapata PA-C  [facility] MARGARITA Gonzales is a 87 year old who presents for the following health issues  accompanied by her daughter.    History of Present Illness       Mental Health Follow-up:                    Today's PHQ-9         PHQ-9 Total Score: 12  PHQ-9 Q9 Thoughts of better off dead/self-harm past 2 weeks :   (P) Not at all    How difficult have these problems made it for you to do your work, take care of things at home, or get along with other people: Somewhat difficult        Reason for visit:  Lingering cough and sore throat   Symptoms include:  Cough and sore throat  Symptom intensity:  Severe  Symptom progression:  Staying the same  Had these symptoms before:  Yes  Has tried/received treatment for these symptoms:  Yes  Previous treatment was successful:  No  What makes it worse:  Laying down  What makes it better:  Getting fresh air    She eats 4 or more servings of fruits and vegetables daily.She consumes 2 sweetened beverage(s) daily.She exercises with enough effort to increase her heart rate 20 to 29 minutes per day.  She exercises with enough effort to increase her heart rate 5 days per week.   She is taking medications regularly.       Allergies? - Post nasal drainage, cough productive and sore throat   Onset/Duration: months of   Symptoms:   Nasal congestion: yes and Post nasal drainage   Sneezing: YES sometimes   Red, itchy eyes: no  Progression of Symptoms: same  Accompanying Signs & Symptoms:  Cough: YES- with clear phlegm  as well   Wheezing: no  Rash: no  Sinus/facial pain: no  Precipitating factors:   None  Alleviating factors:    Therapies tried and outcome: cough drops-   Allergy medication,  mucinex   Robitussin.    Congestion,   Runny nose        Review of Systems   Constitutional, HEENT, cardiovascular, pulmonary, gi and gu systems are negative, except as otherwise noted.      Objective    /80   Pulse 83   Temp 98.2  F (36.8  C) (Tympanic)   Resp 16   SpO2 98%   There is no height or weight on file to calculate BMI.  Physical Exam   GENERAL: healthy, alert and no distress  HENT: normal cephalic/atraumatic, ear canals and TM's normal, nasal mucosa edematous , rhinorrhea clear, oropharynx clear and oral mucous membranes moist  NECK: no adenopathy, no asymmetry, masses, or scars and thyroid normal to palpation  RESP: lungs clear to auscultation - no rales, rhonchi or wheezes  CV: regular rates and rhythm and normal S1 S2, no S3 or S4  SKIN: no suspicious lesions or rashes

## 2022-04-08 NOTE — PATIENT INSTRUCTIONS
Claritin  Or Zyrtec, or Allegra - once a day.   Antihistamine     Flonase  Or Nasocort - both are over the counter   1 spray each side of nose once a day

## 2022-04-09 ASSESSMENT — PATIENT HEALTH QUESTIONNAIRE - PHQ9: SUM OF ALL RESPONSES TO PHQ QUESTIONS 1-9: 12

## 2022-04-16 ENCOUNTER — HEALTH MAINTENANCE LETTER (OUTPATIENT)
Age: 87
End: 2022-04-16

## 2022-05-03 ENCOUNTER — TELEPHONE (OUTPATIENT)
Dept: INTERNAL MEDICINE | Facility: CLINIC | Age: 87
End: 2022-05-03
Payer: MEDICARE

## 2022-05-03 NOTE — TELEPHONE ENCOUNTER
Patient Quality Outreach    Patient is due for the following:   Diabetes -  Diabetic Follow-Up Visit  Physical  - 04/23/2020  Immunizations  -  Covid and Tdap    NEXT STEPS:   Schedule a office visit for Diabetes and Wellness    Type of outreach:    Sent [employer] message.    Next Steps:  Reach out within 90 days via [employer].    Max number of attempts reached: No. Will try again in 90 days if patient still on fail list.    Questions for provider review:    None     Rita Montoya [facility]  Chart routed to Care Team.

## 2022-06-03 ENCOUNTER — OFFICE VISIT (OUTPATIENT)
Dept: INTERNAL MEDICINE | Facility: CLINIC | Age: 87
End: 2022-06-03
Payer: MEDICARE

## 2022-06-03 VITALS
RESPIRATION RATE: 16 BRPM | HEART RATE: 86 BPM | OXYGEN SATURATION: 97 % | DIASTOLIC BLOOD PRESSURE: 64 MMHG | TEMPERATURE: 98.2 F | SYSTOLIC BLOOD PRESSURE: 132 MMHG | HEIGHT: 65 IN | WEIGHT: 160 LBS | BODY MASS INDEX: 26.66 KG/M2

## 2022-06-03 DIAGNOSIS — E78.5 HYPERLIPIDEMIA LDL GOAL <100: ICD-10-CM

## 2022-06-03 DIAGNOSIS — F32.0 MILD MAJOR DEPRESSION (H): ICD-10-CM

## 2022-06-03 DIAGNOSIS — N18.31 STAGE 3A CHRONIC KIDNEY DISEASE (H): ICD-10-CM

## 2022-06-03 DIAGNOSIS — H35.30 MACULAR DEGENERATION OF BOTH EYES, UNSPECIFIED TYPE: ICD-10-CM

## 2022-06-03 DIAGNOSIS — N18.31 TYPE 2 DIABETES MELLITUS WITH STAGE 3A CHRONIC KIDNEY DISEASE, WITHOUT LONG-TERM CURRENT USE OF INSULIN (H): ICD-10-CM

## 2022-06-03 DIAGNOSIS — R80.9 PROTEINURIA, UNSPECIFIED TYPE: ICD-10-CM

## 2022-06-03 DIAGNOSIS — I10 ESSENTIAL HYPERTENSION, BENIGN: ICD-10-CM

## 2022-06-03 DIAGNOSIS — K21.9 GASTROESOPHAGEAL REFLUX DISEASE WITHOUT ESOPHAGITIS: ICD-10-CM

## 2022-06-03 DIAGNOSIS — Z00.00 MEDICARE ANNUAL WELLNESS VISIT, SUBSEQUENT: ICD-10-CM

## 2022-06-03 DIAGNOSIS — E11.22 TYPE 2 DIABETES MELLITUS WITH STAGE 3A CHRONIC KIDNEY DISEASE, WITHOUT LONG-TERM CURRENT USE OF INSULIN (H): ICD-10-CM

## 2022-06-03 DIAGNOSIS — F41.9 ANXIETY: ICD-10-CM

## 2022-06-03 PROCEDURE — G0439 PPPS, SUBSEQ VISIT: HCPCS | Performed by: INTERNAL MEDICINE

## 2022-06-03 PROCEDURE — 99214 OFFICE O/P EST MOD 30 MIN: CPT | Mod: 25 | Performed by: INTERNAL MEDICINE

## 2022-06-03 ASSESSMENT — PATIENT HEALTH QUESTIONNAIRE - PHQ9
SUM OF ALL RESPONSES TO PHQ QUESTIONS 1-9: 7
SUM OF ALL RESPONSES TO PHQ QUESTIONS 1-9: 7
10. IF YOU CHECKED OFF ANY PROBLEMS, HOW DIFFICULT HAVE THESE PROBLEMS MADE IT FOR YOU TO DO YOUR WORK, TAKE CARE OF THINGS AT HOME, OR GET ALONG WITH OTHER PEOPLE: NOT DIFFICULT AT ALL

## 2022-06-03 NOTE — PATIENT INSTRUCTIONS
Continue current meds  Prescriptions refilled.    Call  [phone] or use [employer] to schedule a future lab appointment  fasting in mid November (blood and urine)    For fasting labs, please refrain from eating for 8 hours or more.   Drink 2 glasses of water before your lab appointment. It is fine to take your  oral medications on the morning of the lab test as usual  Would try to limit alcohol intake to 1 drink per day  I would recommend you receive an influenza (flu) vaccine  this Fall (October)  Pt was informed regarding extra [employer] billing for management of new or established medical issues not related to today's wellness visit

## 2022-06-03 NOTE — PROGRESS NOTES
ASSESSMENT:    1. Medicare annual wellness visit, subsequent  Counseled to limit alcohol intake to 1 glass of wine per day to reduce risk for falls, etc.  Due for flu shot this fall.  Other healthcare maintenance up-to-date for age.  Patient delines further breast cancer screening    2. Type 2 diabetes mellitus with stage 3a chronic kidney disease, without long-term current use of insulin (H)  Controlled.  Continue diet therapy.  Recheck labs in November  - Hemoglobin A1c; Future  - Comprehensive metabolic panel; Future  - Albumin Random Urine Quantitative with Creat Ratio; Future    3. Stage 3a chronic kidney disease (H)  Improved.  Recheck labs in November.  Patient denies NSAIDs use  - Comprehensive metabolic panel; Future  - Albumin Random Urine Quantitative with Creat Ratio; Future  - Hemoglobin; Future    4. Mild major depression (H)  PHQ remains mildly above goal but patient feels symptoms well controlled with current medication.  Continue escitalopram  - escitalopram (LEXAPRO) 10 MG tablet; Take 1 tablet (10 mg) by mouth daily  Dispense: 90 tablet; Refill: 3    5. Anxiety  Controlled. MARIAN = 0.  Continue current medication  - escitalopram (LEXAPRO) 10 MG tablet; Take 1 tablet (10 mg) by mouth daily  Dispense: 90 tablet; Refill: 3  - busPIRone (BUSPAR) 15 MG tablet; 1/2 tab tab twice a day for anxiety  Dispense: 90 tablet; Refill: 3    6. Hyperlipidemia LDL goal <100  Controlled.  Continue current medication.  Repeat labs in November  - Comprehensive metabolic panel; Future  - Lipid panel reflex to direct LDL Fasting; Future  - atorvastatin (LIPITOR) 20 MG tablet; Take 1 tablet (20 mg) by mouth daily  Dispense: 90 tablet; Refill: 3    7. Proteinuria, unspecified type  Mild worsening.  Patient on maximum dose ARB.  Blood pressure okay today.  Recheck lab in November  - Albumin Random Urine Quantitative with Creat Ratio; Future    8. Macular degeneration of both eyes, unspecified type  Stable symptoms.   Patient declines further ophthalmology evaluation    9. Essential hypertension, benign  Controlled.  Continue current medication  - amLODIPine (NORVASC) 10 MG tablet; TAKE 1 TABLET(10 MG) BY MOUTH DAILY IN THE EVENING  Dispense: 90 tablet; Refill: 3  - losartan (COZAAR) 50 MG tablet; Take 1 tablet (50 mg) by mouth daily  Dispense: 90 tablet; Refill: 3    10. Gastroesophageal reflux disease without esophagitis  Controlled.  Patient has recurrence of symptoms off of twice daily PPI therapy.  We will continue current medication  - omeprazole (PRILOSEC) 20 MG DR capsule; TAKE 1 CAPSULE(20 MG) BY MOUTH TWICE DAILY  Dispense: 180 capsule; Refill: 3      PLAN:  Continue current meds  Prescriptions refilled.    Call  [phone] or use [facility] to schedule a future lab appointment  fasting in mid November (blood and urine)    For fasting labs, please refrain from eating for 8 hours or more.   Drink 2 glasses of water before your lab appointment. It is fine to take your  oral medications on the morning of the lab test as usual  Would try to limit alcohol intake to 1 drink per day  I would recommend you receive an influenza (flu) vaccine  this Fall (October)  Pt was informed regarding extra E&M billing for management of new or established medical issues not related to today's wellness visit      (Chart documentation was completed, in part, with [employer] voice-recognition software. Even though reviewed, some grammatical, spelling, and word errors may remain.)    Frank Kovacs MD  Internal Medicine Department  [facility] MARGARITA Gonzales is a 87 year old who presents for the following health issues accompanied by her daughter    History of Present Illness       Reason for visit:  Blood work recheck    She eats 2-3 servings of fruits and vegetables daily.She consumes 0 sweetened beverage(s) daily.She exercises with enough effort to increase her heart rate 20 to 29 minutes per day.  She  exercises with enough effort to increase her heart rate 7 days per week.   She is taking medications regularly.    Today's PHQ-9         PHQ-9 Total Score: 7    PHQ-9 Q9 Thoughts of better off dead/self-harm past 2 weeks :   Not at all    How difficult have these problems made it for you to do your work, take care of things at home, or get along with other people: Not difficult at all       Diabetes Follow-up    How often are you checking your blood sugar? A few times a month just took blood sugar level and it was 123  What time of day are you checking your blood sugars (select all that apply)?  Before meals  Have you had any blood sugars above 200?  No  Have you had any blood sugars below 70?  Yes     What symptoms do you notice when your blood sugar is low?  None    What concerns do you have today about your diabetes? None     Do you have any of these symptoms? (Select all that apply)  No numbness or tingling in feet.  No redness, sores or blisters on feet.  No complaints of excessive thirst.  No reports of blurry vision.  No significant changes to weight.    Have you had a diabetic eye exam in the last 12 months? No        BP Readings from Last 2 Encounters:   04/08/22 132/80   11/22/21 136/68     Hemoglobin A1C POCT (%)   Date Value   04/01/2021 5.6   01/23/2020 5.6     Hemoglobin A1C (%)   Date Value   04/08/2022 5.3   09/25/2021 5.4     LDL Cholesterol Calculated (mg/dL)   Date Value   09/25/2021 76   10/19/2020 83   10/08/2019 88           Annual Wellness Visit    Patient has been advised of split billing requirements and indicates understanding: Yes     Are you in the first 12 months of your Medicare Part B coverage?  No    Physical Health:    In general, how would you rate your overall physical health? excellent    Outside of work, how many days during the week do you exercise?6-7 days/week    Outside of work, approximately how many minutes a day do you exercise?15-30 minutes  If you drink alcohol do you  "typically have >3 drinks per day or >7 drinks per week? Yes. Has 2 glasses of wine per day        Do you usually eat at least 4 servings of fruit and vegetables a day, include whole grains & fiber and avoid regularly eating high fat or \"junk\" foods? Yes    Do you have any problems taking medications regularly? No    Do you have any side effects from medications? none    Needs assistance for the following daily activities: housework and laundry    Which of the following safety concerns are present in your home?  none identified     Hearing impairment: No    In the past 6 months, have you been bothered by leaking of urine? yes    Mental Health:    In general, how would you rate your overall mental or emotional health? good  PHQ-2 Score:      Do you feel safe in your environment? Yes    Have you ever done Advance Care Planning? (For example, a Health Directive, POLST, or a discussion with a medical provider or your loved ones about your wishes)? No, advance care planning information given to patient to review.  Patient plans to discuss their wishes with loved ones or provider.      Fall risk:  Fallen 2 or more times in the past year?: No  Any fall with injury in the past year?: No    Cognitive Screenin) Repeat 3 items (Leader, Season, Table)    2) Clock draw: NORMAL  3) 3 item recall: Recalls 2 objects   Results: NORMAL clock, 1-2 items recalled: COGNITIVE IMPAIRMENT LESS LIKELY    Mini-CogTM Copyright S Boni. Licensed by the author for use in [facility]; reprinted with permission (brown@[facility]). All rights reserved.      Do you have sleep apnea, excessive snoring or daytime drowsiness?: no    Current providers sharing in care for this patient include:   Patient Care Team:  Frank Kovacs MD as PCP - General  Frank Kovacs MD as Assigned PCP    Patient has been advised of split billing requirements and indicates understanding: Yes    Component      Latest Ref Rng & Units 2021 "   Sodium      133 - 144 mmol/L 137 138   Potassium      3.4 - 5.3 mmol/L 3.9 3.8   Chloride      94 - 109 mmol/L 105 106   Carbon Dioxide      20 - 32 mmol/L 25 26   Anion Gap      3 - 14 mmol/L 7 6   Urea Nitrogen      7 - 30 mg/dL 21 25   Creatinine      0.52 - 1.04 mg/dL 1.01 1.14 (H)   Calcium      8.5 - 10.1 mg/dL 9.5 9.7   Glucose      70 - 99 mg/dL 105 (H) 125 (H)   Alkaline Phosphatase      40 - 150 U/L 88    AST      0 - 45 U/L 29    ALT      0 - 50 U/L 27    Protein Total      6.8 - 8.8 g/dL 8.6    Albumin      3.4 - 5.0 g/dL 4.1    Bilirubin Total      0.2 - 1.3 mg/dL 0.7    GFR Estimate      >60 mL/min/1.73m2 50 (L) 46 (L)   Cholesterol      <200 mg/dL 177    Triglycerides      <150 mg/dL 64    HDL Cholesterol      >=50 mg/dL 88    LDL Cholesterol Calculated      <=100 mg/dL 76    Non HDL Cholesterol      <130 mg/dL 89    Patient Fasting > 8hrs?       Yes    Creatinine Urine      mg/dL  193   Albumin Urine mg/L      mg/L  119   Albumin Urine mg/g Cr      0.00 - 25.00 mg/g Cr  61.66 (H)   Phosphorus      2.5 - 4.5 mg/dL 3.9    Vitamin D Deficiency screening      20 - 75 ug/L 60    Parathyroid Hormone Intact      18 - 80 pg/mL 56    Hemoglobin      11.7 - 15.7 g/dL 13.1    Hemoglobin A1C      0.0 - 5.6 % 5.4 5.3         Review of Systems   CONSTITUTIONAL: NEGATIVE for fever, chills.  Has lost 5 pounds in the last year with improved diet and exercise  INTEGUMENTARY/SKIN: NEGATIVE for worrisome rashes, moles or lesions  EYES: NEGATIVE for  Irritation.  Has macular degeneration with reduced central acuity.  Patient states she has been told there is nothing they can do for this.  Patient declines further eye examinations   ENT/MOUTH: NEGATIVE for ear, mouth and throat problems  RESP: NEGATIVE for significant cough or SOB  BREAST: NEGATIVE for masses, tenderness or discharge  CV: NEGATIVE for chest pain, palpitations or peripheral edema  GI: NEGATIVE for nausea, abdominal pain, heartburn, or change in bowel  "habits  : NEGATIVE for frequency, dysuria, or hematuria  MUSCULOSKELETAL: NEGATIVE for significant arthralgias or myalgia  NEURO: NEGATIVE for weakness, dizziness or paresthesias  ENDOCRINE: NEGATIVE for temperature intolerance.  History of diabetes.  See labs above  HEME: NEGATIVE for bleeding problems  PSYCHIATRIC:  POSITIVE for history of anxiety depression.  Patient states depression much better. PHQ = 7.  States her  has been helping out more at home and they have been talking more also which makes patient happy.  Denies anxiety symptoms.  On medication therapy and wishes to continue      Objective    /64 (BP Location: Left arm, Patient Position: Chair, Cuff Size: Adult Regular)   Pulse 86   Temp 98.2  F (36.8  C) (Oral)   Resp 16   Ht 1.638 m (5' 4.5\")   Wt 72.6 kg (160 lb)   SpO2 97%   BMI 27.04 kg/m    There is no height or weight on file to calculate BMI.  Physical Exam    General appearance -   alert, no distress. Pleasant affect.   Skin - No rashes or lesions.  Head - normocephalic, atraumatic  Eyes - ROBERT, EOMI, fundi exam with nondilated pupils negative.  Reduced acuity to reading fine print centrally  Ears - External ears normal. Canals clear. TM's normal.  Nose/Sinuses - Nares normal. Septum midline. Mucosa normal. No drainage or sinus tenderness.  Oropharynx - No erythema, no adenopathy, no exudates.  Neck - Supple without adenopathy or thyromegaly. No bruits.  Lungs - Clear to auscultation without wheezes/rhonchi.  Heart - Regular rate and rhythm without murmurs, clicks, or gallops.  Nodes - No supraclavicular, axillary, or inguinal adenopathy palpable.  Breasts - deferred  Abdomen - Abdomen soft, non-tender. BS normal. No masses or hepatosplenomegaly palpable. No bruits.  Extremities -No cyanosis, clubbing or edema.    Musculoskeletal - Spine ROM normal. Muscular strength intact.  DIP and PIP joints of hands with some osteoarthritis thickening.  No increased warmth/erythema.  " No current tenderness to hand/finger  range of motion  Peripheral pulses - radial=4/4, femoral=4/4, posterior tibial=4/4, dorsalis pedis=4/4,  Neuro - Gait normal. Reflexes normal and symmetric. Sensation grossly WNL.  Genital/Rectal - deferred

## 2022-06-04 PROBLEM — R80.9 PROTEINURIA, UNSPECIFIED TYPE: Status: ACTIVE | Noted: 2022-06-04

## 2022-06-04 RX ORDER — ESCITALOPRAM OXALATE 10 MG/1
10 TABLET ORAL DAILY
Qty: 90 TABLET | Refills: 3 | Status: SHIPPED | OUTPATIENT
Start: 2022-06-04 | End: 2023-06-09

## 2022-06-04 RX ORDER — BUSPIRONE HYDROCHLORIDE 15 MG/1
TABLET ORAL
Qty: 90 TABLET | Refills: 3 | Status: SHIPPED | OUTPATIENT
Start: 2022-06-04 | End: 2023-07-21

## 2022-06-04 RX ORDER — ATORVASTATIN CALCIUM 20 MG/1
20 TABLET, FILM COATED ORAL DAILY
Qty: 90 TABLET | Refills: 3 | Status: SHIPPED | OUTPATIENT
Start: 2022-06-04 | End: 2023-06-09

## 2022-06-04 RX ORDER — LOSARTAN POTASSIUM 50 MG/1
50 TABLET ORAL DAILY
Qty: 90 TABLET | Refills: 3 | Status: SHIPPED | OUTPATIENT
Start: 2022-06-04 | End: 2023-06-09

## 2022-06-04 RX ORDER — AMLODIPINE BESYLATE 10 MG/1
TABLET ORAL
Qty: 90 TABLET | Refills: 3 | Status: SHIPPED | OUTPATIENT
Start: 2022-06-04 | End: 2023-01-19

## 2022-06-04 ASSESSMENT — ANXIETY QUESTIONNAIRES
7. FEELING AFRAID AS IF SOMETHING AWFUL MIGHT HAPPEN: NOT AT ALL
GAD7 TOTAL SCORE: 0
3. WORRYING TOO MUCH ABOUT DIFFERENT THINGS: NOT AT ALL
1. FEELING NERVOUS, ANXIOUS, OR ON EDGE: NOT AT ALL
GAD7 TOTAL SCORE: 0
5. BEING SO RESTLESS THAT IT IS HARD TO SIT STILL: NOT AT ALL
IF YOU CHECKED OFF ANY PROBLEMS ON THIS QUESTIONNAIRE, HOW DIFFICULT HAVE THESE PROBLEMS MADE IT FOR YOU TO DO YOUR WORK, TAKE CARE OF THINGS AT HOME, OR GET ALONG WITH OTHER PEOPLE: NOT DIFFICULT AT ALL
2. NOT BEING ABLE TO STOP OR CONTROL WORRYING: NOT AT ALL
6. BECOMING EASILY ANNOYED OR IRRITABLE: NOT AT ALL

## 2022-06-04 ASSESSMENT — PATIENT HEALTH QUESTIONNAIRE - PHQ9: 5. POOR APPETITE OR OVEREATING: NOT AT ALL

## 2022-06-07 ENCOUNTER — MYC MEDICAL ADVICE (OUTPATIENT)
Dept: INTERNAL MEDICINE | Facility: CLINIC | Age: 87
End: 2022-06-07

## 2022-06-08 NOTE — TELEPHONE ENCOUNTER
Please see mychart from patient and advise appropriate course of action.    Is visit needed to provide this?    Maulik Eaton RN  [facility] Triage Nurse

## 2022-06-15 NOTE — TELEPHONE ENCOUNTER
Please see mychart from patient and advise appropriate course of action.      Maulik Eaton RN  [facility] Triage Nurse

## 2022-06-30 NOTE — TELEPHONE ENCOUNTER
Dr. Kovacs    Pt is requesting note stating necessity for a safety grab bar in shower.    Mimi Hoyos RN

## 2022-07-06 NOTE — TELEPHONE ENCOUNTER
Ana calling again and is frustrated that her father is not able to shower by himself in weeks due to not having shower handle bars because form is not signed by provider. She is requesting another provider to sign DME for shower bar today.     Form is from [facility] Reasonable Accomodation Request    Ana: [phone]    Lima Barnes,  [facility]

## 2022-07-06 NOTE — TELEPHONE ENCOUNTER
Dr Li printed a letter for patient as form could not be located. Also states if needed and that letter does not work send her the form and she can fill it out for patient.

## 2022-07-06 NOTE — TELEPHONE ENCOUNTER
"Dannielle called on behalf of patient, getting increasingly frustrated \"as this was supposed to be done weeks ago\".     Demands this note be signed as soon as possible by a different provider because \"it is just a note for shower bars, super simple\".     Dannielle expects a call back today to know if different provider can sign forms, Dr. Kovacs is not in the office today.   "

## 2022-10-06 ENCOUNTER — LAB (OUTPATIENT)
Dept: LAB | Facility: CLINIC | Age: 87
End: 2022-10-06
Payer: MEDICARE

## 2022-10-06 DIAGNOSIS — R80.9 PROTEINURIA, UNSPECIFIED TYPE: ICD-10-CM

## 2022-10-06 DIAGNOSIS — E11.22 TYPE 2 DIABETES MELLITUS WITH STAGE 3A CHRONIC KIDNEY DISEASE, WITHOUT LONG-TERM CURRENT USE OF INSULIN (H): ICD-10-CM

## 2022-10-06 DIAGNOSIS — E78.5 HYPERLIPIDEMIA LDL GOAL <100: ICD-10-CM

## 2022-10-06 DIAGNOSIS — N18.31 STAGE 3A CHRONIC KIDNEY DISEASE (H): ICD-10-CM

## 2022-10-06 DIAGNOSIS — N18.31 TYPE 2 DIABETES MELLITUS WITH STAGE 3A CHRONIC KIDNEY DISEASE, WITHOUT LONG-TERM CURRENT USE OF INSULIN (H): ICD-10-CM

## 2022-10-06 LAB
ALBUMIN SERPL-MCNC: 3.8 G/DL (ref 3.4–5)
ALP SERPL-CCNC: 84 U/L (ref 40–150)
ALT SERPL W P-5'-P-CCNC: 23 U/L (ref 0–50)
ANION GAP SERPL CALCULATED.3IONS-SCNC: 8 MMOL/L (ref 3–14)
AST SERPL W P-5'-P-CCNC: 22 U/L (ref 0–45)
BILIRUB SERPL-MCNC: 0.5 MG/DL (ref 0.2–1.3)
BUN SERPL-MCNC: 27 MG/DL (ref 7–30)
CALCIUM SERPL-MCNC: 9.5 MG/DL (ref 8.5–10.1)
CHLORIDE BLD-SCNC: 104 MMOL/L (ref 94–109)
CHOLEST SERPL-MCNC: 184 MG/DL
CO2 SERPL-SCNC: 27 MMOL/L (ref 20–32)
CREAT SERPL-MCNC: 1.12 MG/DL (ref 0.52–1.04)
CREAT UR-MCNC: 251 MG/DL
FASTING STATUS PATIENT QL REPORTED: YES
GFR SERPL CREATININE-BSD FRML MDRD: 47 ML/MIN/1.73M2
GLUCOSE BLD-MCNC: 108 MG/DL (ref 70–99)
HBA1C MFR BLD: 5.4 % (ref 0–5.6)
HDLC SERPL-MCNC: 81 MG/DL
HGB BLD-MCNC: 11.9 G/DL (ref 11.7–15.7)
LDLC SERPL CALC-MCNC: 84 MG/DL
MICROALBUMIN UR-MCNC: 42 MG/L
MICROALBUMIN/CREAT UR: 16.73 MG/G CR (ref 0–25)
NONHDLC SERPL-MCNC: 103 MG/DL
POTASSIUM BLD-SCNC: 4 MMOL/L (ref 3.4–5.3)
PROT SERPL-MCNC: 8.2 G/DL (ref 6.8–8.8)
SODIUM SERPL-SCNC: 139 MMOL/L (ref 133–144)
TRIGL SERPL-MCNC: 93 MG/DL

## 2022-10-06 PROCEDURE — 82043 UR ALBUMIN QUANTITATIVE: CPT

## 2022-10-06 PROCEDURE — 80053 COMPREHEN METABOLIC PANEL: CPT

## 2022-10-06 PROCEDURE — 36415 COLL VENOUS BLD VENIPUNCTURE: CPT

## 2022-10-06 PROCEDURE — 83036 HEMOGLOBIN GLYCOSYLATED A1C: CPT

## 2022-10-06 PROCEDURE — 80061 LIPID PANEL: CPT

## 2022-10-06 PROCEDURE — 85018 HEMOGLOBIN: CPT

## 2023-01-19 DIAGNOSIS — I10 ESSENTIAL HYPERTENSION, BENIGN: ICD-10-CM

## 2023-01-19 RX ORDER — AMLODIPINE BESYLATE 10 MG/1
TABLET ORAL
Qty: 90 TABLET | Refills: 1 | Status: SHIPPED | OUTPATIENT
Start: 2023-01-19 | End: 2023-06-09

## 2023-01-19 NOTE — TELEPHONE ENCOUNTER
Routing refill request to provider for review/approval because:  Labs out of range:    Creatinine   Date Value Ref Range Status   10/06/2022 1.12 (H) 0.52 - 1.04 mg/dL Final   04/01/2021 1.05 (H) 0.52 - 1.04 mg/dL Final          Vernell Mosley, RN  [facility]

## 2023-04-10 ENCOUNTER — DOCUMENTATION ONLY (OUTPATIENT)
Dept: LAB | Facility: CLINIC | Age: 88
End: 2023-04-10
Payer: MEDICARE

## 2023-04-10 DIAGNOSIS — N18.31 STAGE 3A CHRONIC KIDNEY DISEASE (H): Primary | ICD-10-CM

## 2023-04-10 DIAGNOSIS — E11.22 TYPE 2 DIABETES MELLITUS WITH STAGE 3A CHRONIC KIDNEY DISEASE, WITHOUT LONG-TERM CURRENT USE OF INSULIN (H): ICD-10-CM

## 2023-04-10 DIAGNOSIS — N18.31 TYPE 2 DIABETES MELLITUS WITH STAGE 3A CHRONIC KIDNEY DISEASE, WITHOUT LONG-TERM CURRENT USE OF INSULIN (H): ICD-10-CM

## 2023-04-10 NOTE — PROGRESS NOTES
Patient has upcoming lab appt scheduled 4/10    Appt notes: Lab work before check up    Please place orders or have care team contact patient for rescheduling.     Thank you.

## 2023-04-14 ENCOUNTER — LAB (OUTPATIENT)
Dept: LAB | Facility: CLINIC | Age: 88
End: 2023-04-14
Payer: MEDICARE

## 2023-04-14 DIAGNOSIS — N18.31 STAGE 3A CHRONIC KIDNEY DISEASE (H): ICD-10-CM

## 2023-04-14 DIAGNOSIS — N18.31 TYPE 2 DIABETES MELLITUS WITH STAGE 3A CHRONIC KIDNEY DISEASE, WITHOUT LONG-TERM CURRENT USE OF INSULIN (H): ICD-10-CM

## 2023-04-14 DIAGNOSIS — E11.22 TYPE 2 DIABETES MELLITUS WITH STAGE 3A CHRONIC KIDNEY DISEASE, WITHOUT LONG-TERM CURRENT USE OF INSULIN (H): ICD-10-CM

## 2023-04-14 LAB
ANION GAP SERPL CALCULATED.3IONS-SCNC: 14 MMOL/L (ref 7–15)
BUN SERPL-MCNC: 21.6 MG/DL (ref 8–23)
CALCIUM SERPL-MCNC: 9.6 MG/DL (ref 8.8–10.2)
CHLORIDE SERPL-SCNC: 103 MMOL/L (ref 98–107)
CREAT SERPL-MCNC: 0.95 MG/DL (ref 0.51–0.95)
DEPRECATED HCO3 PLAS-SCNC: 25 MMOL/L (ref 22–29)
GFR SERPL CREATININE-BSD FRML MDRD: 57 ML/MIN/1.73M2
GLUCOSE SERPL-MCNC: 109 MG/DL (ref 70–99)
HBA1C MFR BLD: 5.5 % (ref 0–5.6)
POTASSIUM SERPL-SCNC: 4.4 MMOL/L (ref 3.4–5.3)
SODIUM SERPL-SCNC: 142 MMOL/L (ref 136–145)

## 2023-04-14 PROCEDURE — 36415 COLL VENOUS BLD VENIPUNCTURE: CPT

## 2023-04-14 PROCEDURE — 83036 HEMOGLOBIN GLYCOSYLATED A1C: CPT

## 2023-04-14 PROCEDURE — 80048 BASIC METABOLIC PNL TOTAL CA: CPT

## 2023-05-04 ENCOUNTER — PATIENT OUTREACH (OUTPATIENT)
Dept: CARE COORDINATION | Facility: CLINIC | Age: 88
End: 2023-05-04
Payer: MEDICARE

## 2023-05-18 ENCOUNTER — PATIENT OUTREACH (OUTPATIENT)
Dept: CARE COORDINATION | Facility: CLINIC | Age: 88
End: 2023-05-18
Payer: MEDICARE

## 2023-06-09 DIAGNOSIS — I10 ESSENTIAL HYPERTENSION, BENIGN: ICD-10-CM

## 2023-06-09 DIAGNOSIS — E78.5 HYPERLIPIDEMIA LDL GOAL <100: ICD-10-CM

## 2023-06-09 DIAGNOSIS — F41.9 ANXIETY: ICD-10-CM

## 2023-06-09 DIAGNOSIS — F32.0 MILD MAJOR DEPRESSION (H): ICD-10-CM

## 2023-06-09 RX ORDER — ATORVASTATIN CALCIUM 20 MG/1
TABLET, FILM COATED ORAL
Qty: 30 TABLET | Refills: 0 | Status: SHIPPED | OUTPATIENT
Start: 2023-06-09 | End: 2023-06-09

## 2023-06-09 RX ORDER — LOSARTAN POTASSIUM 50 MG/1
TABLET ORAL
Qty: 90 TABLET | Refills: 1 | Status: SHIPPED | OUTPATIENT
Start: 2023-06-09 | End: 2023-12-03

## 2023-06-09 RX ORDER — ESCITALOPRAM OXALATE 10 MG/1
TABLET ORAL
Qty: 90 TABLET | Refills: 1 | Status: SHIPPED | OUTPATIENT
Start: 2023-06-09 | End: 2023-12-13

## 2023-06-09 RX ORDER — ATORVASTATIN CALCIUM 20 MG/1
20 TABLET, FILM COATED ORAL DAILY
Qty: 90 TABLET | Refills: 0 | Status: SHIPPED | OUTPATIENT
Start: 2023-06-09 | End: 2023-10-06

## 2023-06-09 RX ORDER — AMLODIPINE BESYLATE 10 MG/1
TABLET ORAL
Qty: 90 TABLET | Refills: 1 | Status: SHIPPED | OUTPATIENT
Start: 2023-06-09 | End: 2024-01-29

## 2023-07-20 DIAGNOSIS — K21.9 GASTROESOPHAGEAL REFLUX DISEASE WITHOUT ESOPHAGITIS: ICD-10-CM

## 2023-07-20 DIAGNOSIS — F41.9 ANXIETY: ICD-10-CM

## 2023-07-21 RX ORDER — BUSPIRONE HYDROCHLORIDE 15 MG/1
TABLET ORAL
Qty: 90 TABLET | Refills: 0 | Status: SHIPPED | OUTPATIENT
Start: 2023-07-21 | End: 2023-10-13

## 2023-07-21 NOTE — TELEPHONE ENCOUNTER
Pt last seen June 2022 and no future appt.    Call pt and assist in scheduling future appt with me in the next 1 month. OK to use same day, next day, [facility] appt slot that is currently open for future appt with me in clinic. Schedule appt in AM and have pt come in fasting so that labs can be drawn when seen. Pt to be sure to take her usual meds the  AM of the appt and drink water that morning so normally hydrated when seen in clinic. Pt to check blood sugars twice a day (before breakfast and bedtime) for 4 days prior to the appointment with me, write them down and have them available to review with the appointment   Meds refilled for  90 days

## 2023-07-31 ENCOUNTER — VIRTUAL VISIT (OUTPATIENT)
Dept: INTERNAL MEDICINE | Facility: CLINIC | Age: 88
End: 2023-07-31
Payer: MEDICARE

## 2023-07-31 DIAGNOSIS — F32.0 MILD MAJOR DEPRESSION (H): ICD-10-CM

## 2023-07-31 DIAGNOSIS — R35.1 NOCTURIA: ICD-10-CM

## 2023-07-31 DIAGNOSIS — N18.31 TYPE 2 DIABETES MELLITUS WITH STAGE 3A CHRONIC KIDNEY DISEASE, WITHOUT LONG-TERM CURRENT USE OF INSULIN (H): Primary | ICD-10-CM

## 2023-07-31 DIAGNOSIS — G47.00 INSOMNIA, UNSPECIFIED TYPE: ICD-10-CM

## 2023-07-31 DIAGNOSIS — E11.22 TYPE 2 DIABETES MELLITUS WITH STAGE 3A CHRONIC KIDNEY DISEASE, WITHOUT LONG-TERM CURRENT USE OF INSULIN (H): Primary | ICD-10-CM

## 2023-07-31 DIAGNOSIS — E78.5 HYPERLIPIDEMIA LDL GOAL <100: ICD-10-CM

## 2023-07-31 PROCEDURE — 99442 PR PHYSICIAN TELEPHONE EVALUATION 11-20 MIN: CPT | Mod: 95 | Performed by: INTERNAL MEDICINE

## 2023-07-31 NOTE — PROGRESS NOTES
Janet is a 88 year old who is being evaluated via a billable video visit.      How would you like to obtain your AVS? MyChart  If the video visit is dropped, the invitation should be resent by: Text to cell phone: [phone]   Will anyone else be joining your video visit? No          ASSESSMENT:    1. Type 2 diabetes mellitus with stage 3a chronic kidney disease, without long-term current use of insulin (H)  Controlled.  Continue current management and repeat labs in October  - Hemoglobin A1c; Future  - Albumin Random Urine Quantitative with Creat Ratio; Future  - Comprehensive metabolic panel; Future    2. Nocturia  Secondary to caffeine/alcohol and possible weakened bladder musculature.  Patient to reduce coffee and wine intake.  Patient states symptoms not bothersome enough that she wishes to consider anticholinergic medication.  Counseled to empty bladder before bedtime    3. Insomnia, unspecified type   Secondary to #2 mostly.  Patient denies worrying at night. Able to fall asleep OK. Denies need for insomnia-specific medication    4. Mild major depression (H)  Pt grieving loss of  and dog in past 3 mos but states mood overall Ok and denies current depression. Njots time in her appt. Continue current medications. Declines need for med adjustment with Lexapro    5. HYPERLIPIDEMIA LDL GOAL <100  On statin and at goal previous. Due for repeat lipids in October.   - Comprehensive metabolic panel; Future  - Lipid panel reflex to direct LDL Fasting; Future      PLAN:  Continue current medications and diet   Reduce caffeine.coffee intake to 1 cup/day and limit wine to 2 glasses/day max oe less to lessen urine frequency at night  Be sure to empty bladder before bedtime   Limit other fluid intake also after 6pm  If urine symptoms remain bothersome and interrupting sleep at night, then let me know and will prescribe trial of Trospium oral medication to help you retain urine in the bladder over night  Fasting  blood and urine labs 10/11/23 and see me a few days later as scheduled to review la results and other medical issues      Phone call contact time  Call Started at 11:46am  Call Ended at 11:59am  Total minutes: 13 min    (Chart documentation was completed, in part, with [employer] voice-recognition software. Even though reviewed, some grammatical, spelling, and word errors may remain.)    Frank Kovacs MD  Internal Medicine Department  [facility] MARGARITA Gonzales is a 88 year old, presenting for the following health issues:  Insomnia      HPI     Diabetes Follow-up    How often are you checking your blood sugar? Not at all  What concerns do you have today about your diabetes? None   Do you have any of these symptoms? (Select all that apply)  No numbness or tingling in feet.  No redness, sores or blisters on feet.  No complaints of excessive thirst.  No reports of blurry vision.  No significant changes to weight.  Have you had a diabetic eye exam in the last 12 months? No            Hyperlipidemia Follow-Up    Are you regularly taking any medication or supplement to lower your cholesterol?   Yes- atorvastatin  Are you having muscle aches or other side effects that you think could be caused by your cholesterol lowering medication?  No    Hypertension Follow-up    Do you check your blood pressure regularly outside of the clinic? No   Are you following a low salt diet? Yes  Are your blood pressures ever more than 140 on the top number (systolic) OR more   than 90 on the bottom number (diastolic), for example 140/90? No    BP Readings from Last 2 Encounters:   06/03/22 132/64   04/08/22 132/80     Hemoglobin A1C (%)   Date Value   04/14/2023 5.5   10/06/2022 5.4   04/01/2021 5.6   01/23/2020 5.6     LDL Cholesterol Calculated (mg/dL)   Date Value   10/06/2022 84   09/25/2021 76   10/19/2020 83   10/08/2019 88        Most recent lab results reviewed with pt.      Component       Latest Ref Rng 10/6/2022  9:10 AM 10/6/2022  9:41 AM   Sodium      136 - 145 mmol/L 139     Potassium      3.4 - 5.3 mmol/L 4.0     Chloride      94 - 109 mmol/L 104     Carbon Dioxide      20 - 32 mmol/L 27     Anion Gap      7 - 15 mmol/L 8     Urea Nitrogen      7 - 30 mg/dL 27     Creatinine      0.51 - 0.95 mg/dL 1.12 (H)     Calcium      8.8 - 10.2 mg/dL 9.5     Glucose      70 - 99 mg/dL 108 (H)     Alkaline Phosphatase      40 - 150 U/L 84     AST      0 - 45 U/L 22     ALT      0 - 50 U/L 23     Protein Total      6.8 - 8.8 g/dL 8.2     Albumin      3.4 - 5.0 g/dL 3.8     Bilirubin Total      0.2 - 1.3 mg/dL 0.5     GFR Estimate      >60 mL/min/1.73m2 47 (L)     Potassium      3.4 - 5.3 mmol/L     Chloride      98 - 107 mmol/L     Carbon Dioxide (CO2)      22 - 29 mmol/L     Urea Nitrogen      8.0 - 23.0 mg/dL     Glucose      70 - 99 mg/dL     Cholesterol      <200 mg/dL 184     Triglycerides      <150 mg/dL 93     HDL Cholesterol      >=50 mg/dL 81     LDL Cholesterol Calculated      <=100 mg/dL 84     Non HDL Cholesterol      <130 mg/dL 103     Patient Fasting? Yes     Creatinine Urine      mg/dL  251    Albumin Urine mg/L      mg/L  42    Albumin Urine mg/g Cr      0.00 - 25.00 mg/g Cr  16.73    Hemoglobin A1C      0.0 - 5.6 % 5.4     Hemoglobin      11.7 - 15.7 g/dL 11.9       Component      Latest Ref Rn 4/14/2023  9:06 AM   Sodium      136 - 145 mmol/L 142    Potassium      3.4 - 5.3 mmol/L    Chloride      94 - 109 mmol/L    Carbon Dioxide      20 - 32 mmol/L    Anion Gap      7 - 15 mmol/L 14    Urea Nitrogen      7 - 30 mg/dL    Creatinine      0.51 - 0.95 mg/dL 0.95    Calcium      8.8 - 10.2 mg/dL 9.6    Glucose      70 - 99 mg/dL    Alkaline Phosphatase      40 - 150 U/L    AST      0 - 45 U/L    ALT      0 - 50 U/L    Protein Total      6.8 - 8.8 g/dL    Albumin      3.4 - 5.0 g/dL    Bilirubin Total      0.2 - 1.3 mg/dL    GFR Estimate      >60 mL/min/1.73m2 57 (L)    Potassium      3.4 -  "5.3 mmol/L 4.4    Chloride      98 - 107 mmol/L 103    Carbon Dioxide (CO2)      22 - 29 mmol/L 25    Urea Nitrogen      8.0 - 23.0 mg/dL 21.6    Glucose      70 - 99 mg/dL 109 (H)    Cholesterol      <200 mg/dL    Triglycerides      <150 mg/dL    HDL Cholesterol      >=50 mg/dL    LDL Cholesterol Calculated      <=100 mg/dL    Non HDL Cholesterol      <130 mg/dL    Patient Fasting?    Creatinine Urine      mg/dL    Albumin Urine mg/L      mg/L    Albumin Urine mg/g Cr      0.00 - 25.00 mg/g Cr    Hemoglobin A1C      0.0 - 5.6 % 5.5    Hemoglobin      11.7 - 15.7 g/dL        Denies CP, SOB, abdominal pain, polyuria,   vision changes, extremity numbness/parasthesias or skin problems.  Drinks 1 cup of coffee twice a day and 4 glasses of wine in the afternoon/evening.  Dog passed away 1 week ago and  passed away 3 months ago.  Still grieving but patient states she is overall happy in her apartment and not depressed.  Had a URI about a week ago with some daytime coughing but that has resolved and she is \"so much better\".  No fevers or chills.  No COVID testing done at that time  Also sleep okay but often wakes up about 2 hours later to urinate.  Denies dysuria or hematuria.  No known snoring.  Occasional nap during the day           Additional ROS:   Constitutional, HEENT, Cardiovascular, Pulmonary, GI and , Neuro, MSK and Psych review of systems/symptoms are otherwise negative or unchanged from previous, except as noted above.           Objective:  Any reported vitals from today were reviewed in chart. See nursing documentation for details    RESP: No cough, no audible wheezing, able to talk in full sentences  GEN: Normal affect. Normal though content/speech  Remainder of exam unable to be completed due to telephone visits       "

## 2023-08-01 NOTE — PATIENT INSTRUCTIONS
PLAN:  Continue current medications and diet   Reduce caffeine.coffee intake to 1 cup/day and limit wine to 2 glasses/day max oe less to lessen urine frequency at night  Be sure to emplty bladdder before bedtime   Limit other fluid intake also after 6pm  If urine symptoms remain bothersome and interrupting sleep at night, then let me know and will prescribe trial of Trospium oral medication to help you retain urine in the bladder over night  Fasting blood and urine labs 10/11/23 and see me a few days later as scheduled to review la results and other medical issues

## 2023-08-27 ENCOUNTER — HEALTH MAINTENANCE LETTER (OUTPATIENT)
Age: 88
End: 2023-08-27

## 2023-10-05 DIAGNOSIS — E78.5 HYPERLIPIDEMIA LDL GOAL <100: ICD-10-CM

## 2023-10-06 RX ORDER — ATORVASTATIN CALCIUM 20 MG/1
20 TABLET, FILM COATED ORAL DAILY
Qty: 90 TABLET | Refills: 0 | Status: SHIPPED | OUTPATIENT
Start: 2023-10-06 | End: 2024-01-07

## 2023-10-11 ENCOUNTER — LAB (OUTPATIENT)
Dept: LAB | Facility: CLINIC | Age: 88
End: 2023-10-11
Payer: MEDICARE

## 2023-10-11 ENCOUNTER — IMMUNIZATION (OUTPATIENT)
Dept: NURSING | Facility: CLINIC | Age: 88
End: 2023-10-11
Payer: MEDICARE

## 2023-10-11 DIAGNOSIS — E11.22 TYPE 2 DIABETES MELLITUS WITH STAGE 3A CHRONIC KIDNEY DISEASE, WITHOUT LONG-TERM CURRENT USE OF INSULIN (H): ICD-10-CM

## 2023-10-11 DIAGNOSIS — E78.5 HYPERLIPIDEMIA LDL GOAL <100: ICD-10-CM

## 2023-10-11 DIAGNOSIS — N18.31 TYPE 2 DIABETES MELLITUS WITH STAGE 3A CHRONIC KIDNEY DISEASE, WITHOUT LONG-TERM CURRENT USE OF INSULIN (H): ICD-10-CM

## 2023-10-11 LAB
CHOLEST SERPL-MCNC: 183 MG/DL
CREAT UR-MCNC: 242 MG/DL
HBA1C MFR BLD: 5.6 % (ref 0–5.6)
HDLC SERPL-MCNC: 65 MG/DL
LDLC SERPL CALC-MCNC: 104 MG/DL
MICROALBUMIN UR-MCNC: 65.8 MG/L
MICROALBUMIN/CREAT UR: 27.19 MG/G CR (ref 0–25)
NONHDLC SERPL-MCNC: 118 MG/DL
TRIGL SERPL-MCNC: 72 MG/DL

## 2023-10-11 PROCEDURE — G0008 ADMIN INFLUENZA VIRUS VAC: HCPCS | Mod: 59

## 2023-10-11 PROCEDURE — 82570 ASSAY OF URINE CREATININE: CPT

## 2023-10-11 PROCEDURE — 91320 SARSCV2 VAC 30MCG TRS-SUC IM: CPT

## 2023-10-11 PROCEDURE — 90662 IIV NO PRSV INCREASED AG IM: CPT

## 2023-10-11 PROCEDURE — 90480 ADMN SARSCOV2 VAC 1/ONLY CMP: CPT

## 2023-10-11 PROCEDURE — 36415 COLL VENOUS BLD VENIPUNCTURE: CPT

## 2023-10-11 PROCEDURE — 83036 HEMOGLOBIN GLYCOSYLATED A1C: CPT

## 2023-10-11 PROCEDURE — 80061 LIPID PANEL: CPT

## 2023-10-11 PROCEDURE — 82043 UR ALBUMIN QUANTITATIVE: CPT

## 2023-10-13 ENCOUNTER — OFFICE VISIT (OUTPATIENT)
Dept: INTERNAL MEDICINE | Facility: CLINIC | Age: 88
End: 2023-10-13
Payer: MEDICARE

## 2023-10-13 ENCOUNTER — DOCUMENTATION ONLY (OUTPATIENT)
Dept: LAB | Facility: CLINIC | Age: 88
End: 2023-10-13

## 2023-10-13 VITALS
SYSTOLIC BLOOD PRESSURE: 132 MMHG | DIASTOLIC BLOOD PRESSURE: 80 MMHG | BODY MASS INDEX: 29.59 KG/M2 | HEART RATE: 94 BPM | HEIGHT: 64 IN | WEIGHT: 173.3 LBS | TEMPERATURE: 98.4 F | OXYGEN SATURATION: 96 %

## 2023-10-13 DIAGNOSIS — Z00.00 MEDICARE ANNUAL WELLNESS VISIT, SUBSEQUENT: Primary | ICD-10-CM

## 2023-10-13 DIAGNOSIS — R80.9 PROTEINURIA, UNSPECIFIED TYPE: ICD-10-CM

## 2023-10-13 DIAGNOSIS — F10.10 EXCESSIVE DRINKING ALCOHOL: ICD-10-CM

## 2023-10-13 DIAGNOSIS — F32.0 MILD MAJOR DEPRESSION (H): ICD-10-CM

## 2023-10-13 DIAGNOSIS — E11.22 TYPE 2 DIABETES MELLITUS WITH STAGE 3A CHRONIC KIDNEY DISEASE, WITHOUT LONG-TERM CURRENT USE OF INSULIN (H): ICD-10-CM

## 2023-10-13 DIAGNOSIS — N18.31 TYPE 2 DIABETES MELLITUS WITH STAGE 3A CHRONIC KIDNEY DISEASE, WITHOUT LONG-TERM CURRENT USE OF INSULIN (H): ICD-10-CM

## 2023-10-13 DIAGNOSIS — N18.31 STAGE 3A CHRONIC KIDNEY DISEASE (H): ICD-10-CM

## 2023-10-13 DIAGNOSIS — E78.5 HYPERLIPIDEMIA LDL GOAL <100: ICD-10-CM

## 2023-10-13 PROCEDURE — G0439 PPPS, SUBSEQ VISIT: HCPCS | Performed by: INTERNAL MEDICINE

## 2023-10-13 PROCEDURE — 99214 OFFICE O/P EST MOD 30 MIN: CPT | Mod: 25 | Performed by: INTERNAL MEDICINE

## 2023-10-13 ASSESSMENT — ENCOUNTER SYMPTOMS
PARESTHESIAS: 0
JOINT SWELLING: 0
SHORTNESS OF BREATH: 0
MYALGIAS: 0
EYE PAIN: 0
ARTHRALGIAS: 0
COUGH: 1
DIZZINESS: 0
FREQUENCY: 1
ABDOMINAL PAIN: 0
NAUSEA: 0
BREAST MASS: 0
WEAKNESS: 0
HEMATURIA: 0
DYSURIA: 0
PALPITATIONS: 0
HEARTBURN: 0
CHILLS: 1
HEADACHES: 0
NERVOUS/ANXIOUS: 0
DIARRHEA: 0
FEVER: 0
SORE THROAT: 0
CONSTIPATION: 0
HEMATOCHEZIA: 0

## 2023-10-13 ASSESSMENT — ACTIVITIES OF DAILY LIVING (ADL): CURRENT_FUNCTION: NO ASSISTANCE NEEDED

## 2023-10-13 NOTE — PROGRESS NOTES
The add-on test PTHI that was requested on 10/13/2023 is unable to be completed due to age of sample, must be processed within 24 hours. Future order is available for collection. If labs are needed before next appointment, please arrange for collection.

## 2023-10-13 NOTE — PATIENT INSTRUCTIONS
RSV vaccine next week at pharmacy   Tetanus - TD vaccine  in November  at pharmacy  Continue current medications   Additional lab orders today (added to blood recently drawn)   Call  [phone] or use [employer] to schedule a future lab appointment  fasting  in 6 months.   See me a few days later in clinic to review results and follow-up on medical issues or earlier as needed  Would recommend limiting alcohol intake to 1 glass of wine per day or less. Will help urine frequency and reduce risk for falls, etc  Pt was informed regarding extra E&M billing for management of new or established medical issues not related to today's wellness/screening visit    Patient Education   Personalized Prevention Plan  You are due for the preventive services outlined below.  Your care team is available to assist you in scheduling these services.  If you have already completed any of these items, please share that information with your care team to update in your medical record.  Health Maintenance Due   Topic Date Due    Hepatitis B Vaccine (1 of 3 - Risk 3-dose series) Never done    RSV VACCINE 60+ (1 - 1-dose 60+ series) Never done    Diabetic Foot Exam  10/16/2019    Parathyroid Lab  09/25/2022    Phosphorous Lab  09/25/2022    Vitamin D Level  09/25/2022    ANNUAL REVIEW OF HM ORDERS  09/30/2022    Eye Exam  10/06/2022    Diptheria Tetanus Pertussis (DTAP/TDAP/TD) Vaccine (2 - Td or Tdap) 05/24/2023    Annual Wellness Visit  06/03/2023    Liver Monitoring Lab  10/06/2023    Hemoglobin  10/06/2023    Basic Metabolic Panel  10/14/2023      Patient Education   Alcohol Use     Many people can enjoy a glass of wine or beer without any negative consequences to their health. According to the Centers for Disease Control and Prevention (CDC), having one or fewer drinks per day for women and two or fewer per day for men is considered moderate drinking.     When people drink more than moderately, it can become concerning. Excessive drinking  is defined as consuming 15 drinks or more per week for men and 8 drinks or more per week for women. There are various health problems associated with excessive drinking, which include:  Damage to vital organs like the heart, brain, liver and pancreas  Harm to the digestive tract  Weaken the immune system  Higher risk for heart disease and cancer    There are many resources available to people who are addicted to alcohol. A counselor or other health care provider can give you support. So can a , , or rabbi who is trained in substance abuse counseling. Friends and family may also help once you are connected with professionals.       Learning About Dietary Guidelines  What are the Dietary Guidelines for Americans?     Dietary Guidelines for Americans provide tips for eating well and staying healthy. This helps reduce the risk for long-term (chronic) diseases.  These guidelines recommend that you:  Eat and drink the right amount for you. The [place] government's food guide is called MyPlate. It can help you make your own well-balanced eating plan.  Try to balance your eating with your activity. This helps you stay at a healthy weight.  Drink alcohol in moderation, if at all.  Limit foods high in salt, saturated fat, trans fat, and added sugar.  These guidelines are from the [place] Department of Agriculture and the [place] Department of Health and Human Services. They are updated every 5 years.  What is MyPlate?  MyPlate is the [place] government's food guide. It can help you make your own well-balanced eating plan. A balanced eating plan means that you eat enough, but not too much, and that your food gives you the nutrients you need to stay healthy.  MyPlate focuses on eating plenty of whole grains, fruits, and vegetables, and on limiting fat and sugar. It is available online at www.ChooseMyPlate.gov.  How can you get started?  If you're trying to eat healthier, you can slowly change your eating habits over time. You  "don't have to make big changes all at once. Start by adding one or two healthy foods to your meals each day.  Grains  Choose whole-grain breads and cereals and whole-wheat pasta and whole-grain crackers.  Vegetables  Eat a variety of vegetables every day. They have lots of nutrients and are part of a heart-healthy diet.  Fruits  Eat a variety of fruits every day. Fruits contain lots of nutrients. Choose fresh fruit instead of fruit juice.  Protein foods  Choose fish and lean poultry more often. Eat red meat and fried meats less often. Dried beans, tofu, and nuts are also good sources of protein.  Dairy  Choose low-fat or fat-free products from this food group. If you have problems digesting milk, try eating cheese or yogurt instead.  Fats and oils  Limit fats and oils if you're trying to cut calories. Choose healthy fats when you cook. These include canola oil and olive oil.  Where can you learn more?  Go to https://www.[employer].net/patiented  Enter D676 in the search box to learn more about \"Learning About Dietary Guidelines.\"  Current as of: March 1, 2023               Content Version: 13.7    4026-2388 [employer].   Care instructions adapted under license by your healthcare professional. If you have questions about a medical condition or this instruction, always ask your healthcare professional. [employer] disclaims any warranty or liability for your use of this information.      Bladder Training: Care Instructions  Your Care Instructions     Bladder training is used to treat urge incontinence and stress incontinence. Urge incontinence means that the need to urinate comes on so fast that you can't get to a toilet in time. Stress incontinence means that you leak urine because of pressure on your bladder. For example, it may happen when you laugh, cough, or lift something heavy.  Bladder training can increase how long you can wait before you have to urinate. It can also help your " bladder hold more urine. And it can give you better control over the urge to urinate.  It is important to remember that bladder training takes a few weeks to a few months to make a difference. You may not see results right away, but don't give up.  Follow-up care is a key part of your treatment and safety. Be sure to make and go to all appointments, and call your doctor if you are having problems. It's also a good idea to know your test results and keep a list of the medicines you take.  How can you care for yourself at home?  Work with your doctor to come up with a bladder training program that is right for you. You may use one or more of the following methods.  Delayed urination  In the beginning, try to keep from urinating for 5 minutes after you first feel the need to go.  While you wait, take deep, slow breaths to relax. Kegel exercises can also help you delay the need to go to the bathroom.  After some practice, when you can easily wait 5 minutes to urinate, try to wait 10 minutes before you urinate.  Slowly increase the waiting period until you are able to control when you have to urinate.  Scheduled urination  Empty your bladder when you first wake up in the morning.  Schedule times throughout the day when you will urinate.  Start by going to the bathroom every hour, even if you don't need to go.  Slowly increase the time between trips to the bathroom.  When you have found a schedule that works well for you, keep doing it.  If you wake up during the night and have to urinate, do it. Apply your schedule to waking hours only.  Kegel exercises  These tighten and strengthen pelvic muscles, which can help you control the flow of urine. (If doing these exercises causes pain, stop doing them and talk with your doctor.) To do Kegel exercises:  Squeeze your muscles as if you were trying not to pass gas. Or squeeze your muscles as if you were stopping the flow of urine. Your belly, legs, and buttocks shouldn't  "move.  Hold the squeeze for 3 seconds, then relax for 5 to 10 seconds.  Start with 3 seconds, then add 1 second each week until you are able to squeeze for 10 seconds.  Repeat the exercise 10 times a session. Do 3 to 8 sessions a day.  When should you call for help?  Watch closely for changes in your health, and be sure to contact your doctor if:    Your incontinence is getting worse.     You do not get better as expected.   Where can you learn more?  Go to https://www.[employer].net/patiented  Enter V684 in the search box to learn more about \"Bladder Training: Care Instructions.\"  Current as of: March 1, 2023               Content Version: 13.7    4002-4425 [employer].   Care instructions adapted under license by your healthcare professional. If you have questions about a medical condition or this instruction, always ask your healthcare professional. [employer] disclaims any warranty or liability for your use of this information.      Learning About Depression Screening  What is depression screening?  Depression screening is a way to see if you have depression symptoms. It may be done by a doctor or counselor. It's often part of a routine checkup. That's because your mental health is just as important as your physical health.  Depression is a mental health condition that affects how you feel, think, and act. You may:  Have less energy.  Lose interest in your daily activities.  Feel sad and grouchy for a long time.  Depression is very common. It affects people of all ages.  Many things can lead to depression. Some people become depressed after they have a stroke or find out they have a major illness like cancer or heart disease. The death of a loved one or a breakup may lead to depression. It can run in families. Most experts believe that a combination of inherited genes and stressful life events can cause it.  What happens during screening?  You may be asked to fill out a form about your " "depression symptoms. You and the doctor will discuss your answers. The doctor may ask you more questions to learn more about how you think, act, and feel.  What happens after screening?  If you have symptoms of depression, your doctor will talk to you about your options.  Doctors usually treat depression with medicines or counseling. Often, combining the two works best. Many people don't get help because they think that they'll get over the depression on their own. But people with depression may not get better unless they get treatment.  The cause of depression is not well understood. There may be many factors involved. But if you have depression, it's not your fault.  A serious symptom of depression is thinking about death or suicide. If you or someone you care about talks about this or about feeling hopeless, get help right away.  It's important to know that depression can be treated. Medicine, counseling, and self-care may help.  Where can you learn more?  Go to https://www.[address].net/patiented  Enter T185 in the search box to learn more about \"Learning About Depression Screening.\"  Current as of: October 20, 2022               Content Version: 13.7    1832-8235 [employer].   Care instructions adapted under license by your healthcare professional. If you have questions about a medical condition or this instruction, always ask your healthcare professional. [employer] disclaims any warranty or liability for your use of this information.         "

## 2023-10-13 NOTE — PROGRESS NOTES
"SUBJECTIVE:   Janet is a 89 year old who presents for Preventive Visit inflammatory diabetes, CKD, hypertension, depression      Are you in the first 12 months of your Medicare coverage?  No    Healthy Habits:     In general, how would you rate your overall health?  Excellent    Frequency of exercise:  4-5 days/week    Duration of exercise:  Less than 15 minutes    Do you usually eat at least 4 servings of fruit and vegetables a day, include whole grains    & fiber and avoid regularly eating high fat or \"junk\" foods?  No    Taking medications regularly:  Yes    Medication side effects:  None    Ability to successfully perform activities of daily living:  No assistance needed    Home Safety:  No safety concerns identified    Hearing Impairment:  No hearing concerns    In the past 6 months, have you been bothered by leaking of urine? Yes    In general, how would you rate your overall mental or emotional health?  Excellent    Additional concerns today:  No      Today's PHQ-9 Score:       10/13/2023    10:04 AM   PHQ-9 SCORE   PHQ-9 Total Score MyChart 7 (Mild depression)   PHQ-9 Total Score 7       Have you ever done Advance Care Planning? (For example, a Health Directive, POLST, or a discussion with a medical provider or your loved ones about your wishes): No, advance care planning information given to patient to review.  Patient plans to discuss their wishes with loved ones or provider.         Fall risk  Fallen 2 or more times in the past year?: No  Any fall with injury in the past year?: No    Cognitive Screening   1) Repeat 3 items (Leader, Season, Table)    2) Clock draw: NORMAL  3) 3 item recall: Recalls 2 objects   Results: NORMAL clock, 1-2 items recalled: COGNITIVE IMPAIRMENT LESS LIKELY    Mini-CogTM Copyright SORAYA Plata. Licensed by the author for use in [facility]; reprinted with permission (brown@.[facility]). All rights reserved.      Do you have sleep apnea, excessive snoring or daytime " drowsiness? : no    Reviewed and updated as needed this visit by clinical staff                  Reviewed and updated as needed this visit by Provider                 Social History     Tobacco Use    Smoking status: Former     Types: Cigarettes     Quit date: 10/10/1981     Years since quittin.1    Smokeless tobacco: Never   Substance Use Topics    Alcohol use: Yes     Alcohol/week: 21.0 - 28.0 standard drinks of alcohol     Types: 21 - 28 Glasses of wine per week     Comment: 3-4 glass of wine daily            10/13/2023    10:18 AM   Alcohol Use   Prescreen: >3 drinks/day or >7 drinks/week? Yes   AUDIT SCORE  11         10/13/2023    10:18 AM   AUDIT - Alcohol Use Disorders Identification Test - Reproduced from the World Health Organization Audit 2001 (Second Edition)   1.  How often do you have a drink containing alcohol? 4 or more times a week   2.  How many drinks containing alcohol do you have on a typical day when you are drinking? 3 or 4   3.  How often do you have five or more drinks on one occasion? Less than monthly   4.  How often during the last year have you found that you were not able to stop drinking once you had started? Less than monthly   5.  How often during the last year have you failed to do what was normally expected of you because of drinking? Never   6.  How often during the last year have you needed a first drink in the morning to get yourself going after a heavy drinking session? Never   7.  How often during the last year have you had a feeling of guilt or remorse after drinking? Never   8.  How often during the last year have you been unable to remember what happened the night before because of your drinking? Never   9.  Have you or someone else been injured because of your drinking? No   10. Has a relative, friend, doctor or other health care worker been concerned about your drinking or suggested you cut down? Yes, during the last year   TOTAL SCORE 11     Do you have a current  opioid prescription? No  Do you use any other controlled substances or medications that are not prescribed by a provider? None            Current providers sharing in care for this patient include:   Patient Care Team:  Frank Kovacs MD as PCP - General  Frank Kovacs MD as Assigned PCP    The following health maintenance items are reviewed in Epic and correct as of today:  Health Maintenance   Topic Date Due    HEPATITIS B IMMUNIZATION (1 of 3 - Risk 3-dose series) Never done    RSV VACCINE 60+ (1 - 1-dose 60+ series) Never done    DIABETIC FOOT EXAM  10/16/2019    PARATHYROID  09/25/2022    PHOSPHORUS  09/25/2022    VITAMIN D  09/25/2022    ANNUAL REVIEW OF HM ORDERS  09/30/2022    EYE EXAM  10/06/2022    DTAP/TDAP/TD IMMUNIZATION (2 - Td or Tdap) 05/24/2023    MEDICARE ANNUAL WELLNESS VISIT  06/03/2023    ALT  10/06/2023    HEMOGLOBIN  10/06/2023    BMP  10/14/2023    A1C  04/11/2024    PHQ-9  04/13/2024    LIPID  10/11/2024    MICROALBUMIN  10/11/2024    FALL RISK ASSESSMENT  10/13/2024    ADVANCE CARE PLANNING  06/04/2027    DEPRESSION ACTION PLAN  Completed    INFLUENZA VACCINE  Completed    Pneumococcal Vaccine: 65+ Years  Completed    URINALYSIS  Completed    ZOSTER IMMUNIZATION  Completed    COVID-19 Vaccine  Completed    IPV IMMUNIZATION  Aged Out    HPV IMMUNIZATION  Aged Out    MENINGITIS IMMUNIZATION  Aged Out    COLORECTAL CANCER SCREENING  Discontinued     Labs reviewed in EPIC    Component      Latest Ref Rng 4/8/2022  10:48 AM 10/6/2022  9:10 AM 10/6/2022  9:41 AM 4/14/2023  9:06 AM 10/11/2023  8:58 AM   Sodium      136 - 145 mmol/L  139   142     Potassium      3.4 - 5.3 mmol/L  4.0       Chloride      94 - 109 mmol/L  104       Carbon Dioxide      20 - 32 mmol/L  27       Anion Gap      7 - 15 mmol/L  8   14     Urea Nitrogen      7 - 30 mg/dL  27       Creatinine      0.51 - 0.95 mg/dL  1.12 (H)   0.95     Calcium      8.8 - 10.2 mg/dL  9.5   9.6     Glucose      70 - 99 mg/dL  108 (H)        Alkaline Phosphatase      40 - 150 U/L  84       AST      0 - 45 U/L  22       ALT      0 - 50 U/L  23       Protein Total      6.8 - 8.8 g/dL  8.2       Albumin      3.4 - 5.0 g/dL  3.8       Bilirubin Total      0.2 - 1.3 mg/dL  0.5       GFR Estimate      >60 mL/min/1.73m2  47 (L)   57 (L)     Potassium      3.4 - 5.3 mmol/L    4.4     Chloride      98 - 107 mmol/L    103     Carbon Dioxide (CO2)      22 - 29 mmol/L    25     Urea Nitrogen      8.0 - 23.0 mg/dL    21.6     Glucose      70 - 99 mg/dL    109 (H)     Cholesterol      <200 mg/dL  184    183    Triglycerides      <150 mg/dL  93    72    HDL Cholesterol      >=50 mg/dL  81    65    LDL Cholesterol Calculated      <=100 mg/dL  84    104 (H)    Non HDL Cholesterol      <130 mg/dL  103    118    Patient Fasting?  Yes       Creatinine Urine      mg/dL 193   251      Albumin Urine mg/L      mg/L 119   42      Albumin Urine mg/g Cr      0.00 - 25.00 mg/g Cr 61.66 (H)   16.73      Albumin Urine mg/L      mg/L        Albumin Urine mg/g Cr      0.00 - 25.00 mg/g Cr        Hemoglobin A1C      0.0 - 5.6 %  5.4   5.5  5.6    Hemoglobin      11.7 - 15.7 g/dL  11.9         Component      [facility] Ref Rng 10/11/2023  9:49 AM   Sodium      136 - 145 mmol/L    Potassium      3.4 - 5.3 mmol/L    Chloride      94 - 109 mmol/L    Carbon Dioxide      20 - 32 mmol/L    Anion Gap      7 - 15 mmol/L    Urea Nitrogen      7 - 30 mg/dL    Creatinine      0.51 - 0.95 mg/dL    Calcium      8.8 - 10.2 mg/dL    Glucose      70 - 99 mg/dL    Alkaline Phosphatase      40 - 150 U/L    AST      0 - 45 U/L    ALT      0 - 50 U/L    Protein Total      6.8 - 8.8 g/dL    Albumin      3.4 - 5.0 g/dL    Bilirubin Total      0.2 - 1.3 mg/dL    GFR Estimate      >60 mL/min/1.73m2    Potassium      3.4 - 5.3 mmol/L    Chloride      98 - 107 mmol/L    Carbon Dioxide (CO2)      22 - 29 mmol/L    Urea Nitrogen      8.0 - 23.0 mg/dL    Glucose      70 - 99 mg/dL    Cholesterol      <200 mg/dL     Triglycerides      <150 mg/dL    HDL Cholesterol      >=50 mg/dL    LDL Cholesterol Calculated      <=100 mg/dL    Non HDL Cholesterol      <130 mg/dL    Patient Fasting?    Creatinine Urine      mg/dL 242.0    Albumin Urine mg/L      mg/L    Albumin Urine mg/g Cr      0.00 - 25.00 mg/g Cr    Albumin Urine mg/L      mg/L 65.8    Albumin Urine mg/g Cr      0.00 - 25.00 mg/g Cr 27.19 (H)    Hemoglobin A1C      0.0 - 5.6 %    Hemoglobin      11.7 - 15.7 g/dL       Legend:  (H) High  (L) Low    Mammogram Screening - Patient over age 75, has elected to discontinue screenings.  Pertinent mammograms are reviewed under the imaging tab.    Review of Systems   Constitutional:  Positive for chills. Negative for fever.   HENT:  Positive for congestion. Negative for ear pain, hearing loss and sore throat.    Eyes:  Positive for visual disturbance. Negative for pain.   Respiratory:  Positive for cough. Negative for shortness of breath.    Cardiovascular:  Negative for chest pain, palpitations and peripheral edema.   Gastrointestinal:  Negative for abdominal pain, constipation, diarrhea, heartburn, hematochezia and nausea.   Breasts:  Negative for tenderness, breast mass and discharge.   Genitourinary:  Positive for frequency. Negative for dysuria, genital sores, hematuria, pelvic pain, urgency, vaginal bleeding and vaginal discharge.   Musculoskeletal:  Negative for arthralgias, joint swelling and myalgias.   Skin:  Negative for rash.   Neurological:  Negative for dizziness, weakness, headaches and paresthesias.   Psychiatric/Behavioral:  Negative for mood changes. The patient is not nervous/anxious.        Sugars AM:  144,132,144,143,112    Had some chills 1 week and had some congested cough last week. No cough  yesterday or today. Breathing well.  No chills no  Has glasses. Poor vision bilateral and worsening. Has macular degeneration bilaterally.    Has urine frequency every  2 hours. No  caffeine. Using a pad.  Declines  "medication therapy for urine frequency  Lives with daughter. Enjoys staying at home. 4 glasses of wine throughout the day and not wanting to reduce. No recent falls    OBJECTIVE:   /80   Pulse 94   Temp 98.4  F (36.9  C) (Temporal)   Ht 1.613 m (5' 3.5\")   Wt 78.6 kg (173 lb 4.8 oz)   SpO2 96%   BMI 30.22 kg/m   Estimated body mass index is 27.04 kg/m  as calculated from the following:    Height as of 6/3/22: 1.638 m (5' 4.5\").    Weight as of 6/3/22: 72.6 kg (160 lb).  Physical Exam  Eye: PERRL, EOMI  HENT: ear canals and TM's normal and nose and mouth without ulcers or lesions   Neck: no adenopathy. Thyroid normal to palpation. No bruits  Pulm: Lungs clear to auscultation   CV: Regular rates and rhythm. 1/6 LUIS ENRIQUE apex  GI: Soft, nontender, Normal active bowel sounds, No hepatosplenomegaly or masses palpable  Ext: Peripheral pulses intact. No edema.  Neuro: Normal strength and tone, sensory exam grossly normal.  Stable gait with walker  MSK: DIP and PIP joints of hands with osteoarthritic thickening.  No increased warmth/erythema  Gen: Pleasant affect  diagmed    Patient has been advised of split billing requirements and indicates understanding: Yes      COUNSELING:  Reviewed preventive health counseling, as reflected in patient instructions        She reports that she quit smoking about 42 years ago. She has never used smokeless tobacco.      Appropriate preventive services were discussed with this patient, including applicable screening as appropriate for fall prevention, nutrition, physical activity, Tobacco-use cessation, weight loss and cognition.  Checklist reviewing preventive services available has been given to the patient.    Reviewed patients plan of care and provided an AVS. The Basic Care Plan (routine screening as documented in Health Maintenance) for Janet meets the Care Plan requirement. This Care Plan has been established and reviewed with the Patient and daughter.    ASSESSMENT:   1. " Medicare annual wellness visit, subsequent  See plan section below regarding healthcare maintenance recommendations.  Otherwise up-to-date for age    2. Type 2 diabetes mellitus with stage 3a chronic kidney disease, without long-term current use of insulin (H)  Well-controlled with diet.  Continue current management.  Repeat lab 6 months  - Comprehensive metabolic panel; Future  - Hemoglobin A1c; Future  -FOOT EXAM    3. Hyperlipidemia LDL goal <100  LDL minimally above goal.  Continue current therapy.  Reduce saturated fats in diet.  Repeat labs 6 months  - Comprehensive metabolic panel; Future  - Lipid panel reflex to direct LDL Fasting; Future    4. Stage 3a chronic kidney disease (H)  Previously stable.  Due for labs.  We will add BMP and PTH lab orders to blood already recently drawn.  Otherwise repeat labs 6 months  - Basic metabolic panel; Future  - Parathyroid Hormone Intact; Future  - Comprehensive metabolic panel; Future  - CBC with platelets; Future    5. Proteinuria, unspecified type  Minimal urine protein elevation.  On losartan.  Repeat lab 6 months.  If remains elevated, will increase losartan dosage  - Albumin Random Urine Quantitative with Creat Ratio; Future    6. Mild major depression (H24)  On escitalopram.  PHQ-9 mildly above goal.  Patient declines need for medication adjustment.  Wishes to continue same dosage.  No suicidal ideation.  Patient states she is overall very content with her current life    7. Excessive drinking alcohol  Drinking 4 glasses of wine per day.  No falls.  Explained to patient this is likely contributing to some of her urine frequency.  Counseled patient to have 1 drink per day or less.  Patient states she is content being at home and enjoys drinking some wine and at her age, feels she should be able to make decisions regarding this.  Patient understanding that fall risk increases along with other complications.  She will consider reduction  - Comprehensive metabolic  panel; Future           PLAN:    RSV vaccine next week at pharmacy   Tetanus - TD vaccine  in November  at pharmacy  Continue current medications   Additional lab orders today (added to blood recently drawn)   Call  [phone] or use [facility] to schedule a future lab appointment  fasting  in 6 months.   See me a few days later in clinic to review results and follow-up on medical issues or earlier as needed  Would recommend limiting alcohol intake to 1 glass of wine per day or less. Will help urine frequency and reduce risk for falls, etc. Pt declines any treatment need.   Pt was informed regarding extra E&M billing for management of new or established medical issues not related to today's wellness/screening visit      Frank Kovacs MD  [facility]    Identified Health Risks:  I have reviewed Opioid Use Disorder and Substance Use Disorder risk factors and  NA      Answers submitted by the patient for this visit:  Patient Health Questionnaire (Submitted on 10/13/2023)  If you checked off any problems, how difficult have these problems made it for you to do your work, take care of things at home, or get along with other people?: Not difficult at all  PHQ9 TOTAL SCORE: 7    The patient reports that she drinks more than one alcoholic drink per day and sometimes engages in binge or excessive drinking. She was counseled and given information about possible harmful effects of excessive alcohol intake as well as where to get help for alcohol problems.  The patient was counseled and encouraged to consider modifying their diet and eating habits. She was provided with information on recommended healthy diet options.  Information on urinary incontinence and treatment options given to patient.  The patient's PHQ-9 score is consistent with mild depression. She was provided with information regarding depression and was advised to schedule a follow up appointment in 6 months to further address this  issue.

## 2023-11-02 DIAGNOSIS — E11.22 TYPE 2 DIABETES MELLITUS WITH DIABETIC CHRONIC KIDNEY DISEASE (H): ICD-10-CM

## 2023-11-02 RX ORDER — BLOOD SUGAR DIAGNOSTIC
STRIP MISCELLANEOUS
Qty: 100 STRIP | Refills: 1 | Status: SHIPPED | OUTPATIENT
Start: 2023-11-02 | End: 2023-12-05

## 2023-11-02 NOTE — TELEPHONE ENCOUNTER
Medication Question or Refill        What medication are you calling about (include dose and sig)?: Pended    Preferred Pharmacy:   [id number] - [place], [place] - [address] AT [facility] [address]  [address]  [facility] [address]  Phone: [phone] Fax: [phone]      Controlled Substance Agreement on file:   CSA -- Patient Level:    CSA: None found at the patient level.       Who prescribed the medication?: PCP    Do you need a refill? Yes    When did you use the medication last? N/A    Patient offered an appointment? No    Do you have any questions or concerns?  No      Could we send this information to you in [employer] or would you prefer to receive a phone call?:   Patient would prefer a phone call   Okay to leave a detailed message?: Yes at Cell number on file:    Telephone Information:   Mobile [phone]

## 2023-11-11 PROBLEM — N18.31 STAGE 3A CHRONIC KIDNEY DISEASE (H): Status: ACTIVE | Noted: 2023-11-11

## 2023-11-17 ENCOUNTER — TELEPHONE (OUTPATIENT)
Dept: INTERNAL MEDICINE | Facility: CLINIC | Age: 88
End: 2023-11-17
Payer: MEDICARE

## 2023-11-17 NOTE — TELEPHONE ENCOUNTER
Moraima (no CTC), daughter calling in as FYI for PCP. Pt will not be testing blood sugars even once a month due to the cost of the test strips.    Pt and daughter plan to talk with insurance next week to see if there are alternatives or why they won't cover.    Janis Goodwin RN

## 2023-12-02 DIAGNOSIS — I10 ESSENTIAL HYPERTENSION, BENIGN: ICD-10-CM

## 2023-12-03 RX ORDER — LOSARTAN POTASSIUM 50 MG/1
TABLET ORAL
Qty: 90 TABLET | Refills: 3 | Status: SHIPPED | OUTPATIENT
Start: 2023-12-03 | End: 2024-06-23 | Stop reason: DRUGHIGH

## 2023-12-05 ENCOUNTER — TELEPHONE (OUTPATIENT)
Dept: INTERNAL MEDICINE | Facility: CLINIC | Age: 88
End: 2023-12-05
Payer: MEDICARE

## 2023-12-05 NOTE — TELEPHONE ENCOUNTER
"Patient calling to report that test strips recently sent to pharmacy cost $160, patient refuses to pay that much for test strips, \"I'd rather give it to the [employer]\"    Is there another brand that would be better covered by insurance? Or a prior auth? Patient states that her monitor is nearly 15-20 years old, but that she will use it if she can get more affordable test strips.    Callback: [phone] ok to leave a detailed vm    Linnette Hussein RN  -[facility]  "

## 2023-12-05 NOTE — TELEPHONE ENCOUNTER
If testing blood sugars, would likely need a new glucometer and test strips with bard determined by most recent formulary update from insurance. However, given pt's age and A1Cs staying in the 5s and diabetes being managed by diet only, I would be OK with pt stopping doing blood sugar testing since her control has been so good overall and repeat an A1C lab again in April 2024 when pother fasting labs are drawn as previously ordered

## 2023-12-06 NOTE — TELEPHONE ENCOUNTER
Called and spoke to the patient. Relayed message from the provider below. Patient expressed understanding and had no additional questions at this time.     Dottie Mckeon RN

## 2023-12-13 ENCOUNTER — OFFICE VISIT (OUTPATIENT)
Dept: URGENT CARE | Facility: URGENT CARE | Age: 88
End: 2023-12-13
Payer: MEDICARE

## 2023-12-13 ENCOUNTER — ANCILLARY PROCEDURE (OUTPATIENT)
Dept: GENERAL RADIOLOGY | Facility: CLINIC | Age: 88
End: 2023-12-13
Attending: PHYSICIAN ASSISTANT
Payer: MEDICARE

## 2023-12-13 ENCOUNTER — HOSPITAL ENCOUNTER (EMERGENCY)
Facility: CLINIC | Age: 88
Discharge: HOME | End: 2023-12-13
Attending: EMERGENCY MEDICINE | Admitting: EMERGENCY MEDICINE
Payer: MEDICARE

## 2023-12-13 VITALS
SYSTOLIC BLOOD PRESSURE: 127 MMHG | DIASTOLIC BLOOD PRESSURE: 71 MMHG | BODY MASS INDEX: 32.25 KG/M2 | RESPIRATION RATE: 18 BRPM | HEART RATE: 99 BPM | WEIGHT: 182 LBS | TEMPERATURE: 99.4 F | HEIGHT: 63 IN | OXYGEN SATURATION: 94 %

## 2023-12-13 VITALS
RESPIRATION RATE: 22 BRPM | DIASTOLIC BLOOD PRESSURE: 77 MMHG | HEART RATE: 105 BPM | BODY MASS INDEX: 31.39 KG/M2 | OXYGEN SATURATION: 98 % | WEIGHT: 180 LBS | SYSTOLIC BLOOD PRESSURE: 148 MMHG | TEMPERATURE: 102.6 F

## 2023-12-13 DIAGNOSIS — J10.1 INFLUENZA B: Primary | ICD-10-CM

## 2023-12-13 DIAGNOSIS — R50.9 FEVER AND CHILLS: ICD-10-CM

## 2023-12-13 DIAGNOSIS — R93.89 ABNORMAL X-RAY: ICD-10-CM

## 2023-12-13 DIAGNOSIS — J10.1 INFLUENZA B: ICD-10-CM

## 2023-12-13 LAB
ALBUMIN UR-MCNC: 30 MG/DL
ANION GAP SERPL CALCULATED.3IONS-SCNC: 14 MMOL/L (ref 7–15)
APPEARANCE UR: CLEAR
BASOPHILS # BLD AUTO: 0 10E3/UL (ref 0–0.2)
BASOPHILS NFR BLD AUTO: 0 %
BILIRUB UR QL STRIP: NEGATIVE
BUN SERPL-MCNC: 14.8 MG/DL (ref 8–23)
CALCIUM SERPL-MCNC: 9 MG/DL (ref 8.8–10.2)
CHLORIDE SERPL-SCNC: 96 MMOL/L (ref 98–107)
COLOR UR AUTO: YELLOW
CREAT SERPL-MCNC: 0.97 MG/DL (ref 0.51–0.95)
DEPRECATED HCO3 PLAS-SCNC: 21 MMOL/L (ref 22–29)
EGFRCR SERPLBLD CKD-EPI 2021: 56 ML/MIN/1.73M2
EOSINOPHIL # BLD AUTO: 0 10E3/UL (ref 0–0.7)
EOSINOPHIL NFR BLD AUTO: 0 %
ERYTHROCYTE [DISTWIDTH] IN BLOOD BY AUTOMATED COUNT: 16.6 % (ref 10–15)
FLUAV AG SPEC QL IA: NEGATIVE
FLUBV AG SPEC QL IA: POSITIVE
GLUCOSE SERPL-MCNC: 124 MG/DL (ref 70–99)
GLUCOSE UR STRIP-MCNC: NEGATIVE MG/DL
HCO3 BLDV-SCNC: 23 MMOL/L (ref 21–28)
HCT VFR BLD AUTO: 33.2 % (ref 35–47)
HGB BLD-MCNC: 10.5 G/DL (ref 11.7–15.7)
HGB UR QL STRIP: ABNORMAL
HOLD SPECIMEN: NORMAL
HYALINE CASTS: 8 /LPF
IMM GRANULOCYTES # BLD: 0.1 10E3/UL
IMM GRANULOCYTES NFR BLD: 1 %
KETONES UR STRIP-MCNC: NEGATIVE MG/DL
LACTATE BLD-SCNC: 0.8 MMOL/L
LEUKOCYTE ESTERASE UR QL STRIP: ABNORMAL
LYMPHOCYTES # BLD AUTO: 1.8 10E3/UL (ref 0.8–5.3)
LYMPHOCYTES NFR BLD AUTO: 15 %
MCH RBC QN AUTO: 27.2 PG (ref 26.5–33)
MCHC RBC AUTO-ENTMCNC: 31.6 G/DL (ref 31.5–36.5)
MCV RBC AUTO: 86 FL (ref 78–100)
MONOCYTES # BLD AUTO: 1.9 10E3/UL (ref 0–1.3)
MONOCYTES NFR BLD AUTO: 16 %
MUCOUS THREADS #/AREA URNS LPF: PRESENT /LPF
NEUTROPHILS # BLD AUTO: 8.3 10E3/UL (ref 1.6–8.3)
NEUTROPHILS NFR BLD AUTO: 68 %
NITRATE UR QL: NEGATIVE
NRBC # BLD AUTO: 0 10E3/UL
NRBC BLD AUTO-RTO: 0 /100
PCO2 BLDV: 35 MM HG (ref 40–50)
PH BLDV: 7.42 [PH] (ref 7.32–7.43)
PH UR STRIP: 5.5 [PH] (ref 5–7)
PLATELET # BLD AUTO: 267 10E3/UL (ref 150–450)
PO2 BLDV: 24 MM HG (ref 25–47)
POTASSIUM SERPL-SCNC: 4.1 MMOL/L (ref 3.4–5.3)
PROCALCITONIN SERPL IA-MCNC: 0.18 NG/ML
RADIOLOGIST FLAGS: NORMAL
RBC # BLD AUTO: 3.86 10E6/UL (ref 3.8–5.2)
RBC URINE: 3 /HPF
SAO2 % BLDV: 45 % (ref 94–100)
SODIUM SERPL-SCNC: 131 MMOL/L (ref 135–145)
SP GR UR STRIP: 1.02 (ref 1–1.03)
SQUAMOUS EPITHELIAL: 4 /HPF
UROBILINOGEN UR STRIP-MCNC: 2 MG/DL
WBC # BLD AUTO: 11.8 10E3/UL (ref 4–11)
WBC # BLD AUTO: 12.1 10E3/UL (ref 4–11)
WBC URINE: 11 /HPF

## 2023-12-13 PROCEDURE — 99291 CRITICAL CARE FIRST HOUR: CPT

## 2023-12-13 PROCEDURE — 81001 URINALYSIS AUTO W/SCOPE: CPT | Performed by: EMERGENCY MEDICINE

## 2023-12-13 PROCEDURE — 84145 PROCALCITONIN (PCT): CPT | Performed by: EMERGENCY MEDICINE

## 2023-12-13 PROCEDURE — 82803 BLOOD GASES ANY COMBINATION: CPT

## 2023-12-13 PROCEDURE — 83605 ASSAY OF LACTIC ACID: CPT

## 2023-12-13 PROCEDURE — 250N000013 HC RX MED GY IP 250 OP 250 PS 637: Performed by: EMERGENCY MEDICINE

## 2023-12-13 PROCEDURE — 36415 COLL VENOUS BLD VENIPUNCTURE: CPT | Performed by: PHYSICIAN ASSISTANT

## 2023-12-13 PROCEDURE — 87804 INFLUENZA ASSAY W/OPTIC: CPT | Performed by: PHYSICIAN ASSISTANT

## 2023-12-13 PROCEDURE — 99214 OFFICE O/P EST MOD 30 MIN: CPT | Performed by: PHYSICIAN ASSISTANT

## 2023-12-13 PROCEDURE — 80048 BASIC METABOLIC PNL TOTAL CA: CPT | Performed by: EMERGENCY MEDICINE

## 2023-12-13 PROCEDURE — 85025 COMPLETE CBC W/AUTO DIFF WBC: CPT | Performed by: PHYSICIAN ASSISTANT

## 2023-12-13 PROCEDURE — 71046 X-RAY EXAM CHEST 2 VIEWS: CPT | Mod: TC | Performed by: RADIOLOGY

## 2023-12-13 PROCEDURE — 87040 BLOOD CULTURE FOR BACTERIA: CPT | Performed by: EMERGENCY MEDICINE

## 2023-12-13 PROCEDURE — 87086 URINE CULTURE/COLONY COUNT: CPT | Performed by: EMERGENCY MEDICINE

## 2023-12-13 PROCEDURE — 85048 AUTOMATED LEUKOCYTE COUNT: CPT | Performed by: EMERGENCY MEDICINE

## 2023-12-13 PROCEDURE — 36415 COLL VENOUS BLD VENIPUNCTURE: CPT | Performed by: EMERGENCY MEDICINE

## 2023-12-13 RX ORDER — OSELTAMIVIR PHOSPHATE 75 MG/1
75 CAPSULE ORAL ONCE
Status: COMPLETED | OUTPATIENT
Start: 2023-12-13 | End: 2023-12-13

## 2023-12-13 RX ORDER — OSELTAMIVIR PHOSPHATE 75 MG/1
75 CAPSULE ORAL 2 TIMES DAILY
Qty: 9 CAPSULE | Refills: 0 | Status: SHIPPED | OUTPATIENT
Start: 2023-12-13 | End: 2024-05-24

## 2023-12-13 RX ORDER — ACETAMINOPHEN 325 MG/1
650 TABLET ORAL ONCE
Status: COMPLETED | OUTPATIENT
Start: 2023-12-13 | End: 2023-12-13

## 2023-12-13 RX ORDER — CODEINE PHOSPHATE AND GUAIFENESIN 10; 100 MG/5ML; MG/5ML
1-2 SOLUTION ORAL EVERY 6 HOURS PRN
Qty: 118 ML | Refills: 0 | Status: SHIPPED | OUTPATIENT
Start: 2023-12-13

## 2023-12-13 RX ORDER — OSELTAMIVIR PHOSPHATE 30 MG/1
30 CAPSULE ORAL 2 TIMES DAILY
Qty: 10 CAPSULE | Refills: 0 | Status: SHIPPED | OUTPATIENT
Start: 2023-12-13 | End: 2023-12-13

## 2023-12-13 RX ORDER — CODEINE PHOSPHATE AND GUAIFENESIN 10; 100 MG/5ML; MG/5ML
5 SOLUTION ORAL ONCE
Status: COMPLETED | OUTPATIENT
Start: 2023-12-13 | End: 2023-12-13

## 2023-12-13 RX ADMIN — GUAIFENESIN AND CODEINE PHOSPHATE 5 ML: 100; 10 SOLUTION ORAL at 19:35

## 2023-12-13 RX ADMIN — ACETAMINOPHEN 650 MG: 325 TABLET ORAL at 15:41

## 2023-12-13 RX ADMIN — OSELTAMIVIR PHOSPHATE 75 MG: 75 CAPSULE ORAL at 18:52

## 2023-12-13 ASSESSMENT — ACTIVITIES OF DAILY LIVING (ADL): ADLS_ACUITY_SCORE: 35

## 2023-12-13 NOTE — ED NOTES
Rapid Assessment Note    History:   Janet Cope is a 89 year old female who presents with shortness of breath. The patient reports that she has been more short of breath than usual as well as productive cough with white phlegm. She also endorses fever and rhinorrhea. The patient feels pain in her ribs when she coughs. Of note, the patient was at urgent care for her symptoms, who did labs and a chest X-ray. They found that she had pneumonia and influenza, so they sent her to [facility] ED for further workup and potential admission. She denies any recent contact with anyone that was sick. Lab and imaging findings are noted below.    Influenza A & B Antigen:   Influenza A antigen  Negative Negative    Influenza B antigen  Negative Positive Abnormal      WBC Count:  WBC Count  4.0 - 11.0 10e3/uL 11.8 High  5.3 R 6.9 R 5.1 R 8.3 R 7.0 R 7.6 R     XR Chest, 2 Views:  IMPRESSION:   Stable size of the cardiac silhouette. Prominence of the  nuno bilaterally, could be seen in setting of enlarged pulmonary  arteries. Patchy alveolar opacities throughout both lungs, favor  infectious/inflammatory etiology, less likely pulmonary edema,  recommend radiographic follow-up to resolution. No significant pleural  effusion or pneumothorax. No acute bony abnormality. Left axillary  surgical clips again noted.    Exam:   General:  Alert, interactive  Cardiovascular:  Well perfused  Lungs:  No respiratory distress, no accessory muscle use  Neuro:  Moving all 4 extremities  Skin:  Warm, dry  Psych:  Normal affect      Plan of Care: Sats 96%, patient not breathing hard.  Chest x-ray shows more of a viral pattern.  Will get a procalcitonin, blood cultures and VBG lactate because of her high fever reported earlier at 103 at the clinic.  Tamiflu ordered.  I evaluated the patient and developed an initial plan of care. I discussed this plan and explained that I, or one of my partners, would be returning to complete the evaluation.          I, Vijay Marcello, am serving as a scribe to document services personally performed by Vanessa Booker MD, based on my observations and the provider's statements to me.    12/13/2023  [employer] [employer]    Portions of this medical record were completed by a scribe. UPON MY REVIEW AND AUTHENTICATION BY ELECTRONIC SIGNATURE, this confirms (a) I performed the applicable clinical services, and (b) the record is accurate.          Vanessa Booker MD  12/13/23 5624

## 2023-12-13 NOTE — ED TRIAGE NOTES
Diagnosed with influenza B and possible pneumonia at [facility] today after one week of productive cough with fever. Given Tylenol for temperature of 103 in clinic.      Triage Assessment (Adult)       Row Name 12/13/23 6637          Triage Assessment    Airway WDL WDL        Respiratory WDL    Respiratory WDL X        Skin Circulation/Temperature WDL    Skin Circulation/Temperature WDL WDL        Cardiac WDL    Cardiac WDL WDL        Peripheral/Neurovascular WDL    Peripheral Neurovascular WDL WDL        Cognitive/Neuro/Behavioral WDL    Cognitive/Neuro/Behavioral WDL WDL

## 2023-12-13 NOTE — PROGRESS NOTES
SUBJECTIVE:   Janet Cope is a 89 year old female presenting with a chief complaint of fever, cough - productive, facial pain/pressure, headache, body aches, and fatigue.  Onset of symptoms was 5 day(s) ago.  Course of illness is waxing and waning.    Severity moderate  Current and Associated symptoms: as above  Treatment measures tried include Tylenol/Ibuprofen.  Predisposing factors include None.    Past Medical History:   Diagnosis Date    Anxiety     Basal cell carcinoma     CKD (chronic kidney disease) stage 3, GFR 30-59 ml/min (H)     Depression     Diverticulosis of colon (without mention of hemorrhage) 8/02    colonoscopy negative other than sigmoid diverticulosis    Enthesopathy of hip region     Bilateral    Essential hypertension, benign     Generalized osteoarthrosis, unspecified site     Macular degeneration     Macular degeneration, dry     Osteoporosis, unspecified     Other specified gastritis without mention of hemorrhage     Personal history of colonic polyps 7/03     had hyperplastic polyps 2013    Personal history of malignant neoplasm of breast 1995 & 9/01    left breast cancer (1995), right breast cancer (9/01); patient sees Dr. Martinez regularly    Postmenopausal bleeding 5/02    endometrial biopsies negative; s/p D & C    Type 2 diabetes mellitus with diabetic chronic kidney disease  (goal A1C<7) 10/24/2015    Unspecified hemorrhoids without mention of complication 10/05    Internal, s/p banding ligature     Current Outpatient Medications   Medication Sig Dispense Refill    acetaminophen (TYLENOL) 650 MG CR tablet Take 1 tablet (650 mg) by mouth nightly as needed for mild pain or fever      amLODIPine (NORVASC) 10 MG tablet TAKE 1 TABLET(10 MG) BY MOUTH DAILY IN THE EVENING 90 tablet 1    ASPIRIN 81 MG OR TABS 1 tab po QD (Once per day)      atorvastatin (LIPITOR) 20 MG tablet TAKE 1 TABLET(20 MG) BY MOUTH DAILY 90 tablet 0    escitalopram (LEXAPRO) 10 MG tablet TAKE 1 TABLET(10 MG) BY  MOUTH DAILY 90 tablet 3    FISH OIL 1000 MG OR CAPS 3 qd      losartan (COZAAR) 50 MG tablet TAKE 1 TABLET(50 MG) BY MOUTH DAILY 90 tablet 3    Multiple Vitamins-Minerals (VITAMIN D3 COMPLETE PO)       NEW MED One Touch Lancet device 1 0    omeprazole (PRILOSEC) 20 MG DR capsule TAKE 1 CAPSULE(20 MG) BY MOUTH TWICE DAILY 180 capsule 3    order for DME One Touch Ultra test strips. Checking sugars daily 50 strip 11     Social History     Tobacco Use    Smoking status: Former     Types: Cigarettes     Quit date: 10/10/1981     Years since quittin.2    Smokeless tobacco: Never   Substance Use Topics    Alcohol use: Yes     Alcohol/week: 21.0 - 28.0 standard drinks of alcohol     Types: 21 - 28 Glasses of wine per week     Comment: 3-4 glass of wine daily       ROS:  Review of systems negative except as stated above.    OBJECTIVE:  BP (!) 148/77 (BP Location: Right arm, Patient Position: Sitting, Cuff Size: Adult Large)   Pulse 105   Temp (!) 103.1  F (39.5  C) (Tympanic)   Resp 22   Wt 81.6 kg (180 lb)   SpO2 98%   Breastfeeding No   BMI 31.39 kg/m    GENERAL APPEARANCE: healthy, alert and no distress  HENT: LTM erythematous and bulging.  Nose and mouth without ulcers, erythema or lesions  NECK: supple, nontender, no lymphadenopathy  RESP: lungs clear to auscultation - no rales, rhonchi or wheezes  CV: regular rates and rhythm, normal S1 S2, no murmur noted  NEURO: Normal strength and tone, sensory exam grossly normal,  normal speech and mentation  SKIN: no suspicious lesions or rashes      Results for orders placed or performed in visit on 23   XR Chest 2 Views     Status: None   Result Value Ref Range    Radiologist flags Possible multifocal pneumonia, recommend     Narrative    XR CHEST 2 VIEWS   2023 3:39 PM     HISTORY: Fever and chills    COMPARISON: Chest radiograph 5/10/2006      Impression    IMPRESSION: Stable size of the cardiac silhouette. Prominence of the  nuno bilaterally, could be  seen in setting of enlarged pulmonary  arteries. Patchy alveolar opacities throughout both lungs, favor  infectious/inflammatory etiology, less likely pulmonary edema,  recommend radiographic follow-up to resolution. No significant pleural  effusion or pneumothorax. No acute bony abnormality. Left axillary  surgical clips again noted.    [Recommend Follow Up: Possible multifocal pneumonia, recommend  radiographic follow-up to resolution.]    This report will be copied to the [facility] to ensure a  provider acknowledges the finding.     JUNAID RAYGOZA MD         SYSTEM ID:  [id number]   Results for orders placed or performed in visit on 12/13/23   WBC count     Status: Abnormal   Result Value Ref Range    WBC Count 11.8 (H) 4.0 - 11.0 10e3/uL   Influenza A & B Antigen - Clinic Collect     Status: Abnormal    Specimen: Nose; Swab   Result Value Ref Range    Influenza A antigen Negative Negative    Influenza B antigen Positive (A) Negative    Narrative    Test results must be correlated with clinical data. If necessary, results should be confirmed by a molecular assay or viral culture.       ASSESSMENT:  (J10.1) Influenza B  (primary encounter diagnosis)  (R93.89) Abnormal x-ray  (R50.9) Fever and chills  Plan: Influenza A & B Antigen - Clinic Collect,         acetaminophen (TYLENOL) tablet 650 mg, XR Chest        2 Views, WBC count    Directed to ED for assessment and treatment

## 2023-12-14 NOTE — ED PROVIDER NOTES
"  History     Chief Complaint:  Shortness of Breath, Fever, and Cough       HPI   Janet Cope is a 89 year old female with history of hypertension, hyperlipidemia, and type 2 diabetes who presents with shortness of breath. The patient reports that she has been more short of breath than usual as well as productive cough with white phlegm. She also endorses fever and rhinorrhea. The patient feels pain in her ribs when she coughs. Of note, the patient was at urgent care for her symptoms, who did labs and a chest X-ray. They found that she had pneumonia and influenza, so they sent her to [facility] ED for further workup and potential admission. She denies any recent contact with anyone that was sick. Lab and imaging findings are noted below.        Independent Historian:   None - Patient Only    Review of External Notes:   I reviewed the office visit note from earlier today, 12/13/23       Medications:    Norvasc  Aspirin 81 mg  Lipitor  Lexapro  Cozaar  Prilosec  Tamiflu    Past Medical History:    Anxiety  Basal cell carcinoma  CKD  Depression  Diverticulosis  Enthesopathy of hip  HTN  Macular degeneration  Type 2 diabetes  Hemorrhoids  Gastritis  HLD    Past Surgical History:    Breast lumpectomy (B)  Hysterectomy    Physical Exam   Patient Vitals for the past 24 hrs:   BP Temp Temp src Pulse Resp SpO2 Height Weight   12/13/23 1845 127/71 99.4  F (37.4  C) Oral 99 18 94 % -- --   12/13/23 1745 (!) 145/62 98.2  F (36.8  C) Oral 98 18 96 % 1.6 m (5' 3\") 82.6 kg (182 lb)        Physical Exam  Nursing note and vitals reviewed.  Constitutional:  Alert.  Appears comfortable.   HENT:    Mucous membranes are moist  Eyes:    Conjunctivae are normal.      Right eye exhibits no discharge. Left eye exhibits no discharge.   Cardiovascular:  Normal rate, regular rhythm.      Grade 1 systolic murmur  Pulmonary/Chest:  Breath sounds clear. No respiratory distress.     No stridor.  Effort is normal.  Neurological:   Alert and " appropriate. No focal weakness.  Skin:    Skin is warm and dry. No rash noted. No diaphoresis.   Psychiatric:   Behavior is normal. Judgment and thought content normal.     Emergency Department Course     Laboratory:  Labs Ordered and Resulted from Time of ED Arrival to Time of ED Departure   BASIC METABOLIC PANEL - Abnormal       Result Value    Sodium 131 (*)     Potassium 4.1      Chloride 96 (*)     Carbon Dioxide (CO2) 21 (*)     Anion Gap 14      Urea Nitrogen 14.8      Creatinine 0.97 (*)     GFR Estimate 56 (*)     Calcium 9.0      Glucose 124 (*)    ROUTINE UA WITH MICROSCOPIC REFLEX TO CULTURE - Abnormal    Color Urine Yellow      Appearance Urine Clear      Glucose Urine Negative      Bilirubin Urine Negative      Ketones Urine Negative      Specific Gravity Urine 1.022      Blood Urine Small (*)     pH Urine 5.5      Protein Albumin Urine 30 (*)     Urobilinogen Urine 2.0      Nitrite Urine Negative      Leukocyte Esterase Urine Small (*)     Mucus Urine Present (*)     RBC Urine 3 (*)     WBC Urine 11 (*)     Squamous Epithelials Urine 4 (*)     Hyaline Casts Urine 8 (*)    CBC WITH PLATELETS AND DIFFERENTIAL - Abnormal    WBC Count 12.1 (*)     RBC Count 3.86      Hemoglobin 10.5 (*)     Hematocrit 33.2 (*)     MCV 86      MCH 27.2      MCHC 31.6      RDW 16.6 (*)     Platelet Count 267      % Neutrophils 68      % Lymphocytes 15      % Monocytes 16      % Eosinophils 0      % Basophils 0      % Immature Granulocytes 1      NRBCs per 100 WBC 0      Absolute Neutrophils 8.3      Absolute Lymphocytes 1.8      Absolute Monocytes 1.9 (*)     Absolute Eosinophils 0.0      Absolute Basophils 0.0      Absolute Immature Granulocytes 0.1      Absolute NRBCs 0.0     ISTAT GASES LACTATE VENOUS POCT - Abnormal    Lactic Acid POCT 0.8      Bicarbonate Venous POCT 23      O2 Sat, Venous POCT 45 (*)     pCO2 Venous POCT 35 (*)     pH Venous POCT 7.42      pO2 Venous POCT 24 (*)    PROCALCITONIN - Normal     Procalcitonin 0.18     BLOOD CULTURE   BLOOD CULTURE   URINE CULTURE        Emergency Department Course & Assessments:        Interventions:  Medications   guaiFENesin-codeine (ROBITUSSIN AC) 100-10 MG/5ML solution 5 mL (has no administration in time range)   oseltamivir (TAMIFLU) capsule 75 mg (75 mg Oral $Given 12/13/23 8485)        Assessments:  1754 I obtained history and examined the patient as noted above  1905 I rechecked the patient and explained findings    Independent Interpretation (X-rays, CTs, rhythm strip):  None    Consultations/Discussion of Management or Tests:  None        Social Determinants of Health affecting care:   None    Disposition:  The patient was discharged to home.     Impression & Plan      Medical Decision Making:  Patient sent in from clinic after she was diagnosed with influenza B and had some haziness on her x-ray.  In looking at the report, it sounds like it is more of a viral pattern on her x-ray, or hilar.  I got labs here and her lactic acid is normal, procalcitonin is normal, white count is 12.1 which would certainly be consistent with influenza B.  Her saturations are 95% on room air and her respiratory effort is normal.  She is coughing a lot and was given a teaspoon of Robitussin with codeine cough syrup as she has tried multiple over-the-counter ones and they have not helped.  At this point the patient does not appear to have a bacterial infection and I believe it is primarily an influenza B infection.  The daughter states that they can get a pulse oximeter and monitor this over the next few days and if she starts becoming hypoxic at any time she needs to be reevaluated.  This point I would hold on antibiotics but would continue Tamiflu, she was given her first dose here.  They understand that this may help shorten the course of her illness some.  Routine instructions were given and she will be rechecked if gets worse.    Push fluids, get plenty of rest, large doses of  vitamin C, Tylenol as needed. Take your Tamiflu until it is gone. Over-the-counter medications as needed.  Robitussin with codeine at bedtime to help with sleep. If you get acutely worse, you need to be reevaluated.  Contact your doctor if any questions.    Diagnosis:    ICD-10-CM    1. Influenza B  J10.1            Discharge Medications:  New Prescriptions    GUAIFENESIN-CODEINE (ROBITUSSIN AC) 100-10 MG/5ML SOLUTION    Take 5-10 mLs by mouth every 6 hours as needed for cough    OSELTAMIVIR (TAMIFLU) 75 MG CAPSULE    Take 1 capsule (75 mg) by mouth 2 times daily          Scribe Disclosure:  I, Vijay Armendariz, am serving as a scribe at 6:39 PM on 12/13/2023 to document services personally performed by Vanessa Booker MD based on my observations and the provider's statements to me.   12/13/2023   Vanessa Booker MD Powell, Tracy Alan, MD  12/1935

## 2023-12-14 NOTE — DISCHARGE INSTRUCTIONS
Push fluids, get plenty of rest, large doses of vitamin C, Tylenol as needed.  Take your Tamiflu until it is gone.  Over-the-counter medications as needed.  Robitussin with codeine at bedtime to help with sleep.  If you get acutely worse, you need to be reevaluated.  Contact your doctor if any questions.

## 2023-12-15 ENCOUNTER — TELEPHONE (OUTPATIENT)
Dept: INTERNAL MEDICINE | Facility: CLINIC | Age: 88
End: 2023-12-15
Payer: MEDICARE

## 2023-12-15 LAB — BACTERIA UR CULT: NORMAL

## 2023-12-15 NOTE — TELEPHONE ENCOUNTER
"Received a call from the patient's Daughter, Alexia, stating the patient was seen in the ER on Wednesday. Patient tested positive for Influenza B and was sent home on Tamiflu and Robitussin.    Alexia states she does not feel like the patient's symptoms are getting better but she does not feel like they are getting worse. Patient continues to have some SOB and a heavy cough.     SOB: Alexia states the patient has a hard time catching her breathe. She will be calm for about one minute and then she will fixate on something and start to get SOB. Alexia does feel like some of the patient's SOB is due to anxiety. Patient made a comment this morning to Alexia that she \"feels like giving up\" because she does not feel like she is getting better. Alexia also states it is hard to get the patient to sit still. She will lay in bed for a couple of minutes and then she will go back to the chair.     Coughing: Alexia states the patient is coughing up large amounts of mucous. Mucous is clear in color. No fever. Patient does have a headache.     Alexia states she does have an oxygen monitor at her home that she can bring over to the patient's house. Triage RN advised to continue to monitor patient's symptoms including her oxygen levels. If her oxygen is below 90% or her breathing gets worse, patient should be brought back to the ER. Otherwise, at this time, Triage RN advised to continue with the medications as prescribed.     Will also route to PCP for review and any further recommendations.     Dottie Mckeon RN  "

## 2023-12-15 NOTE — TELEPHONE ENCOUNTER
Agree with triage RN instructions.  Would like to avoid anti-anxiety corey that jaison oversedate pt and recommend family talk with pt as able to help calm her down that way  Agree with O2 sat monotoring  If not using now, add Mucinex ER 12hr tab, 2 tab twice a day prn productive cough to act as expectorant to help clear sputum. Will take time for viral infection to improve and Tamiflu will hopefully shorten course

## 2023-12-16 NOTE — RESULT ENCOUNTER NOTE
Final urine culture report is negative.  Adult Negative Urine culture parameters per protocol: Any # Urogenital single or mixed organism, <10,000 col/ml single organism (cath/midstream), and > 3 organisms (No susceptibilities performed).  [facility] Emergency Dept discharge antibiotic prescribed (If applicable): None  Treatment recommendations per [facility] ED Lab Result Urine Culture protocol.

## 2023-12-18 LAB
BACTERIA BLD CULT: NO GROWTH
BACTERIA BLD CULT: NO GROWTH

## 2023-12-19 NOTE — TELEPHONE ENCOUNTER
Patient Contact    Attempt # 3    Was call answered?  No.  Left message on voicemail with information to call me back.

## 2024-01-05 DIAGNOSIS — E78.5 HYPERLIPIDEMIA LDL GOAL <100: ICD-10-CM

## 2024-01-07 RX ORDER — ATORVASTATIN CALCIUM 20 MG/1
20 TABLET, FILM COATED ORAL DAILY
Qty: 90 TABLET | Refills: 1 | Status: SHIPPED | OUTPATIENT
Start: 2024-01-07 | End: 2024-06-28

## 2024-01-29 DIAGNOSIS — I10 ESSENTIAL HYPERTENSION, BENIGN: ICD-10-CM

## 2024-01-29 RX ORDER — AMLODIPINE BESYLATE 10 MG/1
TABLET ORAL
Qty: 90 TABLET | Refills: 2 | Status: SHIPPED | OUTPATIENT
Start: 2024-01-29

## 2024-02-05 DIAGNOSIS — I10 ESSENTIAL HYPERTENSION, BENIGN: ICD-10-CM

## 2024-02-05 RX ORDER — AMLODIPINE BESYLATE 10 MG/1
TABLET ORAL
Qty: 90 TABLET | Refills: 2 | OUTPATIENT
Start: 2024-02-05

## 2024-05-23 ENCOUNTER — LAB (OUTPATIENT)
Dept: LAB | Facility: CLINIC | Age: 89
End: 2024-05-23
Payer: MEDICARE

## 2024-05-23 DIAGNOSIS — R53.83 OTHER FATIGUE: ICD-10-CM

## 2024-05-23 DIAGNOSIS — N18.31 TYPE 2 DIABETES MELLITUS WITH STAGE 3A CHRONIC KIDNEY DISEASE, WITHOUT LONG-TERM CURRENT USE OF INSULIN (H): ICD-10-CM

## 2024-05-23 DIAGNOSIS — E78.5 HYPERLIPIDEMIA LDL GOAL <100: ICD-10-CM

## 2024-05-23 DIAGNOSIS — E11.22 TYPE 2 DIABETES MELLITUS WITH STAGE 3A CHRONIC KIDNEY DISEASE, WITHOUT LONG-TERM CURRENT USE OF INSULIN (H): ICD-10-CM

## 2024-05-23 DIAGNOSIS — F10.10 EXCESSIVE DRINKING ALCOHOL: ICD-10-CM

## 2024-05-23 DIAGNOSIS — N18.31 STAGE 3A CHRONIC KIDNEY DISEASE (H): ICD-10-CM

## 2024-05-23 DIAGNOSIS — R80.9 PROTEINURIA, UNSPECIFIED TYPE: ICD-10-CM

## 2024-05-23 LAB
ALBUMIN SERPL BCG-MCNC: 4.2 G/DL (ref 3.5–5.2)
ALP SERPL-CCNC: 108 U/L (ref 40–150)
ALT SERPL W P-5'-P-CCNC: 30 U/L (ref 0–50)
ANION GAP SERPL CALCULATED.3IONS-SCNC: 14 MMOL/L (ref 7–15)
AST SERPL W P-5'-P-CCNC: 54 U/L (ref 0–45)
BILIRUB SERPL-MCNC: 0.5 MG/DL
BUN SERPL-MCNC: 16.4 MG/DL (ref 8–23)
CALCIUM SERPL-MCNC: 9.5 MG/DL (ref 8.8–10.2)
CHLORIDE SERPL-SCNC: 104 MMOL/L (ref 98–107)
CHOLEST SERPL-MCNC: 181 MG/DL
CREAT SERPL-MCNC: 0.79 MG/DL (ref 0.51–0.95)
DEPRECATED HCO3 PLAS-SCNC: 21 MMOL/L (ref 22–29)
EGFRCR SERPLBLD CKD-EPI 2021: 71 ML/MIN/1.73M2
ERYTHROCYTE [DISTWIDTH] IN BLOOD BY AUTOMATED COUNT: 15.5 % (ref 10–15)
FASTING STATUS PATIENT QL REPORTED: YES
FASTING STATUS PATIENT QL REPORTED: YES
GLUCOSE SERPL-MCNC: 143 MG/DL (ref 70–99)
HBA1C MFR BLD: 6.1 % (ref 0–5.6)
HCT VFR BLD AUTO: 38.5 % (ref 35–47)
HDLC SERPL-MCNC: 66 MG/DL
HGB BLD-MCNC: 12.2 G/DL (ref 11.7–15.7)
LDLC SERPL CALC-MCNC: 98 MG/DL
MCH RBC QN AUTO: 27.3 PG (ref 26.5–33)
MCHC RBC AUTO-ENTMCNC: 31.7 G/DL (ref 31.5–36.5)
MCV RBC AUTO: 86 FL (ref 78–100)
NONHDLC SERPL-MCNC: 115 MG/DL
PLATELET # BLD AUTO: 283 10E3/UL (ref 150–450)
POTASSIUM SERPL-SCNC: 4.4 MMOL/L (ref 3.4–5.3)
PROT SERPL-MCNC: 8 G/DL (ref 6.4–8.3)
RBC # BLD AUTO: 4.47 10E6/UL (ref 3.8–5.2)
SODIUM SERPL-SCNC: 139 MMOL/L (ref 135–145)
TRIGL SERPL-MCNC: 84 MG/DL
WBC # BLD AUTO: 6.9 10E3/UL (ref 4–11)

## 2024-05-23 PROCEDURE — 36415 COLL VENOUS BLD VENIPUNCTURE: CPT

## 2024-05-23 PROCEDURE — 84443 ASSAY THYROID STIM HORMONE: CPT

## 2024-05-23 PROCEDURE — 82043 UR ALBUMIN QUANTITATIVE: CPT

## 2024-05-23 PROCEDURE — 83970 ASSAY OF PARATHORMONE: CPT

## 2024-05-23 PROCEDURE — 82570 ASSAY OF URINE CREATININE: CPT

## 2024-05-23 PROCEDURE — 85027 COMPLETE CBC AUTOMATED: CPT

## 2024-05-23 PROCEDURE — 80053 COMPREHEN METABOLIC PANEL: CPT

## 2024-05-23 PROCEDURE — 83036 HEMOGLOBIN GLYCOSYLATED A1C: CPT

## 2024-05-23 PROCEDURE — 80061 LIPID PANEL: CPT

## 2024-05-24 ENCOUNTER — VIRTUAL VISIT (OUTPATIENT)
Dept: INTERNAL MEDICINE | Facility: CLINIC | Age: 89
End: 2024-05-24
Payer: MEDICARE

## 2024-05-24 DIAGNOSIS — R53.83 OTHER FATIGUE: Primary | ICD-10-CM

## 2024-05-24 DIAGNOSIS — F32.0 MILD MAJOR DEPRESSION (H): ICD-10-CM

## 2024-05-24 DIAGNOSIS — N18.31 TYPE 2 DIABETES MELLITUS WITH STAGE 3A CHRONIC KIDNEY DISEASE, WITHOUT LONG-TERM CURRENT USE OF INSULIN (H): ICD-10-CM

## 2024-05-24 DIAGNOSIS — F10.10 EXCESSIVE DRINKING OF ALCOHOL: ICD-10-CM

## 2024-05-24 DIAGNOSIS — I10 ESSENTIAL HYPERTENSION, BENIGN: ICD-10-CM

## 2024-05-24 DIAGNOSIS — F41.9 ANXIETY: ICD-10-CM

## 2024-05-24 DIAGNOSIS — E11.22 TYPE 2 DIABETES MELLITUS WITH STAGE 3A CHRONIC KIDNEY DISEASE, WITHOUT LONG-TERM CURRENT USE OF INSULIN (H): ICD-10-CM

## 2024-05-24 LAB
CREAT UR-MCNC: 70.5 MG/DL
MICROALBUMIN UR-MCNC: 27.5 MG/L
MICROALBUMIN/CREAT UR: 39.01 MG/G CR (ref 0–25)
PTH-INTACT SERPL-MCNC: 26 PG/ML (ref 15–65)
TSH SERPL DL<=0.005 MIU/L-ACNC: 1.66 UIU/ML (ref 0.3–4.2)

## 2024-05-24 PROCEDURE — G2211 COMPLEX E/M VISIT ADD ON: HCPCS | Mod: 95 | Performed by: INTERNAL MEDICINE

## 2024-05-24 PROCEDURE — 99214 OFFICE O/P EST MOD 30 MIN: CPT | Mod: 95 | Performed by: INTERNAL MEDICINE

## 2024-05-24 ASSESSMENT — PATIENT HEALTH QUESTIONNAIRE - PHQ9
SUM OF ALL RESPONSES TO PHQ QUESTIONS 1-9: 3
SUM OF ALL RESPONSES TO PHQ QUESTIONS 1-9: 3
10. IF YOU CHECKED OFF ANY PROBLEMS, HOW DIFFICULT HAVE THESE PROBLEMS MADE IT FOR YOU TO DO YOUR WORK, TAKE CARE OF THINGS AT HOME, OR GET ALONG WITH OTHER PEOPLE: SOMEWHAT DIFFICULT

## 2024-05-24 NOTE — PROGRESS NOTES
Janet is a 89 year old who is being evaluated via a billable video visit.    How would you like to obtain your AVS? MyChart  If the video visit is dropped, the invitation should be resent by: Text to cell phone: [phone]  Will anyone else be joining your video visit? No      ASSESSMENT:    1. Other fatigue   Sleeping OK  per pt. TSH lab was added on to labs drawn day before and OK. Likely related to sleep quality with ETOH use. Counseled to reduced alcohol to 1 glass wine/day or less to see if helps and lessen night urination that interrupts sleep  - TSH with free T4 reflex; Future    2. Anxiety   Feels OK in her home but fear outside of it. Declines counseling. Will add back some Buspar. Not candidate for benzo with ETOH use  - busPIRone (BUSPAR) 15 MG tablet; Take 0.5 tablets (7.5 mg) by mouth 2 times daily For anxiety  Dispense: 30 tablet; Refill: 11    3. Type 2 diabetes mellitus with stage 3a chronic kidney disease, without long-term current use of insulin (H)   Controlled. Continue current management and repeat lab 3 mos. GFR improved and normal with last lab after always rahel < 60 previously   - Hemoglobin A1c; Future  - Basic metabolic panel; Future    4. Excessive drinking of alcohol  Pt does not wish to reduce intake. Encouraged her to reduce to 1 glass of wine/day or less    5. Mild major depression (H24)   On Lexapro. Controlled with PHQ9 = 3. No suicidal ideation     6. Essential hypertension, benign  Previously controlled.  Patient check blood pressures at home and send the results.  Continue losartan pending blood pressure update      PLAN:   Reduce wine intake to 1 glass of wine/day or less  Add Buspirone 15mg tab, 1/2 tab (7.5mg) twice a day for anxiety   Would encourage trial of walking  briefly in hallway 2-3 times a day even just down a couple  apartments in length before coming back to your apartment or trial of going grocery shopping with daughter or to grandchildren's home for  visit  Check daily blood pressure at home for 1 week and send me results in eMar message  Update me re: mood in 4 weeks or earlier if having side effects with the Buspirone addition      Video-Visit Details    Type of service:  Video Visit    Video Start Time: 11:45 AM    Video End Time: 12:08 PM    Originating Location (pt. Location): Home    Distant Location (provider location):  [facility]     Platform used for Video Visit: Abby Gonzales is a 89 year old, presenting for the following health issues:  Results and Recheck Medication  Labs 5/22/2024    Pt does not want to go out much, pt states they may want to increase dosage of Lexapro. Pt enjoys most comfort from her home.    Most recent lab results reviewed with pt.      Component      Latest Ref Rng 5/23/2024  8:57 AM 5/23/2024  8:59 AM   Sodium      135 - 145 mmol/L 139     Potassium      3.4 - 5.3 mmol/L 4.4     Carbon Dioxide (CO2)      22 - 29 mmol/L 21 (L)     Anion Gap      7 - 15 mmol/L 14     Urea Nitrogen      8.0 - 23.0 mg/dL 16.4     Creatinine      0.51 - 0.95 mg/dL 0.79     GFR Estimate      >60 mL/min/1.73m2 71     Calcium      8.8 - 10.2 mg/dL 9.5     Chloride      98 - 107 mmol/L 104     Glucose      70 - 99 mg/dL 143 (H)     Alkaline Phosphatase      40 - 150 U/L 108     AST      0 - 45 U/L 54 (H)     ALT      0 - 50 U/L 30     Protein Total      6.4 - 8.3 g/dL 8.0     Albumin      3.5 - 5.2 g/dL 4.2     Bilirubin Total      <=1.2 mg/dL 0.5     Patient Fasting? Yes     Patient Fasting? Yes     WBC      4.0 - 11.0 10e3/uL 6.9     RBC Count      3.80 - 5.20 10e6/uL 4.47     Hemoglobin      11.7 - 15.7 g/dL 12.2     Hematocrit      35.0 - 47.0 % 38.5     MCV      78 - 100 fL 86     MCH      26.5 - 33.0 pg 27.3     MCHC      31.5 - 36.5 g/dL 31.7     RDW      10.0 - 15.0 % 15.5 (H)     Platelet Count      150 - 450 10e3/uL 283     Cholesterol      <200 mg/dL 181     Triglycerides      <150 mg/dL  84     HDL Cholesterol      >=50 mg/dL 66     LDL Cholesterol Calculated      <=100 mg/dL 98     Non HDL Cholesterol      <130 mg/dL 115     Creatinine Urine      mg/dL  70.5    Albumin Urine mg/L      mg/L  27.5    Albumin Urine mg/g Cr      0.00 - 25.00 mg/g Cr  39.01 (H)    Parathyroid Hormone Intact      15 - 65 pg/mL 26     Hemoglobin A1C      0.0 - 5.6 % 6.1 (H)     TSH      0.30 - 4.20 uIU/mL 1.66        Legend:  (L) Low  (H) High    HPI:  Patient states she is heavy in her apartment.  Gets nervous when going outside of her apartment such as outside the building or even sometimes walking in the hallways to throwing her trash.  Has a cat and enjoys time with it.  Cooking herself.  Bathing by herself.  Likes doing puzzles and reading with her iPad.  Enjoys when grandchildren come to visit her but does not wish to go out to visit them at their homes.  Currently living with her daughter.  Denies chest pain, shortness of breath, headaches.  Of the pressure medication and due for recheck.  Has blood pressure monitor at home but has not used recently   Overall feels happy when  at home. PHQ9 = 3. No suicidal ideation   Still drinking 2-3 glasses of wine/day and not willing to quit   Has mild fatigue during the day.  Sleeping OK per pt  Hx DM. Not checking sugars. Prior control good   Has some nocturia with ETOH use that interrupts sleep     Additional ROS:   Constitutional, HEENT, Cardiovascular, Pulmonary, GI and , Neuro, MSK and Psych review of systems/symptoms are otherwise negative or unchanged from previous, except as noted above.           Objective :  No vitals obtained today    Physical Exam:  GENERAL:  alert and no distress  EYES: Eyes grossly normal to inspection, conjunctivae and sclerae normal  RESP: no audible wheeze, cough, or visible cyanosis.  No visible retractions or increased work of breathing.  Able to speak fully in complete sentences.  NEURO: Cranial nerves grossly intact, mentation intact  and speech normal  PSYCH: mentation appears normal, affect normal/bright, judgement and insight intact, normal speech and appearance well-groomed    The longitudinal plan of care for the diagnosis(es)/condition(s) as documented were addressed during this visit. Due to the added complexity in care, I will continue to support Janet in the subsequent management and with ongoing continuity of care.       Frank Kovacs MD  Internal Medicine Department  [facility]  Internal Medicine Department      (Chart documentation was completed, in part, with Clear[employer] voice-recognition software. Even though reviewed, some grammatical, spelling, and word errors may remain.)

## 2024-05-28 ENCOUNTER — MYC MEDICAL ADVICE (OUTPATIENT)
Dept: INTERNAL MEDICINE | Facility: CLINIC | Age: 89
End: 2024-05-28
Payer: MEDICARE

## 2024-06-17 ENCOUNTER — MYC REFILL (OUTPATIENT)
Dept: INTERNAL MEDICINE | Facility: CLINIC | Age: 89
End: 2024-06-17
Payer: MEDICARE

## 2024-06-17 ENCOUNTER — TELEPHONE (OUTPATIENT)
Dept: INTERNAL MEDICINE | Facility: CLINIC | Age: 89
End: 2024-06-17
Payer: MEDICARE

## 2024-06-17 DIAGNOSIS — E11.9 TYPE II OR UNSPECIFIED TYPE DIABETES MELLITUS WITHOUT MENTION OF COMPLICATION, NOT STATED AS UNCONTROLLED: ICD-10-CM

## 2024-06-17 RX ORDER — BUSPIRONE HYDROCHLORIDE 15 MG/1
7.5 TABLET ORAL 2 TIMES DAILY
Qty: 30 TABLET | Refills: 11 | Status: SHIPPED | OUTPATIENT
Start: 2024-06-17

## 2024-06-18 NOTE — TELEPHONE ENCOUNTER
Spoke with pt and also spoke with daughter Xena. Pt has anxiety and not wanting to leave apt very often. Did get out  1 week ago to have her hair done. Gets nervous taking the trash out down the hallway. Content when in her  apt and enjoys people coming over to play cribbage, etc.  At first at last appt, had dicussed having pt try Lorazepam daily but later remembers that pt drinking 3 glasses wine/day and  not wiling to reduce intake  so not candidate for benzo. Had been on Busapar 7.5mg BID in past with benefit but stopped it in October after pt stated she no longer needed it. Will restart Buspar at that dose and await response. Encouraged pt to  try and get out of apt when walking in hallways some. Discussed with daughter Xena and apologized for delay in formatting final plan. She was appreciative of phone call and she will also encourage pt to  walk codi in hallways, even with family member

## 2024-06-23 ENCOUNTER — MYC MEDICAL ADVICE (OUTPATIENT)
Dept: INTERNAL MEDICINE | Facility: CLINIC | Age: 89
End: 2024-06-23
Payer: MEDICARE

## 2024-06-23 DIAGNOSIS — E11.22 TYPE 2 DIABETES MELLITUS WITH STAGE 3A CHRONIC KIDNEY DISEASE, WITHOUT LONG-TERM CURRENT USE OF INSULIN (H): ICD-10-CM

## 2024-06-23 DIAGNOSIS — I10 HYPERTENSION, UNSPECIFIED TYPE: Primary | ICD-10-CM

## 2024-06-23 DIAGNOSIS — N18.31 TYPE 2 DIABETES MELLITUS WITH STAGE 3A CHRONIC KIDNEY DISEASE, WITHOUT LONG-TERM CURRENT USE OF INSULIN (H): ICD-10-CM

## 2024-06-23 DIAGNOSIS — R80.9 PROTEINURIA, UNSPECIFIED TYPE: ICD-10-CM

## 2024-06-23 RX ORDER — LOSARTAN POTASSIUM 100 MG/1
100 TABLET ORAL DAILY
Qty: 90 TABLET | Refills: 3 | Status: SHIPPED | OUTPATIENT
Start: 2024-06-23

## 2024-06-24 NOTE — PATIENT INSTRUCTIONS
Reduce wine intake to 1 glass of wine/day or less  Add Buspirone 15mg tab, 1/2 tab (7.5mg) twice a day for anxiety   Would encourage trial of walking  briefly in hallway 2-3 times a day even just down a couple  apartments in length before coming back to your apartment or trial of going grocery shopping with daughter or to grandchildren's home for visit  Check daily blood pressure at home for 1 week and send me results in [employer] message  Update me re: mood in 4 weeks or earlier if having side effects with the Buspirone addition

## 2024-06-28 DIAGNOSIS — E78.5 HYPERLIPIDEMIA LDL GOAL <100: ICD-10-CM

## 2024-06-28 RX ORDER — ATORVASTATIN CALCIUM 20 MG/1
20 TABLET, FILM COATED ORAL DAILY
Qty: 90 TABLET | Refills: 2 | Status: SHIPPED | OUTPATIENT
Start: 2024-06-28

## 2024-06-28 NOTE — TELEPHONE ENCOUNTER
Prescription approved per [facility] Refill Protocol.  Dottie Mckeon, RN  [facility] Triage Nurse

## 2024-08-07 DIAGNOSIS — K21.9 GASTROESOPHAGEAL REFLUX DISEASE WITHOUT ESOPHAGITIS: ICD-10-CM

## 2024-09-28 ENCOUNTER — LAB (OUTPATIENT)
Dept: LAB | Facility: CLINIC | Age: 89
End: 2024-09-28
Payer: MEDICARE

## 2024-09-28 DIAGNOSIS — I10 HYPERTENSION, UNSPECIFIED TYPE: ICD-10-CM

## 2024-09-28 DIAGNOSIS — E11.22 TYPE 2 DIABETES MELLITUS WITH STAGE 3A CHRONIC KIDNEY DISEASE, WITHOUT LONG-TERM CURRENT USE OF INSULIN (H): ICD-10-CM

## 2024-09-28 DIAGNOSIS — R80.9 PROTEINURIA, UNSPECIFIED TYPE: ICD-10-CM

## 2024-09-28 DIAGNOSIS — N18.31 TYPE 2 DIABETES MELLITUS WITH STAGE 3A CHRONIC KIDNEY DISEASE, WITHOUT LONG-TERM CURRENT USE OF INSULIN (H): ICD-10-CM

## 2024-09-28 LAB
ALBUMIN SERPL BCG-MCNC: 3.9 G/DL (ref 3.5–5.2)
ALP SERPL-CCNC: 105 U/L (ref 40–150)
ALT SERPL W P-5'-P-CCNC: 26 U/L (ref 0–50)
ANION GAP SERPL CALCULATED.3IONS-SCNC: 14 MMOL/L (ref 7–15)
AST SERPL W P-5'-P-CCNC: 47 U/L (ref 0–45)
BILIRUB SERPL-MCNC: 0.5 MG/DL
BUN SERPL-MCNC: 14.8 MG/DL (ref 8–23)
CALCIUM SERPL-MCNC: 9.5 MG/DL (ref 8.8–10.4)
CHLORIDE SERPL-SCNC: 104 MMOL/L (ref 98–107)
CREAT SERPL-MCNC: 0.85 MG/DL (ref 0.51–0.95)
CREAT UR-MCNC: 85.5 MG/DL
EGFRCR SERPLBLD CKD-EPI 2021: 65 ML/MIN/1.73M2
EST. AVERAGE GLUCOSE BLD GHB EST-MCNC: 123 MG/DL
GLUCOSE SERPL-MCNC: 133 MG/DL (ref 70–99)
HBA1C MFR BLD: 5.9 % (ref 0–5.6)
HCO3 SERPL-SCNC: 22 MMOL/L (ref 22–29)
MICROALBUMIN UR-MCNC: 29 MG/L
MICROALBUMIN/CREAT UR: 33.92 MG/G CR (ref 0–25)
POTASSIUM SERPL-SCNC: 4.7 MMOL/L (ref 3.4–5.3)
PROT SERPL-MCNC: 7.9 G/DL (ref 6.4–8.3)
SODIUM SERPL-SCNC: 140 MMOL/L (ref 135–145)

## 2024-09-28 PROCEDURE — 83036 HEMOGLOBIN GLYCOSYLATED A1C: CPT

## 2024-09-28 PROCEDURE — 82043 UR ALBUMIN QUANTITATIVE: CPT

## 2024-09-28 PROCEDURE — 80053 COMPREHEN METABOLIC PANEL: CPT

## 2024-09-28 PROCEDURE — 82570 ASSAY OF URINE CREATININE: CPT

## 2024-09-28 PROCEDURE — 36415 COLL VENOUS BLD VENIPUNCTURE: CPT

## 2024-10-21 ENCOUNTER — VIRTUAL VISIT (OUTPATIENT)
Dept: INTERNAL MEDICINE | Facility: CLINIC | Age: 89
End: 2024-10-21
Payer: MEDICARE

## 2024-10-21 DIAGNOSIS — E11.29 TYPE 2 DIABETES MELLITUS WITH DIABETIC MICROALBUMINURIA, WITHOUT LONG-TERM CURRENT USE OF INSULIN (H): ICD-10-CM

## 2024-10-21 DIAGNOSIS — F32.0 MILD MAJOR DEPRESSION (H): ICD-10-CM

## 2024-10-21 DIAGNOSIS — R80.9 TYPE 2 DIABETES MELLITUS WITH DIABETIC MICROALBUMINURIA, WITHOUT LONG-TERM CURRENT USE OF INSULIN (H): ICD-10-CM

## 2024-10-21 DIAGNOSIS — R80.9 PROTEINURIA, UNSPECIFIED TYPE: ICD-10-CM

## 2024-10-21 DIAGNOSIS — K59.00 CONSTIPATION, UNSPECIFIED CONSTIPATION TYPE: ICD-10-CM

## 2024-10-21 DIAGNOSIS — E78.5 HYPERLIPIDEMIA LDL GOAL <100: Primary | ICD-10-CM

## 2024-10-21 PROCEDURE — 99214 OFFICE O/P EST MOD 30 MIN: CPT | Mod: 95 | Performed by: INTERNAL MEDICINE

## 2024-10-21 NOTE — PROGRESS NOTES
Janet is a 89 year old who is being evaluated via a billable video visit.    How would you like to obtain your AVS? [employer]  If the video visit is dropped, the invitation should be resent by: Text to cell phone: [phone]  Will anyone else be joining your video visit? No      ASSESSMENT:   1. Type 2 diabetes mellitus with diabetic microalbuminuria, without long-term current use of insulin (H)  Controlled.  Continue current diet.  Repeat lab 6 months  - Hemoglobin A1c; Future  - Comprehensive metabolic panel; Future    2. Constipation, unspecified constipation type  Stools too soft.  Will decrease intake of prunes while continuing bulking Metamucil.    3. Mild major depression (H)  Patient feels symptoms controlled with medication now still does not wish to go outside of her apartment except for medical appointments.  Patient states she is very content inside her apartment.  Continue current medications.  Declines counseling    4. Proteinuria, unspecified type  Mild proteinuria.  On maximum dose ARB and blood sugars at good control.  Recent renal function/GFR normal.  Repeat labs 6 months  - Comprehensive metabolic panel; Future  - Albumin Random Urine Quantitative with Creat Ratio; Future    5. HYPERLIPIDEMIA LDL GOAL <100 (Primary)  On statin.  Previous lipids at goal.  Repeat with months  - Comprehensive metabolic panel; Future  - Lipid panel reflex to direct LDL Fasting; Future        PLAN:  Continue current dose of Metamucil.  Reduce prune intake to 1/day  Try to reduce intake of wine to maximum of 2 glasses/day or less  Continue other medications.  Contact your pharmacy when refills are needed  Follow-up with me in 6 months for annual wellness exam and follow-up regarding medical issues or earlier as needed  Call  [phone] or use [employer] to schedule a future lab appointment  fasting 1 week prior to the follow-up appointment with me so I can review lab results with you when seen back in clinic  For  "fasting labs, please refrain from eating for 8 hours or more.   Drink 2 glasses of water before your lab appointment. It is fine to take your  oral medications on the morning of the lab test as usual  Because non-acute appointments to see me in clinic are currently booking out about 3 months at the clinic (for multiple reasons), please use [employer] or call the appointment line now to schedule the future appointment so that it is \"on the books\".       Video-Visit Details    Type of service:  Video Visit    Video Start Time: 5:03 PM    Video End Time: 5:24 PM    Originating Location (pt. Location): Home    Distant Location (provider location):  [facility]     Platform used for Video Visit: Abby Gonzales is a 89 year old, presenting for the following health issues:  Results and Consult (Being able to empty colon)        History of Present Illness       Reason for visit:  Results & emptying colon   She is taking medications regularly.     Most recent lab results reviewed with pt.      Component      Latest Ref Rng 5/23/2024  8:57 AM 5/23/2024  8:59 AM 9/28/2024  9:16 AM 9/28/2024  9:29 AM   Sodium      135 - 145 mmol/L 139   140     Potassium      3.4 - 5.3 mmol/L 4.4   4.7     Carbon Dioxide (CO2)      22 - 29 mmol/L 21 (L)   22     Anion Gap      7 - 15 mmol/L 14   14     Urea Nitrogen      8.0 - 23.0 mg/dL 16.4   14.8     Creatinine      0.51 - 0.95 mg/dL 0.79   0.85     GFR Estimate      >60 mL/min/1.73m2 71   65     Calcium      8.8 - 10.4 mg/dL 9.5   9.5     Chloride      98 - 107 mmol/L 104   104     Glucose      70 - 99 mg/dL 143 (H)   133 (H)     Alkaline Phosphatase      40 - 150 U/L 108   105     AST      0 - 45 U/L 54 (H)   47 (H)     ALT      0 - 50 U/L 30   26     Protein Total      6.4 - 8.3 g/dL 8.0   7.9     Albumin      3.5 - 5.2 g/dL 4.2   3.9     Bilirubin Total      <=1.2 mg/dL 0.5   0.5     Patient Fasting? Yes       Patient Fasting? Yes     "   WBC      4.0 - 11.0 10e3/uL 6.9       RBC Count      3.80 - 5.20 10e6/uL 4.47       Hemoglobin      11.7 - 15.7 g/dL 12.2       Hematocrit      35.0 - 47.0 % 38.5       MCV      78 - 100 fL 86       MCH      26.5 - 33.0 pg 27.3       MCHC      31.5 - 36.5 g/dL 31.7       RDW      10.0 - 15.0 % 15.5 (H)       Platelet Count      150 - 450 10e3/uL 283       Cholesterol      <200 mg/dL 181       Triglycerides      <150 mg/dL 84       HDL Cholesterol      >=50 mg/dL 66       LDL Cholesterol Calculated      <=100 mg/dL 98       Non HDL Cholesterol      <130 mg/dL 115       Creatinine Urine      mg/dL  70.5   85.5    Albumin Urine mg/L      mg/L  27.5   29.0    Albumin Urine mg/g Cr      0.00 - 25.00 mg/g Cr  39.01 (H)   33.92 (H)    Estimated Average Glucose      <117 mg/dL   123 (H)     Hemoglobin A1C      0.0 - 5.6 % 6.1 (H)   5.9 (H)     Parathyroid Hormone Intact      15 - 65 pg/mL 26       TSH      0.30 - 4.20 uIU/mL 1.66             HPI:  Patient currently living in the regular apartment building.  Eating well and daughters are bringing her food also.  Patient enjoys watching sports.  Only going outside of her apartment however to get vaccinations.  Does not go for walks in her building were willing to go visit others outside of her building.  Enjoys talking to people on the phone however.  History depression and anxiety but patient states her mood overall is doing well but just has no motivation to leave her apartment and feels very content staying in it.  Does not wish to go grocery shopping.  Has macular degeneration.  Not reading because of this.  Has glasses.  Still able to see the television.  Denies chest pain, shortness of breath or current abdominal pain.  Has mushy stools at times.  Currently taking Metamucil and 2 prunes a day.  Denies seeing blood in the stools.  No paulette diarrhea.  Usually has a bowel movement every day.  Continues to drink 1 glass of wine with lunch and 1 with dinner and another  glass later in the evening.  Not motivated to decrease alcohol intake.  Denies recent falls.  Sleeping okay at night  Not checking blood sugars at home     Additional ROS:   Constitutional, HEENT, Cardiovascular, Pulmonary, GI and , Neuro, MSK and Psych review of systems/symptoms are otherwise negative or unchanged from previous, except as noted above.           Objective :  No vitals obtained today    Physical Exam:  GENERAL:   alert and no distress  EYES: Eyes grossly normal to inspection, conjunctivae and sclerae normal  RESP: no audible wheeze, cough, or visible cyanosis.  No visible retractions or increased work of breathing.  Able to speak fully in complete sentences.  NEURO: Cranial nerves grossly intact, mentation intact and speech normal  PSYCH: mentation appears normal, affect normal, judgement and insight intact, normal speech and appearance well-groomed       Frank Kovacs MD  Internal Medicine Department  [facility]  Internal Medicine Department      (Chart documentation was completed, in part, with [employer] voice-recognition software. Even though reviewed, some grammatical, spelling, and word errors may remain.)

## 2024-10-28 NOTE — PATIENT INSTRUCTIONS
"Continue current dose of Metamucil.  Reduce prune intake to 1/day  Try to reduce intake of wine to maximum of 2 glasses/day or less  Continue other medications.  Contact your pharmacy when refills are needed  Follow-up with me in late spring 2025 for annual wellness exam and follow-up regarding medical issues or earlier as needed  Because non-acute appointments to see me in clinic are currently booking out about 3 months at the clinic (for multiple reasons), please use [employer] or call the appointment line now to schedule the future appointment so that it is \"on the books\".     "

## 2024-11-10 DIAGNOSIS — I10 ESSENTIAL HYPERTENSION, BENIGN: ICD-10-CM

## 2024-11-11 RX ORDER — AMLODIPINE BESYLATE 10 MG/1
TABLET ORAL
Qty: 90 TABLET | Refills: 0 | Status: SHIPPED | OUTPATIENT
Start: 2024-11-11

## 2024-12-11 DIAGNOSIS — F41.9 ANXIETY: ICD-10-CM

## 2024-12-11 DIAGNOSIS — F32.0 MILD MAJOR DEPRESSION (H): ICD-10-CM

## 2024-12-12 RX ORDER — ESCITALOPRAM OXALATE 10 MG/1
TABLET ORAL
Qty: 90 TABLET | Refills: 3 | Status: SHIPPED | OUTPATIENT
Start: 2024-12-12

## 2024-12-20 ENCOUNTER — NURSE TRIAGE (OUTPATIENT)
Dept: INTERNAL MEDICINE | Facility: CLINIC | Age: 89
End: 2024-12-20

## 2024-12-20 NOTE — TELEPHONE ENCOUNTER
"Pts granddaughter called the clinic. No c2c on file so no information was given out.     Cough started about 6 days ago. Has used mucinex.      Pt now has audible crackles. Pt has chest pain when coughing. Pt is coughing up light yellow to green phlegm. Pt is also fatigued. Pt gets \"winded\" from activity.     Advised pts granddaughter to take pt to  at this time. Pts granddaughter stated they will have to call an ambulance as they can not transport pt/get her to the car. Informed granddaughter that ER is appropriate as well. Pts granddaughter stated thy will have pt brought to ER.       1. ONSET: \"When did the cough begin?\"        Sat  2. SEVERITY: \"How bad is the cough today?\"       Consistent   3. SPUTUM: \"Describe the color of your sputum\" (e.g., none, dry cough; clear, white, yellow, green)      Light yellow to green   4. HEMOPTYSIS: \"Are you coughing up any blood?\" If Yes, ask: \"How much?\" (e.g., flecks, streaks, tablespoons, etc.)      No  5. DIFFICULTY BREATHING: \"Are you having difficulty breathing?\" If Yes, ask: \"How bad is it?\" (e.g., mild, moderate, severe)       Gets winded   6. FEVER: \"Do you have a fever?\" If Yes, ask: \"What is your temperature, how was it measured, and when did it start?\"      99.5  7. CARDIAC HISTORY: \"Do you have any history of heart disease?\" (e.g., heart attack, congestive heart failure)       No  8. LUNG HISTORY: \"Do you have any history of lung disease?\"  (e.g., pulmonary embolus, asthma, emphysema)      No  9. PE RISK FACTORS: \"Do you have a history of blood clots?\" (or: recent major surgery, recent prolonged travel, bedridden)      No  10. OTHER SYMPTOMS: \"Do you have any other symptoms?\" (e.g., runny nose, wheezing, chest pain)        Runny nose. Chest pain from/when coughing   11. PREGNANCY: \"Is there any chance you are pregnant?\" \"When was your last menstrual period?\"        NA  12. TRAVEL: \"Have you traveled out of the country in the last month?\" (e.g., travel history, " exposures)        No  Reason for Disposition   Patient sounds very sick or weak to the triager    Additional Information   Negative: Bluish (or gray) lips or face   Negative: SEVERE difficulty breathing (e.g., struggling for each breath, speaks in single words)   Negative: Rapid onset of cough and has hives   Negative: Coughing started suddenly after medicine, an allergic food or bee sting   Negative: Difficulty breathing after exposure to flames, smoke, or fumes   Negative: Sounds like a life-threatening emergency to the triager   Negative: Previous asthma attacks and this feels like asthma attack   Negative: Dry cough (non-productive; no sputum or minimal clear sputum) and within 14 days of COVID-19 Exposure   Negative: MODERATE difficulty breathing (e.g., speaks in phrases, SOB even at rest, pulse 100-120) and still present when not coughing   Negative: Chest pain present when not coughing   Negative: Passed out (e.g., fainted, lost consciousness, blacked out and was not responding)    Protocols used: Cough-A-OH

## 2024-12-21 ENCOUNTER — HOSPITAL ENCOUNTER (INPATIENT)
Facility: CLINIC | Age: 89
End: 2024-12-21
Attending: EMERGENCY MEDICINE | Admitting: INTERNAL MEDICINE
Payer: MEDICARE

## 2024-12-21 ENCOUNTER — APPOINTMENT (OUTPATIENT)
Dept: GENERAL RADIOLOGY | Facility: CLINIC | Age: 89
End: 2024-12-21
Attending: EMERGENCY MEDICINE
Payer: MEDICARE

## 2024-12-21 DIAGNOSIS — R09.02 HYPOXEMIA: ICD-10-CM

## 2024-12-21 DIAGNOSIS — J10.1 INFLUENZA A: ICD-10-CM

## 2024-12-21 DIAGNOSIS — G47.00 INSOMNIA, UNSPECIFIED TYPE: ICD-10-CM

## 2024-12-21 DIAGNOSIS — Z71.89 GOALS OF CARE, COUNSELING/DISCUSSION: ICD-10-CM

## 2024-12-21 DIAGNOSIS — R80.9 PROTEINURIA, UNSPECIFIED TYPE: ICD-10-CM

## 2024-12-21 DIAGNOSIS — N18.31 STAGE 3A CHRONIC KIDNEY DISEASE (H): Primary | ICD-10-CM

## 2024-12-21 DIAGNOSIS — I10 HYPERTENSION, UNSPECIFIED TYPE: ICD-10-CM

## 2024-12-21 LAB
ANION GAP SERPL CALCULATED.3IONS-SCNC: 13 MMOL/L (ref 7–15)
BACTERIA SPT CULT: NORMAL
BASE EXCESS BLDV CALC-SCNC: -1 MMOL/L (ref -3–3)
BASOPHILS # BLD AUTO: 0 10E3/UL (ref 0–0.2)
BASOPHILS NFR BLD AUTO: 0 %
BUN SERPL-MCNC: 22.4 MG/DL (ref 8–23)
CALCIUM SERPL-MCNC: 9.1 MG/DL (ref 8.8–10.4)
CHLORIDE SERPL-SCNC: 95 MMOL/L (ref 98–107)
CREAT SERPL-MCNC: 1.04 MG/DL (ref 0.51–0.95)
EGFRCR SERPLBLD CKD-EPI 2021: 51 ML/MIN/1.73M2
ELLIPTOCYTES BLD QL SMEAR: SLIGHT
EOSINOPHIL # BLD AUTO: 0 10E3/UL (ref 0–0.7)
EOSINOPHIL NFR BLD AUTO: 0 %
ERYTHROCYTE [DISTWIDTH] IN BLOOD BY AUTOMATED COUNT: 17.9 % (ref 10–15)
ETHANOL SERPL-MCNC: <0.01 G/DL
FLUAV RNA SPEC QL NAA+PROBE: POSITIVE
FLUBV RNA RESP QL NAA+PROBE: NEGATIVE
GIANT PLATELETS BLD QL SMEAR: SLIGHT
GLUCOSE BLDC GLUCOMTR-MCNC: 106 MG/DL (ref 70–99)
GLUCOSE BLDC GLUCOMTR-MCNC: 129 MG/DL (ref 70–99)
GLUCOSE SERPL-MCNC: 127 MG/DL (ref 70–99)
GRAM STAIN RESULT: NORMAL
HCO3 BLDV-SCNC: 23 MMOL/L (ref 21–28)
HCO3 SERPL-SCNC: 23 MMOL/L (ref 22–29)
HCT VFR BLD AUTO: 33.2 % (ref 35–47)
HGB BLD-MCNC: 10.7 G/DL (ref 11.7–15.7)
HOLD SPECIMEN: NORMAL
IMM GRANULOCYTES # BLD: 0.1 10E3/UL
IMM GRANULOCYTES NFR BLD: 1 %
LACTATE BLD-SCNC: 1.3 MMOL/L
LYMPHOCYTES # BLD AUTO: 0.8 10E3/UL (ref 0.8–5.3)
LYMPHOCYTES NFR BLD AUTO: 8 %
MCH RBC QN AUTO: 25.5 PG (ref 26.5–33)
MCHC RBC AUTO-ENTMCNC: 32.2 G/DL (ref 31.5–36.5)
MCV RBC AUTO: 79 FL (ref 78–100)
MONOCYTES # BLD AUTO: 0.9 10E3/UL (ref 0–1.3)
MONOCYTES NFR BLD AUTO: 9 %
MRSA DNA SPEC QL NAA+PROBE: NEGATIVE
NEUTROPHILS # BLD AUTO: 8.5 10E3/UL (ref 1.6–8.3)
NEUTROPHILS NFR BLD AUTO: 83 %
NRBC # BLD AUTO: 0 10E3/UL
NRBC BLD AUTO-RTO: 0 /100
PCO2 BLDV: 33 MM HG (ref 40–50)
PH BLDV: 7.44 [PH] (ref 7.32–7.43)
PLAT MORPH BLD: ABNORMAL
PLATELET # BLD AUTO: 240 10E3/UL (ref 150–450)
PO2 BLDV: 26 MM HG (ref 25–47)
POLYCHROMASIA BLD QL SMEAR: SLIGHT
POTASSIUM SERPL-SCNC: 3.9 MMOL/L (ref 3.4–5.3)
PROCALCITONIN SERPL IA-MCNC: 1.32 NG/ML
RBC # BLD AUTO: 4.2 10E6/UL (ref 3.8–5.2)
RBC MORPH BLD: ABNORMAL
RSV RNA SPEC NAA+PROBE: NEGATIVE
SA TARGET DNA: NEGATIVE
SAO2 % BLDV: 51 % (ref 70–75)
SARS-COV-2 RNA RESP QL NAA+PROBE: NEGATIVE
SODIUM SERPL-SCNC: 131 MMOL/L (ref 135–145)
TOXIC GRANULES BLD QL SMEAR: PRESENT
VARIANT LYMPHS BLD QL SMEAR: PRESENT
WBC # BLD AUTO: 10.3 10E3/UL (ref 4–11)

## 2024-12-21 PROCEDURE — 87641 MR-STAPH DNA AMP PROBE: CPT | Performed by: INTERNAL MEDICINE

## 2024-12-21 PROCEDURE — 99223 1ST HOSP IP/OBS HIGH 75: CPT | Mod: AI | Performed by: INTERNAL MEDICINE

## 2024-12-21 PROCEDURE — 250N000013 HC RX MED GY IP 250 OP 250 PS 637: Performed by: INTERNAL MEDICINE

## 2024-12-21 PROCEDURE — 99285 EMERGENCY DEPT VISIT HI MDM: CPT | Mod: 25

## 2024-12-21 PROCEDURE — 82803 BLOOD GASES ANY COMBINATION: CPT

## 2024-12-21 PROCEDURE — 250N000011 HC RX IP 250 OP 636: Performed by: INTERNAL MEDICINE

## 2024-12-21 PROCEDURE — 87637 SARSCOV2&INF A&B&RSV AMP PRB: CPT | Performed by: EMERGENCY MEDICINE

## 2024-12-21 PROCEDURE — 258N000003 HC RX IP 258 OP 636: Performed by: INTERNAL MEDICINE

## 2024-12-21 PROCEDURE — 83605 ASSAY OF LACTIC ACID: CPT

## 2024-12-21 PROCEDURE — 258N000003 HC RX IP 258 OP 636: Performed by: EMERGENCY MEDICINE

## 2024-12-21 PROCEDURE — 87070 CULTURE OTHR SPECIMN AEROBIC: CPT | Performed by: INTERNAL MEDICINE

## 2024-12-21 PROCEDURE — HZ2ZZZZ DETOXIFICATION SERVICES FOR SUBSTANCE ABUSE TREATMENT: ICD-10-PCS | Performed by: INTERNAL MEDICINE

## 2024-12-21 PROCEDURE — 250N000011 HC RX IP 250 OP 636: Performed by: EMERGENCY MEDICINE

## 2024-12-21 PROCEDURE — 80048 BASIC METABOLIC PNL TOTAL CA: CPT | Performed by: EMERGENCY MEDICINE

## 2024-12-21 PROCEDURE — 87640 STAPH A DNA AMP PROBE: CPT | Performed by: INTERNAL MEDICINE

## 2024-12-21 PROCEDURE — 120N000001 HC R&B MED SURG/OB

## 2024-12-21 PROCEDURE — 250N000013 HC RX MED GY IP 250 OP 250 PS 637: Performed by: EMERGENCY MEDICINE

## 2024-12-21 PROCEDURE — 36415 COLL VENOUS BLD VENIPUNCTURE: CPT | Performed by: EMERGENCY MEDICINE

## 2024-12-21 PROCEDURE — 82077 ASSAY SPEC XCP UR&BREATH IA: CPT | Performed by: EMERGENCY MEDICINE

## 2024-12-21 PROCEDURE — 85018 HEMOGLOBIN: CPT | Performed by: EMERGENCY MEDICINE

## 2024-12-21 PROCEDURE — 87040 BLOOD CULTURE FOR BACTERIA: CPT | Performed by: EMERGENCY MEDICINE

## 2024-12-21 PROCEDURE — 71046 X-RAY EXAM CHEST 2 VIEWS: CPT

## 2024-12-21 PROCEDURE — 82435 ASSAY OF BLOOD CHLORIDE: CPT | Performed by: EMERGENCY MEDICINE

## 2024-12-21 PROCEDURE — 85025 COMPLETE CBC W/AUTO DIFF WBC: CPT | Performed by: EMERGENCY MEDICINE

## 2024-12-21 PROCEDURE — 99291 CRITICAL CARE FIRST HOUR: CPT | Mod: 25

## 2024-12-21 PROCEDURE — 84145 PROCALCITONIN (PCT): CPT | Performed by: EMERGENCY MEDICINE

## 2024-12-21 RX ORDER — OSELTAMIVIR PHOSPHATE 30 MG/1
30 CAPSULE ORAL 2 TIMES DAILY
Status: COMPLETED | OUTPATIENT
Start: 2024-12-21 | End: 2024-12-25

## 2024-12-21 RX ORDER — POLYETHYLENE GLYCOL 3350 17 G/17G
17 POWDER, FOR SOLUTION ORAL 2 TIMES DAILY PRN
Status: ACTIVE | OUTPATIENT
Start: 2024-12-21

## 2024-12-21 RX ORDER — IBUPROFEN 200 MG
200 TABLET ORAL EVERY 4 HOURS PRN
COMMUNITY
End: 2024-12-21

## 2024-12-21 RX ORDER — SODIUM CHLORIDE, SODIUM LACTATE, POTASSIUM CHLORIDE, CALCIUM CHLORIDE 600; 310; 30; 20 MG/100ML; MG/100ML; MG/100ML; MG/100ML
INJECTION, SOLUTION INTRAVENOUS CONTINUOUS
Status: DISCONTINUED | OUTPATIENT
Start: 2024-12-21 | End: 2024-12-22

## 2024-12-21 RX ORDER — CEFTRIAXONE 1 G/1
1 INJECTION, POWDER, FOR SOLUTION INTRAMUSCULAR; INTRAVENOUS ONCE
Status: COMPLETED | OUTPATIENT
Start: 2024-12-21 | End: 2024-12-21

## 2024-12-21 RX ORDER — PROCHLORPERAZINE MALEATE 5 MG/1
5 TABLET ORAL EVERY 6 HOURS PRN
Status: ACTIVE | OUTPATIENT
Start: 2024-12-21

## 2024-12-21 RX ORDER — FLUMAZENIL 0.1 MG/ML
0.2 INJECTION, SOLUTION INTRAVENOUS
Status: DISCONTINUED | OUTPATIENT
Start: 2024-12-21 | End: 2024-12-22

## 2024-12-21 RX ORDER — ENOXAPARIN SODIUM 100 MG/ML
40 INJECTION SUBCUTANEOUS EVERY 24 HOURS
Status: DISCONTINUED | OUTPATIENT
Start: 2024-12-21 | End: 2024-12-26

## 2024-12-21 RX ORDER — MULTIVITAMIN,THERAPEUTIC
1 TABLET ORAL DAILY
COMMUNITY
End: 2024-12-21

## 2024-12-21 RX ORDER — AMOXICILLIN 250 MG
1 CAPSULE ORAL 2 TIMES DAILY PRN
Status: ACTIVE | OUTPATIENT
Start: 2024-12-21

## 2024-12-21 RX ORDER — HYDRALAZINE HYDROCHLORIDE 10 MG/1
10 TABLET, FILM COATED ORAL EVERY 4 HOURS PRN
Status: ACTIVE | OUTPATIENT
Start: 2024-12-21

## 2024-12-21 RX ORDER — HALOPERIDOL 5 MG/ML
2.5-5 INJECTION INTRAMUSCULAR EVERY 6 HOURS PRN
Status: ACTIVE | OUTPATIENT
Start: 2024-12-21

## 2024-12-21 RX ORDER — AZITHROMYCIN MONOHYDRATE 500 MG/5ML
500 INJECTION, POWDER, LYOPHILIZED, FOR SOLUTION INTRAVENOUS ONCE
Status: COMPLETED | OUTPATIENT
Start: 2024-12-21 | End: 2024-12-21

## 2024-12-21 RX ORDER — DEXTROSE MONOHYDRATE 25 G/50ML
25-50 INJECTION, SOLUTION INTRAVENOUS
Status: ACTIVE | OUTPATIENT
Start: 2024-12-21

## 2024-12-21 RX ORDER — LORAZEPAM 1 MG/1
1-2 TABLET ORAL EVERY 30 MIN PRN
Status: DISPENSED | OUTPATIENT
Start: 2024-12-21

## 2024-12-21 RX ORDER — OLANZAPINE 5 MG/1
5-10 TABLET, ORALLY DISINTEGRATING ORAL EVERY 6 HOURS PRN
Status: DISPENSED | OUTPATIENT
Start: 2024-12-21

## 2024-12-21 RX ORDER — ACETAMINOPHEN 650 MG/1
650 SUPPOSITORY RECTAL EVERY 4 HOURS PRN
Status: ACTIVE | OUTPATIENT
Start: 2024-12-21

## 2024-12-21 RX ORDER — HYDRALAZINE HYDROCHLORIDE 20 MG/ML
10 INJECTION INTRAMUSCULAR; INTRAVENOUS EVERY 4 HOURS PRN
Status: ACTIVE | OUTPATIENT
Start: 2024-12-21

## 2024-12-21 RX ORDER — OSELTAMIVIR PHOSPHATE 75 MG/1
75 CAPSULE ORAL ONCE
Status: COMPLETED | OUTPATIENT
Start: 2024-12-21 | End: 2024-12-21

## 2024-12-21 RX ORDER — AZITHROMYCIN 250 MG/1
250 TABLET, FILM COATED ORAL DAILY
Status: COMPLETED | OUTPATIENT
Start: 2024-12-22 | End: 2024-12-25

## 2024-12-21 RX ORDER — MULTIPLE VITAMINS W/ MINERALS TAB 9MG-400MCG
1 TAB ORAL DAILY
Status: DISCONTINUED | OUTPATIENT
Start: 2024-12-21 | End: 2024-12-23

## 2024-12-21 RX ORDER — AMOXICILLIN 250 MG
2 CAPSULE ORAL 2 TIMES DAILY PRN
Status: ACTIVE | OUTPATIENT
Start: 2024-12-21

## 2024-12-21 RX ORDER — LORAZEPAM 2 MG/ML
1-2 INJECTION INTRAMUSCULAR EVERY 30 MIN PRN
Status: ACTIVE | OUTPATIENT
Start: 2024-12-21

## 2024-12-21 RX ORDER — FLUMAZENIL 0.1 MG/ML
0.2 INJECTION, SOLUTION INTRAVENOUS
Status: ACTIVE | OUTPATIENT
Start: 2024-12-21

## 2024-12-21 RX ORDER — ONDANSETRON 4 MG/1
4 TABLET, ORALLY DISINTEGRATING ORAL EVERY 6 HOURS PRN
Status: ACTIVE | OUTPATIENT
Start: 2024-12-21

## 2024-12-21 RX ORDER — NICOTINE POLACRILEX 4 MG
15-30 LOZENGE BUCCAL
Status: ACTIVE | OUTPATIENT
Start: 2024-12-21

## 2024-12-21 RX ORDER — FOLIC ACID 1 MG/1
1 TABLET ORAL DAILY
Status: DISCONTINUED | OUTPATIENT
Start: 2024-12-21 | End: 2024-12-23

## 2024-12-21 RX ORDER — LIDOCAINE 40 MG/G
CREAM TOPICAL
Status: ACTIVE | OUTPATIENT
Start: 2024-12-21

## 2024-12-21 RX ORDER — ACETAMINOPHEN 325 MG/1
650 TABLET ORAL EVERY 4 HOURS PRN
Status: DISPENSED | OUTPATIENT
Start: 2024-12-21

## 2024-12-21 RX ORDER — CEFTRIAXONE 2 G/1
2 INJECTION, POWDER, FOR SOLUTION INTRAMUSCULAR; INTRAVENOUS EVERY 24 HOURS
Status: DISCONTINUED | OUTPATIENT
Start: 2024-12-22 | End: 2024-12-26

## 2024-12-21 RX ORDER — ONDANSETRON 2 MG/ML
4 INJECTION INTRAMUSCULAR; INTRAVENOUS EVERY 6 HOURS PRN
Status: ACTIVE | OUTPATIENT
Start: 2024-12-21

## 2024-12-21 RX ADMIN — SODIUM CHLORIDE, POTASSIUM CHLORIDE, SODIUM LACTATE AND CALCIUM CHLORIDE: 600; 310; 30; 20 INJECTION, SOLUTION INTRAVENOUS at 14:56

## 2024-12-21 RX ADMIN — VANCOMYCIN HYDROCHLORIDE 1250 MG: 10 INJECTION, POWDER, LYOPHILIZED, FOR SOLUTION INTRAVENOUS at 09:40

## 2024-12-21 RX ADMIN — CEFTRIAXONE SODIUM 1 G: 1 INJECTION, POWDER, FOR SOLUTION INTRAMUSCULAR; INTRAVENOUS at 08:12

## 2024-12-21 RX ADMIN — FOLIC ACID 1 MG: 1 TABLET ORAL at 17:35

## 2024-12-21 RX ADMIN — Medication 1 TABLET: at 17:35

## 2024-12-21 RX ADMIN — OSELTAMIVIR PHOSPHATE 30 MG: 30 CAPSULE ORAL at 19:57

## 2024-12-21 RX ADMIN — AZITHROMYCIN MONOHYDRATE 500 MG: 500 INJECTION, POWDER, LYOPHILIZED, FOR SOLUTION INTRAVENOUS at 08:32

## 2024-12-21 RX ADMIN — OSELTAMIVIR PHOSPHATE 75 MG: 75 CAPSULE ORAL at 06:36

## 2024-12-21 RX ADMIN — THIAMINE HCL TAB 100 MG 100 MG: 100 TAB at 17:35

## 2024-12-21 RX ADMIN — LORAZEPAM 1 MG: 1 TABLET ORAL at 22:03

## 2024-12-21 RX ADMIN — Medication 1 LOZENGE: at 22:03

## 2024-12-21 RX ADMIN — SODIUM CHLORIDE, POTASSIUM CHLORIDE, SODIUM LACTATE AND CALCIUM CHLORIDE: 600; 310; 30; 20 INJECTION, SOLUTION INTRAVENOUS at 23:28

## 2024-12-21 RX ADMIN — Medication 1 LOZENGE: at 19:57

## 2024-12-21 RX ADMIN — ACETAMINOPHEN 650 MG: 325 TABLET, FILM COATED ORAL at 22:03

## 2024-12-21 RX ADMIN — ENOXAPARIN SODIUM 40 MG: 40 INJECTION SUBCUTANEOUS at 17:35

## 2024-12-21 ASSESSMENT — ACTIVITIES OF DAILY LIVING (ADL)
ADLS_ACUITY_SCORE: 41
ADLS_ACUITY_SCORE: 46
ADLS_ACUITY_SCORE: 41
ADLS_ACUITY_SCORE: 46
ADLS_ACUITY_SCORE: 46
ADLS_ACUITY_SCORE: 41
ADLS_ACUITY_SCORE: 41
ADLS_ACUITY_SCORE: 37
ADLS_ACUITY_SCORE: 37
ADLS_ACUITY_SCORE: 41
ADLS_ACUITY_SCORE: 41
ADLS_ACUITY_SCORE: 46
ADLS_ACUITY_SCORE: 41

## 2024-12-21 NOTE — PROGRESS NOTES
RECEIVING UNIT ED HANDOFF REVIEW    ED Nurse Handoff Report was reviewed by: Christianne Chadwick RN on December 21, 2024 at 2:24 PM

## 2024-12-21 NOTE — ED NOTES
Bed: ED13  Expected date:   Expected time:   Means of arrival:   Comments:  444 89F pneumonia. Res-30

## 2024-12-21 NOTE — ED PROVIDER NOTES
"  Emergency Department Note      History of Present Illness     Chief Complaint   Shortness of Breath      HPI   Janet Cope is a 89 year old female with history of CKD, hypertension, and type 2 diabetes who presents for evaluation of shortness of breath. Patient reports that she has been sick for the past week since 12/14 with a productive cough, loss of appetite, headache, difficulty sleeping, and shortness of breath that worsened last night prompting her to come into the ED this morning. She has been taking tylenol and ibuprofen for palliation and most recently took 2 dayquil and an acetaminophen at 2200 last night. Patient lives at home with her 2 daughters.       Independent Historian   None      Past Medical History     Medical History and Problem List   Anxiety  Basal cell carcinoma  Stage 3 CKD  Depression  Diverticulosis of colon  Enthesopathy of hip region  Essential hypertension, benign  Generalized osteoarthritis  Macular degeneration  Osteoporosis  Other specific gastritis  Colonic polyps  Malignant neoplasm of breast   Postmenopausal bleeding  Type 2 diabetes   Hemorrhoids       Medications   Prilosec  Losartan  Robitussin  Lexapro  Buspar  Lipitor  Aspirin 81 mg  Amlodipine      Surgical History   Vaginal hysterectomy  Stress echo  Breast lumpectomy bilateral  Breast radiation tx bilateral   Cataract extraction      Physical Exam     Patient Vitals for the past 24 hrs:   BP Temp Temp src Pulse Resp SpO2 Height Weight   12/21/24 0600 (!) 148/65 -- -- -- -- -- -- --   12/21/24 0530 (!) 145/62 -- -- 98 (!) 7 93 % -- --   12/21/24 0500 (!) 148/63 -- -- 98 22 93 % -- --   12/21/24 0450 -- -- -- -- -- 92 % -- --   12/21/24 0448 -- -- -- -- -- (!) 86 % -- --   12/21/24 0417 (!) 149/105 -- -- 99 27 92 % 1.626 m (5' 4\") 86.2 kg (190 lb)   12/21/24 0415 -- -- -- 101 21 (!) 89 % -- --   12/21/24 0406 (!) 145/108 99.1  F (37.3  C) Oral 105 24 91 % -- --     Physical Exam  General: Does not appear in acute " distress  Head: No signs of trauma.   CV: Mild tachycardia  Resp: Coarse breath sounds at bases bilaterally with mild increased work of breathing  GI: Soft. There is no tenderness.  No rebound or guarding.  Normal bowel sounds.  MSK: Normal range of motion. no edema.   Neuro: The patient is alert and oriented. Speech normal.  Skin: Skin is warm and dry. No rash noted.   Psych: normal mood and affect. behavior is normal.       Diagnostics     Lab Results   Labs Ordered and Resulted from Time of ED Arrival to Time of ED Departure   BASIC METABOLIC PANEL - Abnormal       Result Value    Sodium 131 (*)     Potassium 3.9      Chloride 95 (*)     Carbon Dioxide (CO2) 23      Anion Gap 13      Urea Nitrogen 22.4      Creatinine 1.04 (*)     GFR Estimate 51 (*)     Calcium 9.1      Glucose 127 (*)    CBC WITH PLATELETS AND DIFFERENTIAL - Abnormal    WBC Count 10.3      RBC Count 4.20      Hemoglobin 10.7 (*)     Hematocrit 33.2 (*)     MCV 79      MCH 25.5 (*)     MCHC 32.2      RDW 17.9 (*)     Platelet Count 240      % Neutrophils 83      % Lymphocytes 8      % Monocytes 9      % Eosinophils 0      % Basophils 0      % Immature Granulocytes 1      NRBCs per 100 WBC 0      Absolute Neutrophils 8.5 (*)     Absolute Lymphocytes 0.8      Absolute Monocytes 0.9      Absolute Eosinophils 0.0      Absolute Basophils 0.0      Absolute Immature Granulocytes 0.1      Absolute NRBCs 0.0     ISTAT GASES LACTATE VENOUS POCT - Abnormal    Lactic Acid POCT 1.3      Bicarbonate Venous POCT 23      O2 Sat, Venous POCT 51 (*)     pCO2 Venous POCT 33 (*)     pH Venous POCT 7.44 (*)     pO2 Venous POCT 26      Base Excess/Deficit (+/-) POCT -1.0     RBC AND PLATELET MORPHOLOGY - Abnormal    RBC Morphology Confirmed RBC Indices      Platelet Assessment   (*)     Value: Automated Count Confirmed. Giant platelets are present.    Giant Platelets Slight (*)     Elliptocytes Slight (*)     Polychromasia Slight (*)     Reactive Lymphocytes Present  (*)     Toxic Neutrophils Present (*)    INFLUENZA A/B, RSV AND SARS-COV2 PCR - Abnormal    Influenza A PCR Positive (*)     Influenza B PCR Negative      RSV PCR Negative      SARS CoV2 PCR Negative     PROCALCITONIN - Abnormal    Procalcitonin 1.32 (*)    ETHYL ALCOHOL LEVEL - Normal    Alcohol ethyl <0.01     BLOOD CULTURE   BLOOD CULTURE       Imaging   XR Chest 2 Views   Final Result   IMPRESSION: Prominent patchy bilateral pulmonary infiltrates, greatest in the mid and lower lungs and more so in the periphery of the lungs, suspicious for pneumonitis. Mild cardiac enlargement. Pulmonary vascularity is borderline increased. No pleural    effusions or pneumothorax. Aortic calcification. Degenerative changes both shoulders.             Independent Interpretation   On my independent interpretation of CXR there is no pneumothorax      ED Course      Medications Administered   Medications   azithromycin (ZITHROMAX) 500 mg vial to attach to  mL bag (has no administration in time range)   cefTRIAXone (ROCEPHIN) 1 g vial to attach to  mL bag for ADULTS or NS 50 mL bag for PEDS (has no administration in time range)   oseltamivir (TAMIFLU) capsule 75 mg (75 mg Oral $Given 12/21/24 0636)       Procedures   Procedures     Discussion of Management   Admitting Hospitalist, Dr. De La Fuente    ED Course   ED Course as of 12/21/24 0722   Sat Dec 21, 2024   0431 I obtained patient history and performed a physical exam.    0621 I spoke with Dr. De La Fuente of the hospitalist service about admission.          Medical Decision Making / Diagnosis     CMS Diagnoses: IV Antibiotics given and/or elevated Lactate of 1.3 and no sepsis note found - Delete this reminder and enter the sepsis note or '.edcms' before signing chart.>>>The     Time zero: 7:28 AM  on 12/21/24 as this was the time when Provider determined that sepsis was present.    Lactic Acid Results:  Recent Labs   Lab Test 12/21/24  0413 12/13/23  1759   LACT 1.3 0.8        3 Hour Bundle 6 Hour Bundle (Reassessment)   Blood Cultures before IV Antibiotics: Yes  Antibiotics given: see below  Prehospital fluid volume (mL):                     Total fluids given (ED +Pre-hospital):  The patient responded to a lesser volume of IV fluids. The initial volume ordered was 100 mL.    Repeat Lactic Acid Level: Not needed  Repeat perfusion exam: I attest to having performed a repeat sepsis exam and assessment of perfusion at 7:28 AM .   BMI Readings from Last 1 Encounters:   12/21/24 32.61 kg/m        Anti-infectives (From admission through now)      Start     Dose/Rate Route Frequency Ordered Stop    12/21/24 0725  azithromycin (ZITHROMAX) 500 mg vial to attach to  mL bag         500 mg  over 1 Hours Intravenous ONCE 12/21/24 0721 12/21/24 0725  cefTRIAXone (ROCEPHIN) 1 g vial to attach to  mL bag for ADULTS or NS 50 mL bag for PEDS         1 g  over 15-30 Minutes Intravenous ONCE 12/21/24 0721      12/21/24 0625  oseltamivir (TAMIFLU) capsule 75 mg         75 mg Oral ONCE 12/21/24 0623 12/21/24 0636                [facility]       None    [facility]   Janet Cope is a 89 year old female presents due to cough, congestion, shortness of breath, and generally not feeling well.  She reports that she began having symptoms nearly a week ago and they have gradually become worse.  Patient was noted to be hypoxic and responded to nasal cannula oxygen.  Patient's physical exam did show some coarse breath sounds at the bases bilaterally.  Blood work was obtained that showed a normal white blood cell count and normal lactic acid.  Influenza A was positive and this would be consistent with her overall presentation.  Chest x-ray did show some signs of infiltrate.  This is likely viral in nature, but given patient's age and elevated procalcitonin, it was decided to cover her with antibiotics in case there is a bacterial component.  Patient was admitted to the hospitalist service given her  hypoxia.    Disposition   The patient was admitted to the hospital.     Diagnosis     ICD-10-CM    1. Influenza A  J10.1       2. Hypoxemia  R09.02                Scribe Disclosure:  I, Roxane Panda, am serving as a scribe at 4:34 AM on 12/21/2024 to document services personally performed by Eliseo Jain MD based on my observations and the provider's statements to me.        Eliseo Jain MD  12/21/24 0720

## 2024-12-21 NOTE — PROGRESS NOTES
Patient seen and examined at bedside.  Patient admitted for flu and bacterial pneumonia.  Continue antibiotics continue Tamiflu    Patient does not appear in respiratory distress.  Bilateral crackles were noted     continue to keep oxygen saturations above 92%    Discussed with the emergency room nurse    Can discontinue vancomycin if MRSA nares was negative    Monitor for alcohol withdrawal      Please look at the history and physical of  for more details    Has been having poor appetite and will order nutrition consult and order Ensure    Updated daughters at bedside

## 2024-12-21 NOTE — PHARMACY-ADMISSION MEDICATION HISTORY
Pharmacist Admission Medication History    Admission medication history is complete. The information provided in this note is only as accurate as the sources available at the time of the update.    Information Source(s): Patient, Daughter, and CareEverywhere/SureScripts via phone    Pertinent Information: none    Changes made to PTA medication list:  Added: None  Deleted: fish oil, MVI  Changed: None    Allergies reviewed with patient and updates made in EHR: no    Medication History Completed By: Romy Porter RPH 12/21/2024 11:45 AM    PTA Med List   Medication Sig Last Dose/Taking    acetaminophen (TYLENOL) 650 MG CR tablet Take 1 tablet (650 mg) by mouth nightly as needed for mild pain or fever Taking As Needed    amLODIPine (NORVASC) 10 MG tablet TAKE 1 TABLET(10 MG) BY MOUTH DAILY IN THE EVENING (Patient taking differently: Take 10 mg by mouth every evening.) 12/20/2024 Evening    aspirin (ASA) 81 MG tablet Take 81 mg by mouth daily. 12/20/2024 Morning    atorvastatin (LIPITOR) 20 MG tablet TAKE 1 TABLET(20 MG) BY MOUTH DAILY 12/20/2024 Morning    busPIRone (BUSPAR) 15 MG tablet Take 0.5 tablets (7.5 mg) by mouth 2 times daily For anxiety 12/20/2024 Evening    escitalopram (LEXAPRO) 10 MG tablet TAKE 1 TABLET(10 MG) BY MOUTH DAILY (Patient taking differently: Take 10 mg by mouth daily.) 12/20/2024 Morning    guaiFENesin-codeine (ROBITUSSIN AC) 100-10 MG/5ML solution Take 5-10 mLs by mouth every 6 hours as needed for cough Taking As Needed    losartan (COZAAR) 100 MG tablet Take 1 tablet (100 mg) by mouth daily 12/20/2024 Morning    NEW MED One Touch Lancet device Taking    omeprazole (PRILOSEC) 20 MG DR capsule TAKE 1 CAPSULE(20 MG) BY MOUTH TWICE DAILY (Patient taking differently: Take 20 mg by mouth 2 times daily.) 12/20/2024 Evening    order for DME One Touch Ultra test strips. Checking sugars daily Taking

## 2024-12-21 NOTE — PHARMACY-VANCOMYCIN DOSING SERVICE
Pharmacy Vancomycin Initial Note  Date of Service 2024  Patient's  1934  89 year old, female    Indication: Community Acquired Pneumonia    Current estimated CrCl = Estimated Creatinine Clearance: 38.2 mL/min (A) (based on SCr of 1.04 mg/dL (H)).    Creatinine for last 3 days  2024:  4:10 AM Creatinine 1.04 mg/dL    Recent Vancomycin Level(s) for last 3 days  No results found for requested labs within last 3 days.      Vancomycin IV Administrations (past 72 hours)        No vancomycin orders with administrations in past 72 hours.                    Nephrotoxins and other renal medications (From now, onward)      Start     Dose/Rate Route Frequency Ordered Stop    24 0800  vancomycin (VANCOCIN) 1,250 mg in 0.9% NaCl 262.5 mL intermittent infusion         1,250 mg  over 90 Minutes Intravenous EVERY 24 HOURS 24 0751              Contrast Orders - past 72 hours (72h ago, onward)      None            [employer] Prediction of Planned Initial Vancomycin Regimen  Loading dose: N/A  Regimen: 1250 mg IV every 24 hours.  Start time: 07:50 on 2024  Exposure target: AUC24 (range)400-600 mg/L.hr   AUC24,ss: 537 mg/L.hr  Probability of AUC24 > 400: 81 %  Ctrough,ss: 17.1 mg/L  Probability of Ctrough,ss > 20: 34 %  Probability of nephrotoxicity (Lodise CECI ): 13 %        Plan:  Start vancomycin  1250 mg IV q24h.   Vancomycin monitoring method: AUC  Vancomycin therapeutic monitoring goal: 400-600 mg*h/L  Pharmacy will check vancomycin levels as appropriate in 1-3 Days.    Serum creatinine levels will be ordered a minimum of twice weekly.      Yelitza Lim, [facility]

## 2024-12-21 NOTE — H&P
[facility]    History and Physical - Hospitalist Service       Date of Admission:  2024    Assessment & Plan   Janet Cope is a 89 year old female with past medical history significant for daily alcohol use, diabetes mellitus type 2 who presents with cough, shortness of breath, headache, poor intake. Admitted on 2024.     Influenza A  Suspected super-imposed community acquired pneumonia   Acute hypoxic respiratory failure   *Presents with productive cough, shortness of breath, headache, poor appetite. Symptom onset .  *Influenza A PCR positive .   *CXR with patchy bilateral pulmonary infiltrates, greatest in mid and lower lungs, suspicious for pneumonitis   *Oxygen saturation 86% on room air.   *Afebrile. WBC and lactic acid normal. Procalcitonin 1.32.  - Oseltamivir to complete 5 day course  - Ceftriaxone IV, azithromycin PO. Will add vancomycin IV as increased MRSA risk post-influenza.  - MRSA swab  - Sputum culture   - Oxygen PRN, wean as tolerated   - IVF with LR  - PT consulted     Diabetes mellitus, type 2, diet-controlled   HgbA1c 5.9% 24. Glucose adequately controlled on BMP.   - HgbA1c  - Medium sliding scale insulin. Consider discontinuation if HgbA1c well-controlled on diet alone and blood sugars not requiring treatment    Alcohol use disorder  *Family reports patient drinks 1 box of wine every 2 days, patient reports 3 glasses daily. Daily use since   2 years ago  *No interest in quitting or cutting back   - CIWA with lorazepam  - MVI, folate, thiamine  - Defer chemical dependency consult as patient not interested. Discussed if patient changes her mind about quitting, resources available.     Cardiac murmur  Patient denies previous knowledge of murmur.   - Echocardiogram     Chronic kidney disease, stage 3  Baseline creatinine 0.9-1.1. Creatinine 1.04 on admission.   - Currently around baseline  - Avoid nephrotoxins  - Monitor BMP   "    Hypertension  - Continue prior to admission amlodipine, losartan when dose confirmed by pharmacy     Depression  Anxiety  - Continue prior to admission escitalopram, buspirone when dose confirmed by pharmacy     GERD  - Continue prior to admission PPI when dose confirmed by pharmacy     Hyperlipidemia  - Continue prior to admission atorvastatin when dose confirmed by pharmacy        Diet: Regular diet  DVT Prophylaxis: Enoxaparin (Lovenox) SQ  Arroyo Catheter: Not present  Code Status: DNR/DNI - Discussed with patient with daughters at bedside     Disposition Plan   Admit to inpatient. Anticipate greater than or equal to 2 midnights prior to discharge.      Medically Ready for Discharge: Anticipated in 2-4 Days    Entered: Erich De La Fuente MD 12/21/2024, 6:20 AM     The patient's care was discussed with the ED provider, patient and patient's two daughters.    Medical Decision Making       81 MINUTES SPENT BY ME on the date of service doing chart review, history, exam, documentation & further activities per the note.      Clinically Significant Risk Factors Present on Admission         # Hyponatremia: Lowest Na = 131 mmol/L in last 2 days, will monitor as appropriate  # Hypochloremia: Lowest Cl = 95 mmol/L in last 2 days, will monitor as appropriate        # Drug Induced Platelet Defect: home medication list includes an antiplatelet medication   # Hypertension: Noted on problem list      # Anemia: based on hgb <11       # Obesity: Estimated body mass index is 32.61 kg/m  as calculated from the following:    Height as of this encounter: 1.626 m (5' 4\").    Weight as of this encounter: 86.2 kg (190 lb).                   Erich De La Fuente MD  [facility]    ______________________________________________________________________    Chief Complaint   Cough, shortness of breath    History of Present Illness   Janet Cope is a 89 year old female who presents with the above chief " complaint.    History is obtained from the patient, discussion with ED provider and review of medical record.  The patient reports beginning 12/14 she has had a cough productive of white sputum, headache, fatigue, poor intake, shortness of breath, chills.  No measured fevers.  She has been sleeping very poorly as a result of her symptoms.  She had been attempting to manage her symptoms with over-the-counter medications without improvement, eventually with worsening symptoms the evening of 12/20 prompting evaluation in the emergency department.    In the Emergency Department, the patient was seen by Dr. Jain, with whom I discussed the patient's presenting symptoms and emergency department course.  Initial vital signs were a temperature of 99.1F, , /108, RR 24, SpO2 as low as 86% on room air. Pertinent work-up as noted in A&P. The patient received oseltamivir and Hospitalists were contacted for admission to the hospital.     Past Medical History    I have reviewed this patient's medical history and updated it with pertinent information if needed.   Past Medical History:   Diagnosis Date    Anxiety     Basal cell carcinoma     CKD (chronic kidney disease) stage 3, GFR 30-59 ml/min (H)     Depression     Diverticulosis of colon (without mention of hemorrhage) 8/02    colonoscopy negative other than sigmoid diverticulosis    Enthesopathy of hip region     Bilateral    Essential hypertension, benign     Generalized osteoarthrosis, unspecified site     Macular degeneration     Macular degeneration, dry     Osteoporosis, unspecified     Other specified gastritis without mention of hemorrhage     Personal history of colonic polyps 7/03     had hyperplastic polyps 2013    Personal history of malignant neoplasm of breast 1995 & 9/01    left breast cancer (1995), right breast cancer (9/01); patient sees Dr. Martinez regularly    Postmenopausal bleeding 5/02    endometrial biopsies negative; s/p D & C    Type 2  diabetes mellitus with diabetic chronic kidney disease  (goal A1C<7) 10/24/2015    Unspecified hemorrhoids without mention of complication 10/05    Internal, s/p banding ligature       Past Surgical History   I have reviewed this patient's surgical history and updated it with pertinent information if needed.  Past Surgical History:   Procedure Laterality Date    BREAST LUMPECTOMY, RT/LT      Breast Lumpectomy RT    BREAST LUMPECTOMY, RT/LT  2001    left    BREAST RADIATION TX RT/LT      right     BREAST RADIATION TX RT/LT  2001    left    HYSTERECTOMY, VAGINAL      STRESS ECHO ([facility])  2002    normal         Social History   I have reviewed this patient's social history and updated it with pertinent information if needed.  Social History     Tobacco Use    Smoking status: Former     Current packs/day: 0.00     Types: Cigarettes     Quit date: 10/10/1981     Years since quittin.2    Smokeless tobacco: Never   Vaping Use    Vaping status: Never Used   Substance Use Topics    Alcohol use: Yes     Alcohol/week: 21.0 - 28.0 standard drinks of alcohol     Types: 21 - 28 Glasses of wine per week     Comment: 3-4 glass of wine daily    Drug use: No        Family History   I have reviewed this patient's family history and updated it with pertinent information if needed.   Family History   Problem Relation Age of Onset    Diabetes Mother             Heart Disease Mother         Prior to Admission Medications  - Pending pharmacy reconciliation   Prior to Admission Medications   Prescriptions Last Dose Informant Patient Reported? Taking?   ASPIRIN 81 MG OR TABS   Yes No   Si tab po QD (Once per day)   FISH OIL 1000 MG OR CAPS   No No   Sig: 3 qd   Multiple Vitamins-Minerals (VITAMIN D3 COMPLETE PO)   Yes No   NEW MED   No No   Sig: One Touch Lancet device   acetaminophen (TYLENOL) 650 MG CR tablet   Yes No   Sig: Take 1 tablet (650 mg) by mouth nightly as needed for mild pain or fever    amLODIPine (NORVASC) 10 MG tablet   No No   Sig: TAKE 1 TABLET(10 MG) BY MOUTH DAILY IN THE EVENING   atorvastatin (LIPITOR) 20 MG tablet   No No   Sig: TAKE 1 TABLET(20 MG) BY MOUTH DAILY   busPIRone (BUSPAR) 15 MG tablet   No No   Sig: Take 0.5 tablets (7.5 mg) by mouth 2 times daily For anxiety   escitalopram (LEXAPRO) 10 MG tablet   No No   Sig: TAKE 1 TABLET(10 MG) BY MOUTH DAILY   guaiFENesin-codeine (ROBITUSSIN AC) 100-10 MG/5ML solution   No No   Sig: Take 5-10 mLs by mouth every 6 hours as needed for cough   losartan (COZAAR) 100 MG tablet   No No   Sig: Take 1 tablet (100 mg) by mouth daily   omeprazole (PRILOSEC) 20 MG DR capsule   No No   Sig: TAKE 1 CAPSULE(20 MG) BY MOUTH TWICE DAILY   order for DME   No No   Sig: One Touch Ultra test strips. Checking sugars daily      Facility-Administered Medications: None     Allergies   Allergies   Allergen Reactions    Aromasin [Exemestane]      Muscle weakness, pain in legs    Lisinopril      Cough and Dizziness.       Physical Exam   Vital Signs: Temp: 99.1  F (37.3  C) Temp src: Oral BP: (!) 148/65 Pulse: 98   Resp: (!) 7 SpO2: 93 % O2 Device: Nasal cannula Oxygen Delivery: 3 LPM  Weight: 190 lbs 0 oz    Constitutional: NAD  Eyes: PERRL, EOMI   Respiratory: Inspiratory crackles at bilateral bases, no wheezes, normal effort on nasal cannula   Cardiovascular: RRR, III/VI systolic murmur LUSB. No peripheral edema.   GI: Soft, non-tender, non-distended. No rebound tenderness or guarding.   Skin: Warm, dry   Neurologic: Alert. Responding to questions appropriately. Following commands.    Psychiatric: Normal affect, appropriate      Data   Data reviewed today: I reviewed all medications, new labs and imaging results over the last 24 hours. I personally reviewed no images or EKG's today.    Recent Labs   Lab 12/21/24  0410   WBC 10.3   HGB 10.7*   MCV 79      *   POTASSIUM 3.9   CHLORIDE 95*   CO2 23   BUN 22.4   CR 1.04*   ANIONGAP 13   MEL 9.1   GLC  127*       Recent Results (from the past 24 hours)   XR Chest 2 Views    Narrative    EXAM: XR CHEST 2 VIEWS  LOCATION: [facility]  DATE: 12/21/2024    INDICATION: Cough, hypoxia.  COMPARISON: 12/13/2023.      Impression    IMPRESSION: Prominent patchy bilateral pulmonary infiltrates, greatest in the mid and lower lungs and more so in the periphery of the lungs, suspicious for pneumonitis. Mild cardiac enlargement. Pulmonary vascularity is borderline increased. No pleural   effusions or pneumothorax. Aortic calcification. Degenerative changes both shoulders.

## 2024-12-21 NOTE — ED NOTES
"[facility]  ED Nurse Handoff Report    ED Chief complaint: Shortness of Breath      ED Diagnosis:   Final diagnoses:   Influenza A   Hypoxemia       Code Status: to be discussed with provider    Allergies:   Allergies   Allergen Reactions    Aromasin [Exemestane]      Muscle weakness, pain in legs    Lisinopril      Cough and Dizziness.       Patient Story: Patient brought in by EMS from home. Patient reports being sick with cough/shortness of breath for approximately 1 week. Sputum clear per patient. EMS noted patient to be 88% on room air, heart rate in 100's, respiratory rate in 30's. IV placed by EMS. Blood glucose 106 en route to hospital.   Focused Assessment:  A&Ox4, able to make needs known. Lives with daughter who is at bedside. Tachypneic, O2 saturations down to 86% on room air. Patient placed on 3L O2 nasal cannula with oxygen saturations above 90%. Productive cough. States she is \"uncomfortable everywhere\".    Treatments and/or interventions provided:   Patient received 1 dose tamiflu.  Labs Ordered and Resulted from Time of ED Arrival to Time of ED Departure   BASIC METABOLIC PANEL - Abnormal       Result Value    Sodium 131 (*)     Potassium 3.9      Chloride 95 (*)     Carbon Dioxide (CO2) 23      Anion Gap 13      Urea Nitrogen 22.4      Creatinine 1.04 (*)     GFR Estimate 51 (*)     Calcium 9.1      Glucose 127 (*)    CBC WITH PLATELETS AND DIFFERENTIAL - Abnormal    WBC Count 10.3      RBC Count 4.20      Hemoglobin 10.7 (*)     Hematocrit 33.2 (*)     MCV 79      MCH 25.5 (*)     MCHC 32.2      RDW 17.9 (*)     Platelet Count 240      % Neutrophils 83      % Lymphocytes 8      % Monocytes 9      % Eosinophils 0      % Basophils 0      % Immature Granulocytes 1      NRBCs per 100 WBC 0      Absolute Neutrophils 8.5 (*)     Absolute Lymphocytes 0.8      Absolute Monocytes 0.9      Absolute Eosinophils 0.0      Absolute Basophils 0.0      Absolute Immature Granulocytes 0.1      " Absolute NRBCs 0.0     ISTAT GASES LACTATE VENOUS POCT - Abnormal    Lactic Acid POCT 1.3      Bicarbonate Venous POCT 23      O2 Sat, Venous POCT 51 (*)     pCO2 Venous POCT 33 (*)     pH Venous POCT 7.44 (*)     pO2 Venous POCT 26      Base Excess/Deficit (+/-) POCT -1.0     RBC AND PLATELET MORPHOLOGY - Abnormal    RBC Morphology Confirmed RBC Indices      Platelet Assessment   (*)     Value: Automated Count Confirmed. Giant platelets are present.    Giant Platelets Slight (*)     Elliptocytes Slight (*)     Polychromasia Slight (*)     Reactive Lymphocytes Present (*)     Toxic Neutrophils Present (*)    INFLUENZA A/B, RSV AND SARS-COV2 PCR - Abnormal    Influenza A PCR Positive (*)     Influenza B PCR Negative      RSV PCR Negative      SARS CoV2 PCR Negative     ETHYL ALCOHOL LEVEL   PROCALCITONIN     XR Chest 2 Views   Final Result   IMPRESSION: Prominent patchy bilateral pulmonary infiltrates, greatest in the mid and lower lungs and more so in the periphery of the lungs, suspicious for pneumonitis. Mild cardiac enlargement. Pulmonary vascularity is borderline increased. No pleural    effusions or pneumothorax. Aortic calcification. Degenerative changes both shoulders.           Patient's response to treatments and/or interventions: Patient tolerating nasal cannula at 3L, oxygen saturations remain above 90%.     To be done/followed up on inpatient unit:  Continue to follow plan of care.    Does this patient have any cognitive concerns?:  No.    Activity level - Baseline/Home:  Independent  Activity Level - Current:   Stand with Assist    Patient's Preferred language: English   Needed?: No    Isolation: None and Droplet  Infection: Not Applicable  Influenza  Patient tested for COVID 19 prior to admission: YES  Bariatric?: No    Vital Signs:   Vitals:    12/21/24 0450 12/21/24 0500 12/21/24 0530 12/21/24 0600   BP:  (!) 148/63 (!) 145/62 (!) 148/65   Pulse:  98 98    Resp:  22 (!) 7    Temp:        TempSrc:       SpO2: 92% 93% 93%    Weight:       Height:           Cardiac Rhythm:Cardiac Rhythm: Sinus tachycardia    Was the PSS-3 completed:   Yes  What interventions are required if any?  N/a  Family Comments: Daughters at bedside.  OBS brochure/video discussed/provided to patient/family: No              Name of person given brochure if not patient: n/a              Relationship to patient: n/a    For the majority of the shift this patient's behavior was Green.   Behavioral interventions performed were n/a.    ED NURSE PHONE NUMBER: [phone]

## 2024-12-21 NOTE — ED NOTES
Patient's daughters informed writer of patient's alcohol intake. Daughters report patient drinks 1 box of wine every 2 days. Dr. Jain updated.

## 2024-12-21 NOTE — ED TRIAGE NOTES
Patient brought in by EMS from home. Patient reports being sick with cough/shortness of breath for approximately 1 week. Sputum clear per patient. EMS noted patient to be 88% on room air, heart rate in 100's, respiratory rate in 30's. IV placed by EMS. Blood glucose 106 en route to hospital.

## 2024-12-22 ENCOUNTER — APPOINTMENT (OUTPATIENT)
Dept: GENERAL RADIOLOGY | Facility: CLINIC | Age: 89
End: 2024-12-22
Attending: STUDENT IN AN ORGANIZED HEALTH CARE EDUCATION/TRAINING PROGRAM
Payer: MEDICARE

## 2024-12-22 ENCOUNTER — HEALTH MAINTENANCE LETTER (OUTPATIENT)
Age: 89
End: 2024-12-22

## 2024-12-22 ENCOUNTER — APPOINTMENT (OUTPATIENT)
Dept: PHYSICAL THERAPY | Facility: CLINIC | Age: 89
End: 2024-12-22
Attending: INTERNAL MEDICINE
Payer: MEDICARE

## 2024-12-22 LAB
ANION GAP SERPL CALCULATED.3IONS-SCNC: 15 MMOL/L (ref 7–15)
BUN SERPL-MCNC: 20.2 MG/DL (ref 8–23)
CALCIUM SERPL-MCNC: 9 MG/DL (ref 8.8–10.4)
CHLORIDE SERPL-SCNC: 98 MMOL/L (ref 98–107)
CREAT SERPL-MCNC: 0.89 MG/DL (ref 0.51–0.95)
CREAT SERPL-MCNC: 0.89 MG/DL (ref 0.51–0.95)
EGFRCR SERPLBLD CKD-EPI 2021: 61 ML/MIN/1.73M2
EGFRCR SERPLBLD CKD-EPI 2021: 61 ML/MIN/1.73M2
ERYTHROCYTE [DISTWIDTH] IN BLOOD BY AUTOMATED COUNT: 18.2 % (ref 10–15)
GLUCOSE BLDC GLUCOMTR-MCNC: 100 MG/DL (ref 70–99)
GLUCOSE BLDC GLUCOMTR-MCNC: 131 MG/DL (ref 70–99)
GLUCOSE BLDC GLUCOMTR-MCNC: 133 MG/DL (ref 70–99)
GLUCOSE BLDC GLUCOMTR-MCNC: 156 MG/DL (ref 70–99)
GLUCOSE BLDC GLUCOMTR-MCNC: 158 MG/DL (ref 70–99)
GLUCOSE SERPL-MCNC: 145 MG/DL (ref 70–99)
HCO3 SERPL-SCNC: 20 MMOL/L (ref 22–29)
HCT VFR BLD AUTO: 29.8 % (ref 35–47)
HGB BLD-MCNC: 9.3 G/DL (ref 11.7–15.7)
MCH RBC QN AUTO: 25.9 PG (ref 26.5–33)
MCHC RBC AUTO-ENTMCNC: 31.2 G/DL (ref 31.5–36.5)
MCV RBC AUTO: 83 FL (ref 78–100)
NT-PROBNP SERPL-MCNC: 5616 PG/ML (ref 0–1800)
PLATELET # BLD AUTO: 232 10E3/UL (ref 150–450)
POTASSIUM SERPL-SCNC: 3.6 MMOL/L (ref 3.4–5.3)
RBC # BLD AUTO: 3.59 10E6/UL (ref 3.8–5.2)
SODIUM SERPL-SCNC: 133 MMOL/L (ref 135–145)
WBC # BLD AUTO: 13.4 10E3/UL (ref 4–11)

## 2024-12-22 PROCEDURE — 250N000011 HC RX IP 250 OP 636: Performed by: INTERNAL MEDICINE

## 2024-12-22 PROCEDURE — 36415 COLL VENOUS BLD VENIPUNCTURE: CPT | Performed by: INTERNAL MEDICINE

## 2024-12-22 PROCEDURE — 258N000003 HC RX IP 258 OP 636: Performed by: INTERNAL MEDICINE

## 2024-12-22 PROCEDURE — 97162 PT EVAL MOD COMPLEX 30 MIN: CPT | Mod: GP

## 2024-12-22 PROCEDURE — 250N000012 HC RX MED GY IP 250 OP 636 PS 637: Performed by: INTERNAL MEDICINE

## 2024-12-22 PROCEDURE — 120N000001 HC R&B MED SURG/OB

## 2024-12-22 PROCEDURE — 99232 SBSQ HOSP IP/OBS MODERATE 35: CPT | Performed by: STUDENT IN AN ORGANIZED HEALTH CARE EDUCATION/TRAINING PROGRAM

## 2024-12-22 PROCEDURE — 250N000013 HC RX MED GY IP 250 OP 250 PS 637: Performed by: STUDENT IN AN ORGANIZED HEALTH CARE EDUCATION/TRAINING PROGRAM

## 2024-12-22 PROCEDURE — 80048 BASIC METABOLIC PNL TOTAL CA: CPT | Performed by: INTERNAL MEDICINE

## 2024-12-22 PROCEDURE — 250N000009 HC RX 250: Performed by: STUDENT IN AN ORGANIZED HEALTH CARE EDUCATION/TRAINING PROGRAM

## 2024-12-22 PROCEDURE — 250N000011 HC RX IP 250 OP 636: Performed by: STUDENT IN AN ORGANIZED HEALTH CARE EDUCATION/TRAINING PROGRAM

## 2024-12-22 PROCEDURE — 94640 AIRWAY INHALATION TREATMENT: CPT

## 2024-12-22 PROCEDURE — 97530 THERAPEUTIC ACTIVITIES: CPT | Mod: GP

## 2024-12-22 PROCEDURE — 71045 X-RAY EXAM CHEST 1 VIEW: CPT

## 2024-12-22 PROCEDURE — 85014 HEMATOCRIT: CPT | Performed by: INTERNAL MEDICINE

## 2024-12-22 PROCEDURE — 83880 ASSAY OF NATRIURETIC PEPTIDE: CPT | Performed by: STUDENT IN AN ORGANIZED HEALTH CARE EDUCATION/TRAINING PROGRAM

## 2024-12-22 PROCEDURE — 250N000013 HC RX MED GY IP 250 OP 250 PS 637: Performed by: INTERNAL MEDICINE

## 2024-12-22 PROCEDURE — 999N000157 HC STATISTIC RCP TIME EA 10 MIN

## 2024-12-22 RX ORDER — THIAMINE HYDROCHLORIDE 100 MG/ML
100 INJECTION, SOLUTION INTRAMUSCULAR; INTRAVENOUS DAILY
Status: DISPENSED | OUTPATIENT
Start: 2024-12-23

## 2024-12-22 RX ORDER — AMLODIPINE BESYLATE 10 MG/1
10 TABLET ORAL EVERY EVENING
Status: DISPENSED | OUTPATIENT
Start: 2024-12-22

## 2024-12-22 RX ORDER — PANTOPRAZOLE SODIUM 40 MG/1
40 TABLET, DELAYED RELEASE ORAL 2 TIMES DAILY
Status: DISCONTINUED | OUTPATIENT
Start: 2024-12-22 | End: 2024-12-23

## 2024-12-22 RX ORDER — ESCITALOPRAM OXALATE 10 MG/1
10 TABLET ORAL DAILY
Status: DISPENSED | OUTPATIENT
Start: 2024-12-22

## 2024-12-22 RX ORDER — ATORVASTATIN CALCIUM 20 MG/1
20 TABLET, FILM COATED ORAL DAILY
Status: DISPENSED | OUTPATIENT
Start: 2024-12-22

## 2024-12-22 RX ORDER — IPRATROPIUM BROMIDE AND ALBUTEROL SULFATE 2.5; .5 MG/3ML; MG/3ML
3 SOLUTION RESPIRATORY (INHALATION)
Status: DISCONTINUED | OUTPATIENT
Start: 2024-12-22 | End: 2024-12-25

## 2024-12-22 RX ORDER — GUAIFENESIN AND DEXTROMETHORPHAN HYDROBROMIDE 600; 30 MG/1; MG/1
1 TABLET, EXTENDED RELEASE ORAL 2 TIMES DAILY PRN
Status: DISCONTINUED | OUTPATIENT
Start: 2024-12-22 | End: 2024-12-23

## 2024-12-22 RX ORDER — FUROSEMIDE 10 MG/ML
20 INJECTION INTRAMUSCULAR; INTRAVENOUS ONCE
Status: COMPLETED | OUTPATIENT
Start: 2024-12-22 | End: 2024-12-22

## 2024-12-22 RX ORDER — LOSARTAN POTASSIUM 100 MG/1
100 TABLET ORAL DAILY
Status: DISPENSED | OUTPATIENT
Start: 2024-12-22

## 2024-12-22 RX ORDER — ASPIRIN 81 MG/1
81 TABLET ORAL DAILY
Status: DISPENSED | OUTPATIENT
Start: 2024-12-22

## 2024-12-22 RX ADMIN — ENOXAPARIN SODIUM 40 MG: 40 INJECTION SUBCUTANEOUS at 16:33

## 2024-12-22 RX ADMIN — ESCITALOPRAM OXALATE 10 MG: 10 TABLET ORAL at 12:05

## 2024-12-22 RX ADMIN — Medication 1 LOZENGE: at 14:04

## 2024-12-22 RX ADMIN — PANTOPRAZOLE SODIUM 40 MG: 40 TABLET, DELAYED RELEASE ORAL at 12:08

## 2024-12-22 RX ADMIN — BUSPIRONE HYDROCHLORIDE 7.5 MG: 5 TABLET ORAL at 20:43

## 2024-12-22 RX ADMIN — OSELTAMIVIR PHOSPHATE 30 MG: 30 CAPSULE ORAL at 20:43

## 2024-12-22 RX ADMIN — VANCOMYCIN HYDROCHLORIDE 1250 MG: 10 INJECTION, POWDER, LYOPHILIZED, FOR SOLUTION INTRAVENOUS at 08:38

## 2024-12-22 RX ADMIN — INSULIN ASPART 1 UNITS: 100 INJECTION, SOLUTION INTRAVENOUS; SUBCUTANEOUS at 14:53

## 2024-12-22 RX ADMIN — ACETAMINOPHEN 650 MG: 325 TABLET, FILM COATED ORAL at 20:44

## 2024-12-22 RX ADMIN — LOSARTAN POTASSIUM 100 MG: 100 TABLET, FILM COATED ORAL at 12:07

## 2024-12-22 RX ADMIN — Medication 1 MG: at 20:44

## 2024-12-22 RX ADMIN — PANTOPRAZOLE SODIUM 40 MG: 40 TABLET, DELAYED RELEASE ORAL at 20:43

## 2024-12-22 RX ADMIN — Medication 1 TABLET: at 09:27

## 2024-12-22 RX ADMIN — IPRATROPIUM BROMIDE AND ALBUTEROL SULFATE 3 ML: .5; 3 SOLUTION RESPIRATORY (INHALATION) at 19:53

## 2024-12-22 RX ADMIN — CEFTRIAXONE SODIUM 2 G: 2 INJECTION, POWDER, FOR SOLUTION INTRAMUSCULAR; INTRAVENOUS at 07:52

## 2024-12-22 RX ADMIN — BUSPIRONE HYDROCHLORIDE 7.5 MG: 5 TABLET ORAL at 12:06

## 2024-12-22 RX ADMIN — OLANZAPINE 10 MG: 5 TABLET, ORALLY DISINTEGRATING ORAL at 01:10

## 2024-12-22 RX ADMIN — THIAMINE HCL TAB 100 MG 100 MG: 100 TAB at 09:27

## 2024-12-22 RX ADMIN — ATORVASTATIN CALCIUM 20 MG: 20 TABLET, FILM COATED ORAL at 12:07

## 2024-12-22 RX ADMIN — OSELTAMIVIR PHOSPHATE 30 MG: 30 CAPSULE ORAL at 09:26

## 2024-12-22 RX ADMIN — FOLIC ACID 1 MG: 1 TABLET ORAL at 09:27

## 2024-12-22 RX ADMIN — SODIUM CHLORIDE, POTASSIUM CHLORIDE, SODIUM LACTATE AND CALCIUM CHLORIDE: 600; 310; 30; 20 INJECTION, SOLUTION INTRAVENOUS at 06:20

## 2024-12-22 RX ADMIN — AZITHROMYCIN DIHYDRATE 250 MG: 250 TABLET ORAL at 09:26

## 2024-12-22 RX ADMIN — FUROSEMIDE 20 MG: 10 INJECTION, SOLUTION INTRAMUSCULAR; INTRAVENOUS at 19:09

## 2024-12-22 RX ADMIN — AMLODIPINE BESYLATE 10 MG: 10 TABLET ORAL at 20:43

## 2024-12-22 RX ADMIN — ASPIRIN 81 MG: 81 TABLET, COATED ORAL at 12:05

## 2024-12-22 ASSESSMENT — ACTIVITIES OF DAILY LIVING (ADL)
ADLS_ACUITY_SCORE: 58
ADLS_ACUITY_SCORE: 61
ADLS_ACUITY_SCORE: 57
ADLS_ACUITY_SCORE: 61
ADLS_ACUITY_SCORE: 57
ADLS_ACUITY_SCORE: 58
ADLS_ACUITY_SCORE: 54
ADLS_ACUITY_SCORE: 58
ADLS_ACUITY_SCORE: 61
ADLS_ACUITY_SCORE: 62
ADLS_ACUITY_SCORE: 58
ADLS_ACUITY_SCORE: 61
ADLS_ACUITY_SCORE: 62
ADLS_ACUITY_SCORE: 58
ADLS_ACUITY_SCORE: 54
ADLS_ACUITY_SCORE: 57
ADLS_ACUITY_SCORE: 58
ADLS_ACUITY_SCORE: 61

## 2024-12-22 NOTE — PLAN OF CARE
Goal Outcome Evaluation:      Plan of Care Reviewed With: patient    Summary: Presented with cough, SOB, headache, poor intake. Found to have influenza A and suspected super-imposed CAP. History of AUD- drinks 1 box of wine every 2 days, last drink 12/20/2024 per family.   DATE & TIME: 12/22/2024, 9388-4720    Cognitive Concerns/ Orientation : A & O x 4, anxious but cooperative.  BEHAVIOR & AGGRESSION TOOL COLOR: Green  CIWA SCORE: 0, 8,0- Received ativan PO x 1 for increased anxiety and agitation.    ABNL VS/O2: VSS on 3 L NC, O2 sats stable in low 90s, mild HTN. Afebrile.   MOBILITY: x 1 GB/W-can pivot to chair and walk to BR-weakness observed. Uses walker at baseline. PT consulted.   PAIN MANAGMENT: denies. PRN tylenol provided for mild aches. Cough drops provided for comfort.   DIET: Regular, fair appetite this evening.   BOWEL/BLADDER: BS active x 4. Reporting loose stools-which have been recently baseline per family report.   ABNL LAB/BG: Na 131, creatinine 1.04, GFR 51, procal 1.32, hgb 10.7, , 106. Influenza A positive. MRSA swab (-). Sputum culture sent this evening but contaminated, provider was paged to order new sample if still wanting. K 3.9- am check ordered.   DRAIN/DEVICES: PIV infusing LR @ 125 mL/hr.   TELEMETRY RHYTHM: NA  SKIN: pale complexion. Flushed cheeks. No wounds observed to sacrum or coccyx. Mild edema to LE bilaterally.   TESTS/PROCEDURES: Echo ordered.   D/C DAY/GOALS/PLACE: pending clinical improvement.   OTHER IMPORTANT INFO: legally blind-needs assistance with ordering meals. Granddaughter Kristi updated this evening with patient permission. Droplet precautions maintained.

## 2024-12-22 NOTE — PROGRESS NOTES
"CLINICAL NUTRITION SERVICES  -  ASSESSMENT NOTE      Recommendations Ordered by Registered Dietitian (RD):   Changed from Ensure enlive to Glucerna to avoid hyperglycemia.      Malnutrition:   Does not meet criteria for malnutrition          REASON FOR ASSESSMENT  Janet Cope is a 89 year old female seen by Registered Dietitian for Admission Nutrition Risk Screen for positive reduced oral intake and unsure weight loss.       NUTRITION HISTORY  Pertinent PMH includes diet-controlled DM2 and daily alcohol consumption. Per chart review, family reports patient drinks 1 box of wine every other day. Presume alcohol consumption possibly displacing some nutrient-dense intake. Pt busy with other providers during attempts to assess today.       CURRENT NUTRITION ORDERS  Diet Order:     Regular + Ensure enlive with meals     Current Intake/Tolerance:  OK tolerance, eating 50% of meals per nursing       NUTRITION FOCUSED PHYSICAL ASSESSMENT FOR DIAGNOSING MALNUTRITION  Observed:    Muscle atrophy appears consistent with age     ANTHROPOMETRICS  Height: 5' 4\"  Weight: 190 lbs 0 oz (86.2 kg)   Body mass index is 32.61 kg/m .  Weight Status:  Obesity Grade I BMI 30-34.9  IBW: 54.5 kg  % IBW: 158  Weight History: Weight uptrending over the past year, based on records.   Wt Readings from Last 10 Encounters:   12/21/24 86.2 kg (190 lb)   12/13/23 82.6 kg (182 lb)   12/13/23 81.6 kg (180 lb)   10/13/23 78.6 kg (173 lb 4.8 oz)   06/03/22 72.6 kg (160 lb)   11/22/21 73 kg (160 lb 14.4 oz)   08/12/21 73.5 kg (162 lb)   01/04/21 74.8 kg (165 lb)   07/31/20 76.7 kg (169 lb)   10/15/19 75.8 kg (167 lb)        LABS  Labs reviewed  Na 133, Hgb 9.3     MEDICATIONS  Medications reviewed  LR @ 100 ml/hr   Pantoprazole 40 mg, Folic acid 1 mg, Multivitamin with minerals, Thiamine 100 mg       ASSESSED NUTRITION NEEDS PER APPROVED PRACTICE GUIDELINES:    Dosing Weight 62 kg (adjusted, based on IBW of 54.5 kg and actual wt of 86.2 kg) "   Estimated Energy Needs: 1238-1435 kcals (20-25 Kcal/Kg)  Justification: obese  Estimated Protein Needs: 75-95+ grams protein (1.2-1.5 g pro/Kg)  Justification: hypercatabolism with acute illness and preservation of lean body mass  Estimated Fluid Needs: 8228-9896  mL (25-30 mL/kg)  Justification: maintenance    MALNUTRITION:  % Weight Loss:  None noted  % Intake:  Decreased intake does not meet criteria for malnutrition   Subcutaneous Fat Loss:  None observed  Muscle Loss:  None observed  Fluid Retention:  None noted    Malnutrition Diagnosis: Patient does not meet two of the above criteria necessary for diagnosing malnutrition    NUTRITION DIAGNOSIS:  Inadequate oral intake related to presumed reduced appetite as evidenced by currently eating 50% of meals, likely meeting < estimated nutrition needs.     INTERVENTIONS  Recommendations / Nutrition Prescription  See above    Goals  Pt to consume at least 75% of three nutritionally adequate meals per day (or equivalent via snacks/supplements).       MONITORING AND EVALUATION:  Progress towards goals will be monitored and evaluated per protocol and Practice Guidelines    Sofy Pang RD, LD, CNSC   Available on [employer]

## 2024-12-22 NOTE — PROGRESS NOTES
"Date & Time: 24 7834-0843  Summary: Admitted with cough, SOB, headache, poor intake. Found to have influenza A. History of AUD- drinks 1 box of wine every 2 days, last drink 2024 per family.   Orientation/Cognitive: A&Ox4,  Mobility Level/Assist Equipment: A1/GB/Walker  Fall Risk (Y/N): Yes  Behavior Concerns: Green, patient can be a bit anxious/ restless  Pain Management: Declines pain medication  Tele/VS/O2: VSS on 3L of oxygen  ABNL Lab/BG:  ,  Hgb 10.7, procal 1.32. B, 106,& 158.  Diet: Regular  Bowel/Bladder: Incontinent of bowel and bladder  Skin Concerns: Slight redness to premium area. Coccyx/buttocks blanchable redness, generalized scabs. Mild BLE edema present.  Drains/Devices: Right PIV infusing LR at 123 ml/hr  Tests/Procedures for next shift: ECHO need to be completed  Anticipated DC date & active delays: TBD  Other Important Information: Patient is legally blind, need help with ordering meals. Droplet precautions maintained      /62 (BP Location: Right arm, Patient Position: Semi-Wilkerson's, Cuff Size: Adult Regular)   Pulse 81   Temp 97.3  F (36.3  C) (Oral)   Resp 18   Ht 1.626 m (5' 4\")   Wt 86.2 kg (190 lb)   SpO2 92%   BMI 32.61 kg/m        "

## 2024-12-22 NOTE — PROVIDER NOTIFICATION
MD Notification    Notified Person: MD    Notified Person Name:STEPHON     Notification Date/Time: 12/21 2313    Notification Interaction:VOCBETTINA     Purpose of Notification: 622-EP Sputum sample that was collected came back contaminated. If sputum sample still needed, can you please place a new order? Thank you.     Orders Received:    Comments:

## 2024-12-22 NOTE — PROGRESS NOTES
[facility]    Medicine Progress Note - Hospitalist Service    Date of Admission:  12/21/2024    Assessment & Plan     Janet Cope is a 89 year old female with past medical history significant for daily alcohol use, diabetes mellitus type 2 who presents with cough, shortness of breath, headache, poor intake. Admitted on 12/21/2024.      Influenza A  Suspected super-imposed community acquired pneumonia   Acute hypoxic respiratory failure   *Presents with productive cough, shortness of breath, headache, poor appetite. Symptom onset 12/14.  *Influenza A PCR positive 12/21.   *CXR with patchy bilateral pulmonary infiltrates, greatest in mid and lower lungs, suspicious for pneumonitis   *Oxygen saturation 86% on room air.   *Afebrile. WBC and lactic acid normal. Procalcitonin 1.32.  - Oseltamivir to complete 5 day course  - Ceftriaxone IV, azithromycin PO.  - MRSA swab negative and discontinue ancomycin on 12/22/24  - Sputum culture   - Oxygen PRN, wean as tolerated   - IVF with LR.Decreased to 100ml/hour  - PT consulted   -Wbc 13.4 on 12/23/24      # Acute metabolic encephalopathy  Likely due to underlying influenza  -Delirium precautions:  Up during the day with lights on  Lights off at night, avoid interruptions during the night as much as possible  Family visits  Encourage wearing glasses  Reorientation  Avoid opioids, benzodiazepines, anticholinergics.    Continue to ensure proper nutrition, fluid and electrolyte balance. Monitor for infections, hypoxia, metabolic derangements, or other causes of delirium.   Melatonin at night  -         Diabetes mellitus, type 2, diet-controlled   HgbA1c 5.9% 9/28/24. Glucose adequately controlled on BMP.   - HgbA1c  - Medium sliding scale insulin. Consider discontinuation if HgbA1c well-controlled on diet alone and blood sugars not requiring treatment     Alcohol use disorder  *Family reports patient drinks 1 box of wine every 2 days, patient  "reports 3 glasses daily. Daily use since   2 years ago  *No interest in quitting or cutting back   - CIWA with lorazepam  - MVI, folate, thiamine  - Defer chemical dependency consult as patient not interested. Discussed if patient changes her mind about quitting, resources available.      Cardiac murmur  Patient denies previous knowledge of murmur.   -Family decline Echo            Chronic kidney disease, stage 3  Baseline creatinine 0.9-1.1. Creatinine 1.04 on admission.   - Currently around baseline  - Avoid nephrotoxins  - Monitor BMP       Hypertension  - Continue prior to admission amlodipine, losartan when dose confirmed by pharmacy      Depression  Anxiety  - Continue prior to admission escitalopram, buspirone when dose confirmed by pharmacy      GERD  - Continue prior to admission PPI        Hyperlipidemia  - Continue prior to admission atorvastatin      # Hyponatremia  Mild improving      # Family communication  Goals of care were discussed with the family family does not want any aggressive interventions, procedures.  Patient's family declined echocardiogram.  For family patient's comfort and safety are important.  Okay to treat influenza and bacterial pneumonia.        Diet: Combination Diet Regular Diet Adult  Snacks/Supplements Adult: Ensure Enlive; With Meals    DVT Prophylaxis: Enoxaparin (Lovenox) SQ  Arroyo Catheter: Not present  Lines: None     Cardiac Monitoring: None  Code Status: No CPR- Do NOT Intubate      Clinically Significant Risk Factors         # Hyponatremia: Lowest Na = 131 mmol/L in last 2 days, will monitor as appropriate  # Hypochloremia: Lowest Cl = 95 mmol/L in last 2 days, will monitor as appropriate          # Hypertension: Noted on problem list            # Obesity: Estimated body mass index is 32.61 kg/m  as calculated from the following:    Height as of this encounter: 1.626 m (5' 4\").    Weight as of this encounter: 86.2 kg (190 lb)., PRESENT ON ADMISSION        "     Social Drivers of Health    Tobacco Use: Medium Risk (12/21/2024)    Patient History     Smoking Tobacco Use: Former     Smokeless Tobacco Use: Never   Physical Activity: Not on File (8/26/2019)    [facility]    Physical Activity     Physical Activity: 0   Stress: Not on File (8/26/2019)    Received from [facility]    Stress     Stress: 0    [facility] [employer] [facility]    Social Connections          Disposition Plan     Medically Ready for Discharge: Anticipated in 2-4 Days             Aquilino Crandall MD  Hospitalist Service  [facility]  Securely message with [employer] (more info)  Text page via [employer] Paging/Directory   ______________________________________________________________________    Interval History   No acute overnight events patient did not sleep well last night was confused overnight and this morning.  She is currently on 3 L oxygen.  Denies any pain.  Family at bedside.   Physical Exam   Vital Signs: Temp: 97.2  F (36.2  C) Temp src: Oral BP: 120/78 Pulse: 78   Resp: 17 SpO2: 96 % O2 Device: Nasal cannula Oxygen Delivery: 3 LPM  Weight: 190 lbs 0 oz    Physical Exam  Cardiovascular:      Rate and Rhythm: Normal rate and regular rhythm.      Heart sounds: Murmur heard.   Pulmonary:      Effort: Pulmonary effort is normal. No respiratory distress.      Comments: Coarse breaths sounds +  Musculoskeletal:         General: Swelling present.   Neurological:      General: No focal deficit present.          Medical Decision Making     Data     I have personally reviewed the following data over the past 24 hrs:    13.4 (H)  \   9.3 (L)   / 232     133 (L) 98 20.2 /  133 (H)   3.6 20 (L) 0.89; 0.89 \       Imaging results reviewed over the past 24 hrs:   No results found for this or any previous visit (from the past 24 hours).

## 2024-12-22 NOTE — PLAN OF CARE
Goal Outcome Evaluation: reviewed with patient and daughters   DATE & TIME: 12/22/2024 7-330    Cognitive Concerns/ Orientation : alert and oriented x 4   BEHAVIOR & AGGRESSION TOOL COLOR:  green   CIWA SCORE:  4, 0    ABNL VS/O2: 92% on 3 liters, Vitals stable   MOBILITY:  up with assistance of 1-2 some weakness this am   PAIN MANAGMENT: denies   DIET:  Regular ate fair   BOWEL/BLADDER:  continent of urine and stool some dribbling   ABNL LAB/BG:  Na 133 wbc 13.4 hgb 9.3  DRAIN/DEVICES:  IV infusing   TELEMETRY RHYTHM:  na   SKIN:  scattered bruises   TESTS/PROCEDURES:  none   D/C DATE:  uncertain   Discharge Barriers:   OTHER IMPORTANT INFO:  Pt is alert and oriented, offers no complaints of pain. Remains on 02 at 3 liters. Does offer some complaints of shortness of breath with movement . Up to the BSC with assistance of 1 gait belt and walker. Pt was up in the chair for meals ate fair.  Loose cough occasionally coughs up some green sputum. Coarse lung sounds. Remains in droplet isolation.

## 2024-12-22 NOTE — PLAN OF CARE
12/22/24 1216   Appointment Info   Signing Clinician's Name / Credentials (PT) Asha Ervin DPT   Living Environment   People in Home child(shey), adult   Living Environment Comments apartment, elevator access   Self-Care   Usual Activity Tolerance moderate   Current Activity Tolerance fair   Equipment Currently Used at Home walker, rolling   Activity/Exercise/Self-Care Comment pt is mod I w/ FWW at baseline, per dtr pt mostly remains in her apartment. Dtr would like to avoid TCU and take pt home w/ family support. Pt has raised toilet, grab bar, shower chair, 4ww at home   General Information   Onset of Illness/Injury or Date of Surgery 12/21/24   Referring Physician Erich De La Fuente MD   Pertinent History of Current Problem (include personal factors and/or comorbidities that impact the POC) Janet Cope is a 89 year old female with past medical history significant for daily alcohol use, diabetes mellitus type 2 who presents with cough, shortness of breath, headache, poor intake. Admitted on 12/21/2024.   Existing Precautions/Restrictions fall   General Observations pt on 3L supp O2 during session. Does not use supp O2 at baseline; pt [place], legally blind   Cognition   Affect/Mental Status (Cognition) WNL   Cognitive Status Comments pt is [place], legally blind   Pain Assessment   Patient Currently in Pain No   Integumentary/Edema   Integumentary/Edema Comments BLE 1-2+ edema noted   Posture    Posture Forward head position   Range of Motion (ROM)   ROM Comment BLE WFL   Strength (Manual Muscle Testing)   Strength Comments Pt able to perform supine SLR, demonstrates gross functional weakness w/ transfers, difficulty w/ ambulation this PM due to weakness   Bed Mobility   Comment, (Bed Mobility) Supine > sitting EOB CGA use of bedrail   Transfers   Comment, (Transfers) STS min A heavy reliance BUE push off use of FWW for stability upon standing   Gait/Stairs (Locomotion)   Comment, (Gait/Stairs) Pt  ambulated 3' FWW, shuffling gait, unsteady   Balance   Balance Comments impaired dynamic balance w/ use of FWW. Good static sitting balance EOB   Sensory Examination   Sensory Perception Comments intact to light touch   Clinical Impression   Criteria for Skilled Therapeutic Intervention Yes, treatment indicated   PT Diagnosis (PT) Impaired IND w/ mobility from baseline   Influenced by the following impairments functional weakness, impaired balance, impaired activity tolerance   Functional limitations due to impairments impaired IND with mobility   Clinical Presentation (PT Evaluation Complexity) evolving   Clinical Presentation Rationale clinical judgement, age, co morbidities, level of assist   Clinical Decision Making (Complexity) moderate complexity   Planned Therapy Interventions (PT) balance training;bed mobility training;gait training;patient/family education;strengthening;transfer training   Risk & Benefits of therapy have been explained evaluation/treatment results reviewed;care plan/treatment goals reviewed;risks/benefits reviewed;patient   PT Total Evaluation Time   PT Eval, Moderate Complexity Minutes (27498) 10   Physical Therapy Goals   PT Frequency Daily   PT Predicted Duration/Target Date for Goal Attainment 12/29/24   PT Goals Bed Mobility;Transfers;Gait   PT: Bed Mobility Supervision/stand-by assist;Supine to/from sit   PT: Transfers Supervision/stand-by assist;Sit to/from stand;Bed to/from chair;Assistive device   PT: Gait Supervision/stand-by assist;Rolling walker;50 feet   Interventions   Interventions Quick Adds Therapeutic Activity   Therapeutic Activity   Therapeutic Activities: dynamic activities to improve functional performance Minutes (87923) 24   Symptoms Noted During/After Treatment Fatigue   Treatment Detail/Skilled Intervention Eval complete, tx indicated. Rm setup for OOB mobility. Monitored vitals during session. Pt on 3L supp O2 sats 90-92%. Pt appearing very fatigued this PM but  motivated to participate in therapy. Slow to complete mobility tasks. Dtr present start of session to confirm hx info then left. Supine > sitting cues technique, use of grab bar. CGA. Good static sitting balance. Assist to solange socks sitting EOB. STS x 2 from EOB, FWW, cues proper hand placement, min A progressing to CGA. Poor carryover of proper hand placement. SPT bed > recliner w/ use of FWW, difficulty w/ ft clearance, unsteady, weak. Poor stand > sit eccentric control. Pt engaged in STS from recliner, pulls up on walker despite cues for proper hand placement. Attempted standing marching, completed x 3 reps, sat impulsively, unable to tolerate further mobility. Pt left seated w/ BLE elevated and needs in reach alarm on   PT Discharge Planning   PT Plan Progress mobility as tolerated   PT Discharge Recommendation (DC Rec) Transitional Care Facility;home with home care physical therapy;home with assist   PT Rationale for DC Rec Pt currently well below baseline mobility. Currently A x 1 for transfers and short distance ambulation. pt demonstrating poor tolerance to OOB mobility. [facility] however per [facility]tr would like pt to return to apartment w/ family assist. Pt has elevated toilet, 4ww, shower chair, grab bars. If family takes pt home then recommend 24/7 assist w/ mobility w/ use of FWW, assist w/ ADLs/IADLs, household tasks. Recommend HHPT/OT   PT Brief overview of current status min A FWW; Goals of therapy will be to address safe mobility and make recommendations for discharge to next level of care. Patient and RN will continue to follow all falls risk precautions as documented by RN staff while hospitalized   Physical Therapy Time and Intention   Timed Code Treatment Minutes 24   Total Session Time (sum of timed and untimed services) 34

## 2024-12-23 LAB
ANION GAP SERPL CALCULATED.3IONS-SCNC: 14 MMOL/L (ref 7–15)
BUN SERPL-MCNC: 15.5 MG/DL (ref 8–23)
CALCIUM SERPL-MCNC: 9.1 MG/DL (ref 8.8–10.4)
CHLORIDE SERPL-SCNC: 100 MMOL/L (ref 98–107)
CREAT SERPL-MCNC: 0.9 MG/DL (ref 0.51–0.95)
CREAT SERPL-MCNC: 0.9 MG/DL (ref 0.51–0.95)
EGFRCR SERPLBLD CKD-EPI 2021: 60 ML/MIN/1.73M2
EGFRCR SERPLBLD CKD-EPI 2021: 60 ML/MIN/1.73M2
ERYTHROCYTE [DISTWIDTH] IN BLOOD BY AUTOMATED COUNT: 18.4 % (ref 10–15)
GLUCOSE BLDC GLUCOMTR-MCNC: 118 MG/DL (ref 70–99)
GLUCOSE BLDC GLUCOMTR-MCNC: 118 MG/DL (ref 70–99)
GLUCOSE BLDC GLUCOMTR-MCNC: 130 MG/DL (ref 70–99)
GLUCOSE BLDC GLUCOMTR-MCNC: 144 MG/DL (ref 70–99)
GLUCOSE BLDC GLUCOMTR-MCNC: 91 MG/DL (ref 70–99)
GLUCOSE SERPL-MCNC: 134 MG/DL (ref 70–99)
HCO3 SERPL-SCNC: 24 MMOL/L (ref 22–29)
HCT VFR BLD AUTO: 29.5 % (ref 35–47)
HGB BLD-MCNC: 9.2 G/DL (ref 11.7–15.7)
MCH RBC QN AUTO: 25.4 PG (ref 26.5–33)
MCHC RBC AUTO-ENTMCNC: 31.2 G/DL (ref 31.5–36.5)
MCV RBC AUTO: 82 FL (ref 78–100)
PLATELET # BLD AUTO: 286 10E3/UL (ref 150–450)
POTASSIUM SERPL-SCNC: 3.4 MMOL/L (ref 3.4–5.3)
POTASSIUM SERPL-SCNC: 3.9 MMOL/L (ref 3.4–5.3)
RBC # BLD AUTO: 3.62 10E6/UL (ref 3.8–5.2)
SODIUM SERPL-SCNC: 138 MMOL/L (ref 135–145)
WBC # BLD AUTO: 11.4 10E3/UL (ref 4–11)

## 2024-12-23 PROCEDURE — 82310 ASSAY OF CALCIUM: CPT | Performed by: STUDENT IN AN ORGANIZED HEALTH CARE EDUCATION/TRAINING PROGRAM

## 2024-12-23 PROCEDURE — 94640 AIRWAY INHALATION TREATMENT: CPT | Mod: 76

## 2024-12-23 PROCEDURE — 250N000011 HC RX IP 250 OP 636: Performed by: STUDENT IN AN ORGANIZED HEALTH CARE EDUCATION/TRAINING PROGRAM

## 2024-12-23 PROCEDURE — 250N000013 HC RX MED GY IP 250 OP 250 PS 637: Performed by: INTERNAL MEDICINE

## 2024-12-23 PROCEDURE — 94640 AIRWAY INHALATION TREATMENT: CPT

## 2024-12-23 PROCEDURE — 36415 COLL VENOUS BLD VENIPUNCTURE: CPT | Performed by: STUDENT IN AN ORGANIZED HEALTH CARE EDUCATION/TRAINING PROGRAM

## 2024-12-23 PROCEDURE — 84132 ASSAY OF SERUM POTASSIUM: CPT | Performed by: STUDENT IN AN ORGANIZED HEALTH CARE EDUCATION/TRAINING PROGRAM

## 2024-12-23 PROCEDURE — 120N000001 HC R&B MED SURG/OB

## 2024-12-23 PROCEDURE — 250N000009 HC RX 250: Performed by: STUDENT IN AN ORGANIZED HEALTH CARE EDUCATION/TRAINING PROGRAM

## 2024-12-23 PROCEDURE — 250N000011 HC RX IP 250 OP 636: Performed by: INTERNAL MEDICINE

## 2024-12-23 PROCEDURE — 250N000013 HC RX MED GY IP 250 OP 250 PS 637: Performed by: STUDENT IN AN ORGANIZED HEALTH CARE EDUCATION/TRAINING PROGRAM

## 2024-12-23 PROCEDURE — 999N000157 HC STATISTIC RCP TIME EA 10 MIN

## 2024-12-23 PROCEDURE — 85014 HEMATOCRIT: CPT | Performed by: STUDENT IN AN ORGANIZED HEALTH CARE EDUCATION/TRAINING PROGRAM

## 2024-12-23 PROCEDURE — 99232 SBSQ HOSP IP/OBS MODERATE 35: CPT | Performed by: STUDENT IN AN ORGANIZED HEALTH CARE EDUCATION/TRAINING PROGRAM

## 2024-12-23 PROCEDURE — 250N000009 HC RX 250: Performed by: INTERNAL MEDICINE

## 2024-12-23 PROCEDURE — 80048 BASIC METABOLIC PNL TOTAL CA: CPT | Performed by: STUDENT IN AN ORGANIZED HEALTH CARE EDUCATION/TRAINING PROGRAM

## 2024-12-23 RX ORDER — GUAIFENESIN/DEXTROMETHORPHAN 100-10MG/5
10 SYRUP ORAL EVERY 4 HOURS PRN
Status: DISCONTINUED | OUTPATIENT
Start: 2024-12-23 | End: 2024-12-24

## 2024-12-23 RX ORDER — FUROSEMIDE 10 MG/ML
20 INJECTION INTRAMUSCULAR; INTRAVENOUS ONCE
Status: COMPLETED | OUTPATIENT
Start: 2024-12-23 | End: 2024-12-23

## 2024-12-23 RX ORDER — PANTOPRAZOLE SODIUM 40 MG/1
40 TABLET, DELAYED RELEASE ORAL DAILY
Status: DISPENSED | OUTPATIENT
Start: 2024-12-24

## 2024-12-23 RX ORDER — POTASSIUM CHLORIDE 1.5 G/1.58G
40 POWDER, FOR SOLUTION ORAL ONCE
Status: COMPLETED | OUTPATIENT
Start: 2024-12-23 | End: 2024-12-23

## 2024-12-23 RX ORDER — ALBUTEROL SULFATE 0.83 MG/ML
2.5 SOLUTION RESPIRATORY (INHALATION) EVERY 6 HOURS PRN
Status: DISPENSED | OUTPATIENT
Start: 2024-12-23

## 2024-12-23 RX ADMIN — Medication 1 LOZENGE: at 10:36

## 2024-12-23 RX ADMIN — OSELTAMIVIR PHOSPHATE 30 MG: 30 CAPSULE ORAL at 20:18

## 2024-12-23 RX ADMIN — LOSARTAN POTASSIUM 100 MG: 100 TABLET, FILM COATED ORAL at 09:16

## 2024-12-23 RX ADMIN — OSELTAMIVIR PHOSPHATE 30 MG: 30 CAPSULE ORAL at 09:16

## 2024-12-23 RX ADMIN — ACETAMINOPHEN 650 MG: 325 TABLET, FILM COATED ORAL at 21:42

## 2024-12-23 RX ADMIN — GUAIFENESIN AND DEXTROMETHORPHAN 10 ML: 100; 10 SYRUP ORAL at 14:57

## 2024-12-23 RX ADMIN — IPRATROPIUM BROMIDE AND ALBUTEROL SULFATE 3 ML: .5; 3 SOLUTION RESPIRATORY (INHALATION) at 07:26

## 2024-12-23 RX ADMIN — IPRATROPIUM BROMIDE AND ALBUTEROL SULFATE 3 ML: .5; 3 SOLUTION RESPIRATORY (INHALATION) at 11:02

## 2024-12-23 RX ADMIN — ESCITALOPRAM OXALATE 10 MG: 10 TABLET ORAL at 09:16

## 2024-12-23 RX ADMIN — ASPIRIN 81 MG: 81 TABLET, COATED ORAL at 09:14

## 2024-12-23 RX ADMIN — Medication 1 TABLET: at 09:16

## 2024-12-23 RX ADMIN — ENOXAPARIN SODIUM 40 MG: 40 INJECTION SUBCUTANEOUS at 16:25

## 2024-12-23 RX ADMIN — AMLODIPINE BESYLATE 10 MG: 10 TABLET ORAL at 20:18

## 2024-12-23 RX ADMIN — THIAMINE HYDROCHLORIDE 100 MG: 100 INJECTION, SOLUTION INTRAMUSCULAR; INTRAVENOUS at 09:11

## 2024-12-23 RX ADMIN — CEFTRIAXONE SODIUM 2 G: 2 INJECTION, POWDER, FOR SOLUTION INTRAMUSCULAR; INTRAVENOUS at 08:05

## 2024-12-23 RX ADMIN — POTASSIUM CHLORIDE 40 MEQ: 1.5 POWDER, FOR SOLUTION ORAL at 14:57

## 2024-12-23 RX ADMIN — BUSPIRONE HYDROCHLORIDE 7.5 MG: 5 TABLET ORAL at 20:18

## 2024-12-23 RX ADMIN — FUROSEMIDE 20 MG: 10 INJECTION, SOLUTION INTRAMUSCULAR; INTRAVENOUS at 14:56

## 2024-12-23 RX ADMIN — ATORVASTATIN CALCIUM 20 MG: 20 TABLET, FILM COATED ORAL at 09:15

## 2024-12-23 RX ADMIN — IPRATROPIUM BROMIDE AND ALBUTEROL SULFATE 3 ML: .5; 3 SOLUTION RESPIRATORY (INHALATION) at 16:04

## 2024-12-23 RX ADMIN — IPRATROPIUM BROMIDE AND ALBUTEROL SULFATE 3 ML: .5; 3 SOLUTION RESPIRATORY (INHALATION) at 20:00

## 2024-12-23 RX ADMIN — BUSPIRONE HYDROCHLORIDE 7.5 MG: 5 TABLET ORAL at 09:16

## 2024-12-23 RX ADMIN — ACETAMINOPHEN 650 MG: 325 TABLET, FILM COATED ORAL at 14:08

## 2024-12-23 RX ADMIN — FOLIC ACID 1 MG: 1 TABLET ORAL at 09:14

## 2024-12-23 RX ADMIN — PANTOPRAZOLE SODIUM 40 MG: 40 TABLET, DELAYED RELEASE ORAL at 09:15

## 2024-12-23 RX ADMIN — OLANZAPINE 10 MG: 5 TABLET, ORALLY DISINTEGRATING ORAL at 20:18

## 2024-12-23 RX ADMIN — AZITHROMYCIN DIHYDRATE 250 MG: 250 TABLET ORAL at 09:16

## 2024-12-23 RX ADMIN — ALBUTEROL SULFATE 2.5 MG: 2.5 SOLUTION RESPIRATORY (INHALATION) at 04:15

## 2024-12-23 ASSESSMENT — ACTIVITIES OF DAILY LIVING (ADL)
ADLS_ACUITY_SCORE: 64
ADLS_ACUITY_SCORE: 54
ADLS_ACUITY_SCORE: 64
ADLS_ACUITY_SCORE: 59
ADLS_ACUITY_SCORE: 54
ADLS_ACUITY_SCORE: 54
ADLS_ACUITY_SCORE: 64
ADLS_ACUITY_SCORE: 63
ADLS_ACUITY_SCORE: 55
ADLS_ACUITY_SCORE: 63
ADLS_ACUITY_SCORE: 63
ADLS_ACUITY_SCORE: 59
ADLS_ACUITY_SCORE: 56
ADLS_ACUITY_SCORE: 59
ADLS_ACUITY_SCORE: 63
ADLS_ACUITY_SCORE: 63
ADLS_ACUITY_SCORE: 55
ADLS_ACUITY_SCORE: 56
ADLS_ACUITY_SCORE: 64
ADLS_ACUITY_SCORE: 54
ADLS_ACUITY_SCORE: 54
ADLS_ACUITY_SCORE: 63
ADLS_ACUITY_SCORE: 55

## 2024-12-23 NOTE — PLAN OF CARE
Goal Outcome Evaluation: reviewed with patient and granddaughter  DATE & TIME: 12/23/2024 7-330    Cognitive Concerns/ Orientation :  alert and oriented x 4   BEHAVIOR & AGGRESSION TOOL COLOR:  green   CIWA SCORE:  0 0    ABNL VS/O2:  on 02 at 3 liters temp 99.3  MOBILITY:  up with assistance of 1 and walker and gait belt   PAIN MANAGMENT:  medicated once for lower back pain   DIET:  regular ate fair   BOWEL/BLADDER:  incontinent at times of urine  no stools this shift   ABNL LAB/BG:  K 3.4 replacing hgb 9.2 WBC 11.4  130  DRAIN/DEVICES:  SL   TELEMETRY RHYTHM:    SKIN:  scattered bruises   TESTS/PROCEDURES:  none   D/C DATE:  uncertain   Discharge Barriers:    OTHER IMPORTANT INFO:  Pt is alert and oriented x 4, remains on 02 able to decrease to 3 liters. Pt ate fair for meals. Up in the chair for meals and did well. Pt has some incontinence of urine. Pt has a harsh cough that she can sometimes bring up some sputum. Lungs are coarse and some wheezes.

## 2024-12-23 NOTE — PROVIDER NOTIFICATION
MD Notification    Notified Person: MD    Notified Person Name: Raz     Notification Date/Time: 12/22/2024, 1630     Notification Interaction: [employer] messaging     Purpose of Notification:   Hi Dr. Crandall. She is sounding more wheezing and having more labored breathing than last evening. O2 is 92% on 4 L. Lungs sound wet with wheezing. Wondering if IV fluids need to be reduced? Thanks!    also did you want another sputum culture for her? the one last evening was cancelled due to contamination. just need new order if wanting. she was wondering if there was something else she could get for her cough? Thanks!    Orders Received:  Can you stop the iv fluids  Chest x-ray, nebs, IV lasix ordered   Mucinex ordered for cough   Sputum sample re-ordered    Comments:

## 2024-12-23 NOTE — PROGRESS NOTES
[facility]    Medicine Progress Note - Hospitalist Service    Date of Admission:  12/21/2024    Assessment & Plan   Janet Cope is a 89 year old female with past medical history significant for daily alcohol use, diabetes mellitus type 2 who presents with cough, shortness of breath, headache, poor intake. Admitted on 12/21/2024.      Influenza A  Suspected super-imposed community acquired pneumonia   Acute hypoxic respiratory failure   # Volume overload  *Presents with productive cough, shortness of breath, headache, poor appetite. Symptom onset 12/14.  *Influenza A PCR positive 12/21.   *CXR with patchy bilateral pulmonary infiltrates, greatest in mid and lower lungs, suspicious for pneumonitis   *Oxygen saturation 86% on room air.   *Afebrile. WBC and lactic acid normal. Procalcitonin 1.32.  - Oseltamivir to complete 5 day course  - Ceftriaxone IV, azithromycin PO.  - MRSA swab negative and discontinued ancomycin on 12/22/24  - Sputum culture  pending  - Oxygen PRN, wean as tolerated   - - PT consulted   -Wbc 13.4 on 12/22/24 down to 11.3 on 12/23/24  -Chest xray shows increased pulmonary opacities likely fluid and patient was given 1 dose of Lasix on 12/22/2024 and 1 more dose was given today   -Cough suppressants changed to liquid form      # Acute metabolic encephalopathy  Likely due to underlying influenza  -Delirium precautions:  Up during the day with lights on  Lights off at night, avoid interruptions during the night as much as possible  Family visits  Encourage wearing glasses  Reorientation  Avoid opioids, benzodiazepines, anticholinergics.    Continue to ensure proper nutrition, fluid and electrolyte balance. Monitor for infections, hypoxia, metabolic derangements, or other causes of delirium.            Diabetes mellitus, type 2, diet-controlled   HgbA1c 5.9% 9/28/24. Glucose adequately controlled on BMP.   - HgbA1c  - Medium sliding scale insulin. Consider  discontinuation if HgbA1c well-controlled on diet alone and blood sugars not requiring treatment     Alcohol use disorder  *Family reports patient drinks 1 box of wine every 2 days, patient reports 3 glasses daily. Daily use since   2 years ago  *No interest in quitting or cutting back   - CIWA with lorazepam  - MVI, thiamine  -Monitor closely for CIWA currently not withdrawing  Cardiac murmur  Patient denies previous knowledge of murmur.   -Family declined Echo            Chronic kidney disease, stage 3  Baseline creatinine 0.9-1.1. Creatinine 1.04 on admission.   - Currently around baseline  - Avoid nephrotoxins  - Monitor BMP       Hypertension  - Continue prior to admission amlodipine, losartan when dose confirmed by pharmacy      Depression  Anxiety  - Continue prior to admission escitalopram, buspirone when dose confirmed by pharmacy      GERD  - Continue prior to admission PPI        Hyperlipidemia  - Continue prior to admission atorvastatin      # Hyponatremia  Mild improving      # Family communication  Goals of care were discussed with the family on 24 family does not want any aggressive interventions, procedures.  Patient's family declined echocardiogram.  For family patient's comfort and safety are important.  Okay to treat influenza and bacterial pneumonia.  Goals of care were again discussed today with patient's granddaughter and present plan for daughter looking for hospice at the time of discharge  Decreased multivitamins and folic acid to decrease pill burden        Diet: Combination Diet Regular Diet Adult  Snacks/Supplements Adult: Glucerna; With Meals    DVT Prophylaxis: Enoxaparin (Lovenox) SQ  Arroyo Catheter: Not present  Lines: None     Cardiac Monitoring: None  Code Status: No CPR- Do NOT Intubate      Clinically Significant Risk Factors         # Hyponatremia: Lowest Na = 133 mmol/L in last 2 days, will monitor as appropriate           # Hypertension: Noted on problem list    "         # Obesity: Estimated body mass index is 32.39 kg/m  as calculated from the following:    Height as of this encounter: 1.626 m (5' 4\").    Weight as of this encounter: 85.6 kg (188 lb 11.4 oz)., PRESENT ON ADMISSION            Social Drivers of Health    Tobacco Use: Medium Risk (12/21/2024)    Patient History     Smoking Tobacco Use: Former     Smokeless Tobacco Use: Never   Physical Activity: Not on File (8/26/2019)    [facility] from [facility]    Physical Activity     Physical Activity: 0   Stress: Not on File (8/26/2019)    Received from [facility]    Stress     Stress: 0    [facility] from [facility] [employer] [facility] [employer] [facility] [employer]    Social Connections          Disposition Plan     Medically Ready for Discharge: Anticipated Tomorrow             Aquilino Crandall MD  Hospitalist Service  [facility]  Securely message with [employer] (more info)  Text page via [employer] Paging/Directory   ______________________________________________________________________    Interval History   No acute overnight events.  Patient states her shortness of breath is improved.  She feels feels tired.    Physical Exam   Vital Signs: Temp: 98.2  F (36.8  C) Temp src: Oral BP: 127/66 Pulse: 87   Resp: 22 SpO2: 94 % O2 Device: Nasal cannula with humidification Oxygen Delivery: 3 LPM  Weight: 188 lbs 11.42 oz    Physical Exam  Cardiovascular:      Rate and Rhythm: Normal rate and regular rhythm.      Heart sounds: Normal heart sounds.   Pulmonary:      Effort: No respiratory distress.      Breath sounds: Rales present. No wheezing.   Abdominal:      General: There is no distension.      Palpations: Abdomen is soft.      Tenderness: There is no abdominal tenderness.   Neurological:      Mental Status: She is alert.          Medical Decision Making       40 MINUTES SPENT BY ME on the date of service doing chart review, history, exam, documentation & " further activities per the note.      Data     I have personally reviewed the following data over the past 24 hrs:    11.4 (H)  \   9.2 (L)   / 286     138 100 15.5 /  118 (H)   3.4 24 0.90 \     Trop: N/A BNP: N/A       Imaging results reviewed over the past 24 hrs:   Recent Results (from the past 24 hours)   XR Chest Port 1 View    Narrative    EXAM: XR CHEST PORT 1 VIEW  LOCATION: [facility]  DATE: 12/22/2024    INDICATION: Shortness of breath.  COMPARISON: Chest x-ray 12/21/2024.      Impression    IMPRESSION: Bilateral increased vascular opacities. Patchy bilateral pulmonary opacities also noted. This could represent pulmonary edema. Other airspace disease not excluded including pneumonia versus neoplastic causes. Normal cardiac silhouette.

## 2024-12-23 NOTE — PLAN OF CARE
Goal Outcome Evaluation:      Plan of Care Reviewed With: patient    Summary: Presented with cough, SOB, headache, poor intake. Found to have influenza A and suspected super-imposed CAP. History of AUD- drinks 1 box of wine every 2 days, last drink 12/20/2024 per family.   DATE & TIME: 12/22/2024, 4449-3455    Cognitive Concerns/ Orientation : A & O x 4, calm and cooperative. Mild anxiety  BEHAVIOR & AGGRESSION TOOL COLOR: Green  CIWA SCORE: 0.    ABNL VS/O2: VSS on 4 L NC, O2 sats maintained above goal of 92%, mild HTN. Afebrile.   MOBILITY: x 1 GB/W-can pivot to chair and walk to BR-weakness observed. Uses walker at baseline. PT consulted.   PAIN MANAGMENT: denies. PRN tylenol available for mild aches. Cough drops and mucinex available for cough.   DIET: Regular, fair appetite this evening.   BOWEL/BLADDER: BS active x 4. Reporting loose stools-which have been recently baseline per family report.   ABNL LAB/BG: Na 133, WBC 13.4, hgb 9.3, . Influenza A positive. MRSA swab (-). Sputum culture sent last evening but contaminated, provider was paged for new order. K 3.6- am check ordered.   DRAIN/DEVICES: PIV/SL. IV fluids discontinued due to signs of fluid overload.   TELEMETRY RHYTHM: NA  SKIN: pale complexion. Flushed cheeks. No wounds observed to sacrum or coccyx. Mild edema to LE bilaterally.   TESTS/PROCEDURES: chest x-ray completed this evening.   D/C DAY/GOALS/PLACE: pending clinical improvement.   OTHER IMPORTANT INFO: LS diminished with crackles and expiratory wheezing-improved since IV fluids stopped. Nebs ordered and IV lasix. Per hospitalist call RRT if patient status worsening requiring >5-6 L for extended time and not able to maintain O2 sats above 92%. legally blind-needs assistance with ordering meals. Droplet precautions maintained. Daughter at bedside and updated on plan of care.

## 2024-12-23 NOTE — PLAN OF CARE
Goal Outcome Evaluation:                  DATE & TIME: 12/22/2024, 6077-3207   Cognitive Concerns/ Orientation : A/O x 4,  cooperative, anxious at times   BEHAVIOR & AGGRESSION TOOL COLOR: Green  CIWA SCORE: 0/0   ABNL VS/O2: Max T-99.4, other VSS on 4 L NC, O2 sats maintained above goal of 92%   MOBILITY: UW2/GB/W to BSC/chair, not OOB this shift  PAIN MANAGMENT: denies, Tylenol given at hs for fever/discomfort  DIET: Regular, tolerating  BOWEL/BLADDER: Purewick in place. Reporting loose stools-which have been recently baseline per family report, large BM x3 this shift  ABNL LAB/BG: Na 133, WBC 13.4, hgb 9.3, . Influenza A positive. MRSA swab (-). Sputum culture sent last evening but contaminated, provider was paged for new order, K 3.6- am check ordered. Bg 131/188  DRAIN/DEVICES: PIV/SL. IV SL   TELEMETRY RHYTHM: NA  SKIN: dry/pale/bruised, flushed cheeks. Trace edema to BLE TESTS/PROCEDURES: chest x-ray completed 12/22   OTHER IMPORTANT INFO: LS coarse with exp wheezing, Nebs scheduled and prn, PRN given x1 for dyspnea/wheezing. Continues on Tamiflu/Rocephin. Per

## 2024-12-24 ENCOUNTER — APPOINTMENT (OUTPATIENT)
Dept: PHYSICAL THERAPY | Facility: CLINIC | Age: 89
End: 2024-12-24
Payer: MEDICARE

## 2024-12-24 LAB
ANION GAP SERPL CALCULATED.3IONS-SCNC: 13 MMOL/L (ref 7–15)
BUN SERPL-MCNC: 11.2 MG/DL (ref 8–23)
CALCIUM SERPL-MCNC: 9.1 MG/DL (ref 8.8–10.4)
CHLORIDE SERPL-SCNC: 104 MMOL/L (ref 98–107)
CREAT SERPL-MCNC: 0.77 MG/DL (ref 0.51–0.95)
CREAT SERPL-MCNC: 0.77 MG/DL (ref 0.51–0.95)
EGFRCR SERPLBLD CKD-EPI 2021: 73 ML/MIN/1.73M2
EGFRCR SERPLBLD CKD-EPI 2021: 73 ML/MIN/1.73M2
ERYTHROCYTE [DISTWIDTH] IN BLOOD BY AUTOMATED COUNT: 18.3 % (ref 10–15)
GLUCOSE BLDC GLUCOMTR-MCNC: 108 MG/DL (ref 70–99)
GLUCOSE BLDC GLUCOMTR-MCNC: 113 MG/DL (ref 70–99)
GLUCOSE BLDC GLUCOMTR-MCNC: 119 MG/DL (ref 70–99)
GLUCOSE BLDC GLUCOMTR-MCNC: 126 MG/DL (ref 70–99)
GLUCOSE BLDC GLUCOMTR-MCNC: 130 MG/DL (ref 70–99)
GLUCOSE SERPL-MCNC: 123 MG/DL (ref 70–99)
HCO3 SERPL-SCNC: 24 MMOL/L (ref 22–29)
HCT VFR BLD AUTO: 27.4 % (ref 35–47)
HGB BLD-MCNC: 9 G/DL (ref 11.7–15.7)
MCH RBC QN AUTO: 26.1 PG (ref 26.5–33)
MCHC RBC AUTO-ENTMCNC: 32.8 G/DL (ref 31.5–36.5)
MCV RBC AUTO: 79 FL (ref 78–100)
PLATELET # BLD AUTO: 324 10E3/UL (ref 150–450)
POTASSIUM SERPL-SCNC: 3.5 MMOL/L (ref 3.4–5.3)
RBC # BLD AUTO: 3.45 10E6/UL (ref 3.8–5.2)
SODIUM SERPL-SCNC: 141 MMOL/L (ref 135–145)
WBC # BLD AUTO: 9.6 10E3/UL (ref 4–11)

## 2024-12-24 PROCEDURE — 80048 BASIC METABOLIC PNL TOTAL CA: CPT | Performed by: STUDENT IN AN ORGANIZED HEALTH CARE EDUCATION/TRAINING PROGRAM

## 2024-12-24 PROCEDURE — 97116 GAIT TRAINING THERAPY: CPT | Mod: GP

## 2024-12-24 PROCEDURE — 999N000157 HC STATISTIC RCP TIME EA 10 MIN

## 2024-12-24 PROCEDURE — 120N000001 HC R&B MED SURG/OB

## 2024-12-24 PROCEDURE — 85027 COMPLETE CBC AUTOMATED: CPT | Performed by: STUDENT IN AN ORGANIZED HEALTH CARE EDUCATION/TRAINING PROGRAM

## 2024-12-24 PROCEDURE — 94640 AIRWAY INHALATION TREATMENT: CPT

## 2024-12-24 PROCEDURE — 250N000011 HC RX IP 250 OP 636: Performed by: STUDENT IN AN ORGANIZED HEALTH CARE EDUCATION/TRAINING PROGRAM

## 2024-12-24 PROCEDURE — 99232 SBSQ HOSP IP/OBS MODERATE 35: CPT | Performed by: STUDENT IN AN ORGANIZED HEALTH CARE EDUCATION/TRAINING PROGRAM

## 2024-12-24 PROCEDURE — 250N000013 HC RX MED GY IP 250 OP 250 PS 637: Performed by: STUDENT IN AN ORGANIZED HEALTH CARE EDUCATION/TRAINING PROGRAM

## 2024-12-24 PROCEDURE — 84132 ASSAY OF SERUM POTASSIUM: CPT | Performed by: STUDENT IN AN ORGANIZED HEALTH CARE EDUCATION/TRAINING PROGRAM

## 2024-12-24 PROCEDURE — 94640 AIRWAY INHALATION TREATMENT: CPT | Mod: 76

## 2024-12-24 PROCEDURE — 250N000009 HC RX 250: Performed by: STUDENT IN AN ORGANIZED HEALTH CARE EDUCATION/TRAINING PROGRAM

## 2024-12-24 PROCEDURE — 250N000013 HC RX MED GY IP 250 OP 250 PS 637: Performed by: INTERNAL MEDICINE

## 2024-12-24 PROCEDURE — 97530 THERAPEUTIC ACTIVITIES: CPT | Mod: GP

## 2024-12-24 PROCEDURE — 36415 COLL VENOUS BLD VENIPUNCTURE: CPT | Performed by: STUDENT IN AN ORGANIZED HEALTH CARE EDUCATION/TRAINING PROGRAM

## 2024-12-24 PROCEDURE — 250N000011 HC RX IP 250 OP 636: Performed by: INTERNAL MEDICINE

## 2024-12-24 PROCEDURE — 82565 ASSAY OF CREATININE: CPT | Performed by: EMERGENCY MEDICINE

## 2024-12-24 RX ORDER — GUAIFENESIN/DEXTROMETHORPHAN 100-10MG/5
10 SYRUP ORAL EVERY 4 HOURS
Status: DISPENSED | OUTPATIENT
Start: 2024-12-24

## 2024-12-24 RX ADMIN — ENOXAPARIN SODIUM 40 MG: 40 INJECTION SUBCUTANEOUS at 17:33

## 2024-12-24 RX ADMIN — ACETAMINOPHEN 650 MG: 325 TABLET, FILM COATED ORAL at 21:53

## 2024-12-24 RX ADMIN — AMLODIPINE BESYLATE 10 MG: 10 TABLET ORAL at 20:24

## 2024-12-24 RX ADMIN — AZITHROMYCIN DIHYDRATE 250 MG: 250 TABLET ORAL at 09:42

## 2024-12-24 RX ADMIN — IPRATROPIUM BROMIDE AND ALBUTEROL SULFATE 3 ML: .5; 3 SOLUTION RESPIRATORY (INHALATION) at 19:56

## 2024-12-24 RX ADMIN — GUAIFENESIN AND DEXTROMETHORPHAN 10 ML: 100; 10 SYRUP ORAL at 05:54

## 2024-12-24 RX ADMIN — IPRATROPIUM BROMIDE AND ALBUTEROL SULFATE 3 ML: .5; 3 SOLUTION RESPIRATORY (INHALATION) at 11:52

## 2024-12-24 RX ADMIN — PANTOPRAZOLE SODIUM 40 MG: 40 TABLET, DELAYED RELEASE ORAL at 09:42

## 2024-12-24 RX ADMIN — ATORVASTATIN CALCIUM 20 MG: 20 TABLET, FILM COATED ORAL at 09:42

## 2024-12-24 RX ADMIN — THIAMINE HYDROCHLORIDE 100 MG: 100 INJECTION, SOLUTION INTRAMUSCULAR; INTRAVENOUS at 09:37

## 2024-12-24 RX ADMIN — IPRATROPIUM BROMIDE AND ALBUTEROL SULFATE 3 ML: .5; 3 SOLUTION RESPIRATORY (INHALATION) at 08:14

## 2024-12-24 RX ADMIN — IPRATROPIUM BROMIDE AND ALBUTEROL SULFATE 3 ML: .5; 3 SOLUTION RESPIRATORY (INHALATION) at 15:28

## 2024-12-24 RX ADMIN — ESCITALOPRAM OXALATE 10 MG: 10 TABLET ORAL at 09:42

## 2024-12-24 RX ADMIN — OSELTAMIVIR PHOSPHATE 30 MG: 30 CAPSULE ORAL at 20:24

## 2024-12-24 RX ADMIN — ACETAMINOPHEN 650 MG: 325 TABLET, FILM COATED ORAL at 13:01

## 2024-12-24 RX ADMIN — OSELTAMIVIR PHOSPHATE 30 MG: 30 CAPSULE ORAL at 09:42

## 2024-12-24 RX ADMIN — BUSPIRONE HYDROCHLORIDE 7.5 MG: 5 TABLET ORAL at 09:41

## 2024-12-24 RX ADMIN — ACETAMINOPHEN 650 MG: 325 TABLET, FILM COATED ORAL at 05:54

## 2024-12-24 RX ADMIN — LOSARTAN POTASSIUM 100 MG: 100 TABLET, FILM COATED ORAL at 09:42

## 2024-12-24 RX ADMIN — BUSPIRONE HYDROCHLORIDE 7.5 MG: 5 TABLET ORAL at 20:23

## 2024-12-24 RX ADMIN — CEFTRIAXONE SODIUM 2 G: 2 INJECTION, POWDER, FOR SOLUTION INTRAMUSCULAR; INTRAVENOUS at 09:36

## 2024-12-24 RX ADMIN — ASPIRIN 81 MG: 81 TABLET, COATED ORAL at 09:42

## 2024-12-24 RX ADMIN — GUAIFENESIN AND DEXTROMETHORPHAN 10 ML: 100; 10 SYRUP ORAL at 20:23

## 2024-12-24 ASSESSMENT — ACTIVITIES OF DAILY LIVING (ADL)
ADLS_ACUITY_SCORE: 59
ADLS_ACUITY_SCORE: 63
ADLS_ACUITY_SCORE: 59
ADLS_ACUITY_SCORE: 63
ADLS_ACUITY_SCORE: 59
ADLS_ACUITY_SCORE: 63
ADLS_ACUITY_SCORE: 59
ADLS_ACUITY_SCORE: 63
ADLS_ACUITY_SCORE: 59

## 2024-12-24 NOTE — PLAN OF CARE
DATE & TIME: 12/24/2024 AM shift              Cognitive Concerns/ Orientation :  alert and oriented x 4; forgetful. Macular degeneration per pt, poor vision.   BEHAVIOR & AGGRESSION TOOL COLOR:  green   CIWA SCORE:  0; 2.    ABNL VS/O2: VSS on 2L of oxygen, sating 92%-93%. LS coarse. Infrequent congested non productive cough. Unable to collect sputum. RT is following with nebs. SOB/JULES noted. Denies chest pain.   MOBILITY:  up with assistance of 1 and walker and gait belt. Up to the chair for meals. Weak.   PAIN MANAGMENT:  tylenol x 1 given for lower back pain, sleeping after.   DIET:  regular  diet; daughter brought lunch from outside.   BOWEL/BLADDER:  Purewick in place when in bed; up to bedside commode. Can be incontinent at times. No BM this shift.   ABNL LAB/BG:  /108. Hg 9.0. BC neg  DRAIN/DEVICES: PIV SL; interm IV abx  TELEMETRY RHYTHM:  n/a  SKIN:  scattered bruises. Dry flaky skin. Pale. +2/+1 edema to izabella LE  TESTS/PROCEDURES:  none   D/C DATE:  Pending; family does not want any aggressive treatments or procedures. Ok to treat pneumonia and flu, pt is on Tamiflu. Daughter is looking into hospice for discharge.   Discharge Barriers: oxygen needs/SOB/JULES  OTHER IMPORTANT INFO:  n/a

## 2024-12-24 NOTE — PLAN OF CARE
DATE & TIME: 12/23/2024, 2300 - 12/24/2024 3175  Cognitive Concerns/ Orientation : A&Ox4; calm and cooperative this shift, can be anxious at times (per report)  BEHAVIOR & AGGRESSION TOOL COLOR: Green  CIWA SCORE: 0 this shift  ABNL VS/O2: VSS on 2L L NC, O2 sats maintained above goal of 92%   MOBILITY: Assist x 1 with GB and walker; JULES. Up to BSC this shift   PAIN MANAGMENT: complained of low back pain this AM; PRN Tylenol given  DIET: Regular diet ordered; tolerating without problems per patient report  BOWEL/BLADDER: Purewick in place overnight, good output.  No BM this shift.   ABNL LAB/BG: . K: 3.9.  DRAIN/DEVICES: PIV SL  TELEMETRY RHYTHM: N/A  SKIN: dry/pale/bruised, flushed cheeks. Trace edema to BLE   TESTS/PROCEDURES: N/A  OTHER IMPORTANT INFO:  - LS coarse/crackles   - Needs assistance with ordering meals, legally blind  - Droplet precautions maintained  - PRN Robitussin given per patient request

## 2024-12-24 NOTE — PLAN OF CARE
Goal Outcome Evaluation:  Summary: Presented with cough, SOB, headache, poor intake. Found to have influenza A and suspected super-imposed CAP. History of AUD- drinks 1 box of wine every 2 days, last drink 12/20/2024 per family.   DATE & TIME: 12/23/2024, [id number]   Cognitive Concerns/ Orientation : A/O x 4,  cooperative, anxious at times   BEHAVIOR & AGGRESSION TOOL COLOR: Green  CIWA SCORE: 0, 4 for anxiety.    ABNL VS/O2: VVS@2L L NC, O2 sats maintained above goal of 92%   MOBILITY: Assit 1 GBW, JULES. Bed to chair multiple times.   PAIN MANAGMENT: Tylenol given for headache.  DIET: Regular, tolerating  BOWEL/BLADDER: Purewick in place. No Bm this shift. Reporting loose stools-which have been recently baseline per family report, No BM this shift.   ABNL LAB/BG: , 144. K recheck 3.9.  DRAIN/DEVICES: PIV/SL. IV SL   TELEMETRY RHYTHM: NA  SKIN: dry/pale/bruised, flushed cheeks. Trace edema to BLE   TESTS/PROCEDURES: NA  OTHER IMPORTANT INFO: LS coarse with exp wheezing, Nebs scheduled for dyspnea/wheezing. Per hospitalist call RRT if patient status worsening requiring >5-6 L for extended time and not able to maintain O2 sats above 92%.   legally blind-needs assistance with ordering meals. Droplet precautions maintained.

## 2024-12-25 LAB
ANION GAP SERPL CALCULATED.3IONS-SCNC: 14 MMOL/L (ref 7–15)
BUN SERPL-MCNC: 11.7 MG/DL (ref 8–23)
CALCIUM SERPL-MCNC: 9.1 MG/DL (ref 8.8–10.4)
CHLORIDE SERPL-SCNC: 103 MMOL/L (ref 98–107)
CREAT SERPL-MCNC: 0.97 MG/DL (ref 0.51–0.95)
EGFRCR SERPLBLD CKD-EPI 2021: 55 ML/MIN/1.73M2
ERYTHROCYTE [DISTWIDTH] IN BLOOD BY AUTOMATED COUNT: 18.3 % (ref 10–15)
GLUCOSE BLDC GLUCOMTR-MCNC: 105 MG/DL (ref 70–99)
GLUCOSE BLDC GLUCOMTR-MCNC: 111 MG/DL (ref 70–99)
GLUCOSE BLDC GLUCOMTR-MCNC: 116 MG/DL (ref 70–99)
GLUCOSE BLDC GLUCOMTR-MCNC: 117 MG/DL (ref 70–99)
GLUCOSE BLDC GLUCOMTR-MCNC: 94 MG/DL (ref 70–99)
GLUCOSE SERPL-MCNC: 97 MG/DL (ref 70–99)
HCO3 SERPL-SCNC: 24 MMOL/L (ref 22–29)
HCT VFR BLD AUTO: 29.1 % (ref 35–47)
HGB BLD-MCNC: 9.2 G/DL (ref 11.7–15.7)
MCH RBC QN AUTO: 25.4 PG (ref 26.5–33)
MCHC RBC AUTO-ENTMCNC: 31.6 G/DL (ref 31.5–36.5)
MCV RBC AUTO: 80 FL (ref 78–100)
PLATELET # BLD AUTO: 352 10E3/UL (ref 150–450)
POTASSIUM SERPL-SCNC: 3.6 MMOL/L (ref 3.4–5.3)
RBC # BLD AUTO: 3.62 10E6/UL (ref 3.8–5.2)
SODIUM SERPL-SCNC: 141 MMOL/L (ref 135–145)
WBC # BLD AUTO: 8.8 10E3/UL (ref 4–11)

## 2024-12-25 PROCEDURE — 250N000013 HC RX MED GY IP 250 OP 250 PS 637: Performed by: STUDENT IN AN ORGANIZED HEALTH CARE EDUCATION/TRAINING PROGRAM

## 2024-12-25 PROCEDURE — 250N000009 HC RX 250: Performed by: STUDENT IN AN ORGANIZED HEALTH CARE EDUCATION/TRAINING PROGRAM

## 2024-12-25 PROCEDURE — 250N000011 HC RX IP 250 OP 636: Performed by: INTERNAL MEDICINE

## 2024-12-25 PROCEDURE — 36415 COLL VENOUS BLD VENIPUNCTURE: CPT | Performed by: STUDENT IN AN ORGANIZED HEALTH CARE EDUCATION/TRAINING PROGRAM

## 2024-12-25 PROCEDURE — 85014 HEMATOCRIT: CPT | Performed by: STUDENT IN AN ORGANIZED HEALTH CARE EDUCATION/TRAINING PROGRAM

## 2024-12-25 PROCEDURE — 94640 AIRWAY INHALATION TREATMENT: CPT | Mod: 76

## 2024-12-25 PROCEDURE — 80048 BASIC METABOLIC PNL TOTAL CA: CPT | Performed by: STUDENT IN AN ORGANIZED HEALTH CARE EDUCATION/TRAINING PROGRAM

## 2024-12-25 PROCEDURE — 94640 AIRWAY INHALATION TREATMENT: CPT

## 2024-12-25 PROCEDURE — 250N000011 HC RX IP 250 OP 636: Performed by: STUDENT IN AN ORGANIZED HEALTH CARE EDUCATION/TRAINING PROGRAM

## 2024-12-25 PROCEDURE — 999N000157 HC STATISTIC RCP TIME EA 10 MIN

## 2024-12-25 PROCEDURE — 99232 SBSQ HOSP IP/OBS MODERATE 35: CPT | Performed by: STUDENT IN AN ORGANIZED HEALTH CARE EDUCATION/TRAINING PROGRAM

## 2024-12-25 PROCEDURE — 120N000001 HC R&B MED SURG/OB

## 2024-12-25 PROCEDURE — 250N000013 HC RX MED GY IP 250 OP 250 PS 637: Performed by: INTERNAL MEDICINE

## 2024-12-25 RX ORDER — VALACYCLOVIR HYDROCHLORIDE 1 G/1
1000 TABLET, FILM COATED ORAL ONCE
Status: COMPLETED | OUTPATIENT
Start: 2024-12-25 | End: 2024-12-25

## 2024-12-25 RX ADMIN — ACETAMINOPHEN 650 MG: 325 TABLET, FILM COATED ORAL at 18:49

## 2024-12-25 RX ADMIN — GUAIFENESIN AND DEXTROMETHORPHAN 10 ML: 100; 10 SYRUP ORAL at 17:03

## 2024-12-25 RX ADMIN — VALACYCLOVIR HYDROCHLORIDE 1000 MG: 1 TABLET, FILM COATED ORAL at 17:03

## 2024-12-25 RX ADMIN — ENOXAPARIN SODIUM 40 MG: 40 INJECTION SUBCUTANEOUS at 17:03

## 2024-12-25 RX ADMIN — ACETAMINOPHEN 650 MG: 325 TABLET, FILM COATED ORAL at 09:30

## 2024-12-25 RX ADMIN — PANTOPRAZOLE SODIUM 40 MG: 40 TABLET, DELAYED RELEASE ORAL at 09:17

## 2024-12-25 RX ADMIN — GUAIFENESIN AND DEXTROMETHORPHAN 10 ML: 100; 10 SYRUP ORAL at 06:26

## 2024-12-25 RX ADMIN — BUSPIRONE HYDROCHLORIDE 7.5 MG: 5 TABLET ORAL at 20:24

## 2024-12-25 RX ADMIN — GUAIFENESIN AND DEXTROMETHORPHAN 10 ML: 100; 10 SYRUP ORAL at 00:10

## 2024-12-25 RX ADMIN — IPRATROPIUM BROMIDE AND ALBUTEROL SULFATE 3 ML: .5; 3 SOLUTION RESPIRATORY (INHALATION) at 11:17

## 2024-12-25 RX ADMIN — OSELTAMIVIR PHOSPHATE 30 MG: 30 CAPSULE ORAL at 20:25

## 2024-12-25 RX ADMIN — ATORVASTATIN CALCIUM 20 MG: 20 TABLET, FILM COATED ORAL at 09:17

## 2024-12-25 RX ADMIN — ASPIRIN 81 MG: 81 TABLET, COATED ORAL at 09:18

## 2024-12-25 RX ADMIN — CEFTRIAXONE SODIUM 2 G: 2 INJECTION, POWDER, FOR SOLUTION INTRAMUSCULAR; INTRAVENOUS at 09:18

## 2024-12-25 RX ADMIN — OSELTAMIVIR PHOSPHATE 30 MG: 30 CAPSULE ORAL at 09:18

## 2024-12-25 RX ADMIN — GUAIFENESIN AND DEXTROMETHORPHAN 10 ML: 100; 10 SYRUP ORAL at 12:24

## 2024-12-25 RX ADMIN — THIAMINE HYDROCHLORIDE 100 MG: 100 INJECTION, SOLUTION INTRAMUSCULAR; INTRAVENOUS at 09:18

## 2024-12-25 RX ADMIN — ESCITALOPRAM OXALATE 10 MG: 10 TABLET ORAL at 09:18

## 2024-12-25 RX ADMIN — LOSARTAN POTASSIUM 100 MG: 100 TABLET, FILM COATED ORAL at 09:18

## 2024-12-25 RX ADMIN — AZITHROMYCIN DIHYDRATE 250 MG: 250 TABLET ORAL at 09:18

## 2024-12-25 RX ADMIN — AMLODIPINE BESYLATE 10 MG: 10 TABLET ORAL at 20:32

## 2024-12-25 RX ADMIN — IPRATROPIUM BROMIDE AND ALBUTEROL SULFATE 3 ML: .5; 3 SOLUTION RESPIRATORY (INHALATION) at 07:40

## 2024-12-25 RX ADMIN — BUSPIRONE HYDROCHLORIDE 7.5 MG: 5 TABLET ORAL at 09:17

## 2024-12-25 RX ADMIN — GUAIFENESIN AND DEXTROMETHORPHAN 10 ML: 100; 10 SYRUP ORAL at 22:58

## 2024-12-25 ASSESSMENT — ACTIVITIES OF DAILY LIVING (ADL)
ADLS_ACUITY_SCORE: 72
ADLS_ACUITY_SCORE: 63
ADLS_ACUITY_SCORE: 63
ADLS_ACUITY_SCORE: 72
ADLS_ACUITY_SCORE: 72
ADLS_ACUITY_SCORE: 63
ADLS_ACUITY_SCORE: 67
ADLS_ACUITY_SCORE: 72
ADLS_ACUITY_SCORE: 63
ADLS_ACUITY_SCORE: 71
ADLS_ACUITY_SCORE: 72
ADLS_ACUITY_SCORE: 71
ADLS_ACUITY_SCORE: 67
ADLS_ACUITY_SCORE: 63
ADLS_ACUITY_SCORE: 71
ADLS_ACUITY_SCORE: 63

## 2024-12-25 NOTE — PROGRESS NOTES
[facility]    Medicine Progress Note - Hospitalist Service    Date of Admission:  12/21/2024    Assessment & Plan   Janet Cope is a 89 year old female with past medical history significant for daily alcohol use, diabetes mellitus type 2 who presents with cough, shortness of breath, headache, poor intake. Admitted on 12/21/2024.      Influenza A  Suspected super-imposed community acquired pneumonia   Acute hypoxic respiratory failure   # Volume overload  *Presents with productive cough, shortness of breath, headache, poor appetite. Symptom onset 12/14.  *Influenza A PCR positive 12/21.   *CXR with patchy bilateral pulmonary infiltrates, greatest in mid and lower lungs, suspicious for pneumonitis   *Oxygen saturation 86% on room air.   *Afebrile. WBC and lactic acid normal. Procalcitonin 1.32.  - Oseltamivir to complete 5 day course  - Ceftriaxone IV, azithromycin PO.  - MRSA swab negative and discontinued ancomycin on 12/22/24  - Sputum culture  pending  - Oxygen PRN, wean as tolerated   - - PT consulted   -Wbc 13.4 on 12/22/24 down to 11.3 on 12/23/24  -Chest xray shows increased pulmonary opacities likely fluid and patient was given 1 dose of Lasix on 12/22/2024 and lasix second dose on 12/23/24  -Cough suppressants changed to liquid form and scheduled from prn on 12/23/24  -Down to 1-2liters oxygen      # Acute metabolic encephalopathy Resolved  Likely due to underlying influenza  -Delirium precautions:  Up during the day with lights on  Lights off at night, avoid interruptions during the night as much as possible  Family visits  Encourage wearing glasses  Reorientation  Avoid opioids, benzodiazepines, anticholinergics.    Continue to ensure proper nutrition, fluid and electrolyte balance. Monitor for infections, hypoxia, metabolic derangements, or other causes of delirium.            Diabetes mellitus, type 2, diet-controlled   HgbA1c 5.9% 9/28/24. Glucose adequately controlled  on BMP.   - HgbA1c  - Medium sliding scale insulin. Consider discontinuation if HgbA1c well-controlled on diet alone and blood sugars not requiring treatment     Alcohol use disorder  *Family reports patient drinks 1 box of wine every 2 days, patient reports 3 glasses daily. Daily use since   2 years ago  *No interest in quitting or cutting back   - CIWA with lorazepam  - MVI, thiamine  -Monitor closely for CIWA currently not withdrawing  Cardiac murmur  Patient denies previous knowledge of murmur.   -Family declined Echo            Chronic kidney disease, stage 3  Baseline creatinine 0.9-1.1. Creatinine 1.04 on admission.   - Currently around baseline  - Avoid nephrotoxins  - Monitor BMP       Hypertension  - Continue prior to admission amlodipine, losartan when dose confirmed by pharmacy      Depression  Anxiety  - Continue prior to admission escitalopram, buspirone when dose confirmed by pharmacy      GERD  - Continue prior to admission PPI        Hyperlipidemia  - Continue prior to admission atorvastatin      # Hyponatremia  Mild improving    # Ear Blister  Likley due to scratching  Not painful  Will continue to monitor  Addendum unclear etiology.  Not painful.  Will monitor closely.  Also discussed with ID and will empirically start valacyclovir and will monitor closely.  This plan was discussed with patient's POA granddaughter Kristi  patient granddaughter agreed to the plan     # Constipation  Bowel regimen       # Family communication  Gen.    Patient's daughter at bedside on 2024          Diet: Combination Diet Regular Diet Adult  Snacks/Supplements Adult: Glucerna; With Meals    DVT Prophylaxis: Enoxaparin (Lovenox) SQ  Arroyo Catheter: Not present  Lines: None     Cardiac Monitoring: None  Code Status: No CPR- Do NOT Intubate      Clinically Significant Risk Factors                   # Hypertension: Noted on problem list            # Obesity: Estimated body mass index is 32.39 kg/m  as  "calculated from the following:    Height as of this encounter: 1.626 m (5' 4\").    Weight as of this encounter: 85.6 kg (188 lb 11.4 oz).             Social Drivers of Health    Tobacco Use: Medium Risk (12/21/2024)    Patient History     Smoking Tobacco Use: Former     Smokeless Tobacco Use: Never   Physical Activity: Not on File (8/26/2019)    Received from [facility]    Physical Activity     Physical Activity: 0   Stress: Not on File (8/26/2019)    Received from [facility]    Stress     Stress: 0    [facility] [employer] [facility] [employer] [facility]    Social Connections          Disposition Plan     Medically Ready for Discharge: Anticipated Tomorrow             Aquilino Crandall MD  Hospitalist Service  [facility]  Securely message with [employer] (more info)  Text page via [employer] Paging/Directory   ______________________________________________________________________    Interval History   No acute events daughter present at bedside patient feels well.  She feels her energy is improved.  She is down to 1 to 2 L of oxygen.  Has been constipated.  Cough is well-controlled.  Sleeping well.   Physical Exam   Vital Signs: Temp: 98.2  F (36.8  C) Temp src: Oral BP: (!) 146/69 Pulse: 89   Resp: 18 SpO2: 96 % O2 Device: Nasal cannula Oxygen Delivery: 1 LPM  Weight: 188 lbs 11.42 oz    Physical Exam  Cardiovascular:      Rate and Rhythm: Normal rate and regular rhythm.   Pulmonary:      Effort: No respiratory distress.      Breath sounds: Normal breath sounds. No wheezing.      Comments: Coarse breath sounds  Abdominal:      General: There is no distension.      Palpations: Abdomen is soft.      Tenderness: There is no abdominal tenderness.   Neurological:      Mental Status: She is alert.          Medical Decision Making       40 MINUTES SPENT BY ME on the date of service doing chart review, history, exam, documentation & further " activities per the note.      Data     I have personally reviewed the following data over the past 24 hrs:    8.8  \   9.2 (L)   / 352     141 103 11.7 /  94   3.6 24 0.97 (H) \       Imaging results reviewed over the past 24 hrs:   No results found for this or any previous visit (from the past 24 hours).

## 2024-12-25 NOTE — PROGRESS NOTES
[facility] RCAT Note    Date:12/25/2024  Admission Diagnosis: Influenza A  Pulmonary History: Former Smoker  Home Nebulizer/ MDI Use: None  Home Oxygen Use: None  Acuity Level (from RT Assessment flow sheet): 4    Aerosol Therapy Initiated: Duoneb QID discontinued per RCAT protocol, pt has existing order for Albuterol Q6 PRN.       Current Oxygen Requirement: 1 LPM  Current SpO2: 96%    See 'RT Assessments' flow sheet for patient assessment scoring and Acuity Level Details.

## 2024-12-25 NOTE — PLAN OF CARE
Summary: CAP, ETOH    DATE & TIME: 12/24/24, 1930 - 2330    Cognitive Concerns/ Orientation : A&O x 4   BEHAVIOR & AGGRESSION TOOL COLOR: Green  CIWA SCORE: 1    ABNL VS/O2: VSS on 1.5 LPM  MOBILITY: Assist x 1, GB/W. Up to BSC  PAIN MANAGMENT: Prn Tylenol given for headache  DIET: Regular.  Needs help ordering  BOWEL/BLADDER: Incontinent. Of urine at time. Up to BSC  ABNL LAB/BG:    DRAIN/DEVICES: PIV SL  TELEMETRY RHYTHM: NA  SKIN: Scattered bruising. Small abrasion on right nostril. BLE edema +1-2. Dry flaky skin  TESTS/PROCEDURES: NA  D/C DATE: Pending. Daughter looking into hospice for discharge  OTHER IMPORTANT INFO: Family does not want any aggressive treatments or procedures.

## 2024-12-25 NOTE — PLAN OF CARE
Goal Outcome Evaluation:  Summary: CAP, ETOH  DATE & TIME: 12/24/24 [id number]    Cognitive Concerns/ Orientation : A&Ox4   BEHAVIOR & AGGRESSION TOOL COLOR: Green  CIWA SCORE: 2,    ABNL VS/O2: VSS on 2L NC  MOBILITY: A1 GB/W. Up to chair and BSC  PAIN MANAGMENT: Denies  DIET: Regular w/ snacks. Needs help ordering  BOWEL/BLADDER: Incontinent. Purewick in bed  ABNL LAB/BG: ,   DRAIN/DEVICES: R PIV SL  TELEMETRY RHYTHM: NA  SKIN: Scattered bruising. Small abrasion on R nostril. BLE edema +1-2. Dry flaky skin  TESTS/PROCEDURES: No new  D/C DATE: Pending. Daughter looking into hospice for discharge  OTHER IMPORTANT INFO: Family does not want any aggressive treatments or procedures.

## 2024-12-25 NOTE — PROGRESS NOTES
[facility]    Medicine Progress Note - Hospitalist Service    Date of Admission:  12/21/2024    Assessment & Plan   Janet Cope is a 89 year old female with past medical history significant for daily alcohol use, diabetes mellitus type 2 who presents with cough, shortness of breath, headache, poor intake. Admitted on 12/21/2024.      Influenza A  Suspected super-imposed community acquired pneumonia   Acute hypoxic respiratory failure   # Volume overload  *Presents with productive cough, shortness of breath, headache, poor appetite. Symptom onset 12/14.  *Influenza A PCR positive 12/21.   *CXR with patchy bilateral pulmonary infiltrates, greatest in mid and lower lungs, suspicious for pneumonitis   *Oxygen saturation 86% on room air.   *Afebrile. WBC and lactic acid normal. Procalcitonin 1.32.  - Oseltamivir to complete 5 day course  - Ceftriaxone IV, azithromycin PO.  - MRSA swab negative and discontinued ancomycin on 12/22/24  - Sputum culture  pending  - Oxygen PRN, wean as tolerated   - - PT consulted   -Wbc 13.4 on 12/22/24 down to 11.3 on 12/23/24  -Chest xray shows increased pulmonary opacities likely fluid and patient was given 1 dose of Lasix on 12/22/2024 and lasix second dose on 12/23/24  -Cough suppressants changed to liquid form and scheduled from prn  -Down to 2liters oxygen      # Acute metabolic encephalopathy Resolved  Likely due to underlying influenza  -Delirium precautions:  Up during the day with lights on  Lights off at night, avoid interruptions during the night as much as possible  Family visits  Encourage wearing glasses  Reorientation  Avoid opioids, benzodiazepines, anticholinergics.    Continue to ensure proper nutrition, fluid and electrolyte balance. Monitor for infections, hypoxia, metabolic derangements, or other causes of delirium.            Diabetes mellitus, type 2, diet-controlled   HgbA1c 5.9% 9/28/24. Glucose adequately controlled on BMP.   -  HgbA1c  - Medium sliding scale insulin. Consider discontinuation if HgbA1c well-controlled on diet alone and blood sugars not requiring treatment     Alcohol use disorder  *Family reports patient drinks 1 box of wine every 2 days, patient reports 3 glasses daily. Daily use since   2 years ago  *No interest in quitting or cutting back   - CIWA with lorazepam  - MVI, thiamine  -Monitor closely for CIWA currently not withdrawing  Cardiac murmur  Patient denies previous knowledge of murmur.   -Family declined Echo            Chronic kidney disease, stage 3  Baseline creatinine 0.9-1.1. Creatinine 1.04 on admission.   - Currently around baseline  - Avoid nephrotoxins  - Monitor BMP       Hypertension  - Continue prior to admission amlodipine, losartan when dose confirmed by pharmacy      Depression  Anxiety  - Continue prior to admission escitalopram, buspirone when dose confirmed by pharmacy      GERD  - Continue prior to admission PPI        Hyperlipidemia  - Continue prior to admission atorvastatin      # Hyponatremia  Mild improving      # Family communication  Goals of care were discussed with the family on 24 family does not want any aggressive interventions, procedures.  Patient's family declined echocardiogram.  For family patient's comfort and safety are important.  Okay to treat influenza and bacterial pneumonia.  Goals of care were again on 24 with patient's granddaughter and present plan for daughter looking for hospice at the time of discharge  Stopped multivitamins and folic acid to decrease pill burden.    Patient's daughter Moraima at bedside on 2024          Diet: Combination Diet Regular Diet Adult  Snacks/Supplements Adult: Glucerna; With Meals    DVT Prophylaxis: Enoxaparin (Lovenox) SQ  Arroyo Catheter: Not present  Lines: None     Cardiac Monitoring: None  Code Status: No CPR- Do NOT Intubate      Clinically Significant Risk Factors                   # Hypertension: Noted  "on problem list            # Obesity: Estimated body mass index is 32.39 kg/m  as calculated from the following:    Height as of this encounter: 1.626 m (5' 4\").    Weight as of this encounter: 85.6 kg (188 lb 11.4 oz)., PRESENT ON ADMISSION            Social Drivers of Health    Tobacco Use: Medium Risk (12/21/2024)    Patient History     Smoking Tobacco Use: Former     Smokeless Tobacco Use: Never   Physical Activity: Not on File (8/26/2019)    Received from [facility]    Physical Activity     Physical Activity: 0   Stress: Not on File (8/26/2019)    Received from [facility]    Stress     Stress: 0    Received from [employer] [employer]    Social Connections          Disposition Plan     Medically Ready for Discharge: Anticipated in 2-4 Days             Aquilino Crandall MD  Hospitalist Service  [facility]  Securely message with [employer] (more info)  Text page via [email or web address] Paging/Directory   ______________________________________________________________________    Interval History     No acute overnight events patient feeds much better since admission still feels fatigued currently on 2 L of oxygen.     Physical Exam   Vital Signs: Temp: 98.3  F (36.8  C) Temp src: Oral BP: 107/57 Pulse: 83   Resp: 18 SpO2: 98 % O2 Device: Nasal cannula Oxygen Delivery: 2 LPM  Weight: 188 lbs 11.42 oz    Physical Exam  HENT:      Nose:      Comments: Mild blister on the right side of the nose.  Pulmonary:      Effort: Pulmonary effort is normal. No respiratory distress.      Comments: Coarse breath sounds bilaterally  Abdominal:      General: There is no distension.      Palpations: Abdomen is soft.      Tenderness: There is no abdominal tenderness.   Neurological:      Mental Status: She is alert.          Medical Decision Making       Data     I have personally reviewed the following data over the past 24 hrs:    9.6  \   9.0 " (L)   / 324     141 104 11.2 /  126 (H)   3.5 24 0.77; 0.77 \       Imaging results reviewed over the past 24 hrs:   No results found for this or any previous visit (from the past 24 hours).

## 2024-12-25 NOTE — PLAN OF CARE
Goal Outcome Evaluation:         Summary: CAP, ETOH  DATE & TIME: 12/24/24-12/25/24 23:00-07:30      Cognitive Concerns/ Orientation : A&O x 4; can be forgetful  BEHAVIOR & AGGRESSION TOOL COLOR: Green  CIWA SCORE: 0,    ABNL VS/O2: VSS, 94% on 1.5 LPM   MOBILITY: Assist x 1, GB/W. Stayed most of the night on chair  PAIN MANAGMENT: denies pain  DIET: Regular.  (Needs help ordering)  BOWEL/BLADDER: Incontinent of urine at time. Up to BSC; no BM this shift  ABNL LAB/BG:   DRAIN/DEVICES: R PIV SL  TELEMETRY RHYTHM: NA  SKIN: Scattered bruising. Small abrasion on right nostril. BLE edema +1-2. Dry flaky skin  TESTS/PROCEDURES: NA  D/C DATE: Pending. Daughter looking into hospice for discharge  OTHER IMPORTANT INFO: Infrequent Productive cough, Family does not want any aggressive treatments or procedures.

## 2024-12-26 ENCOUNTER — DOCUMENTATION ONLY (OUTPATIENT)
Dept: OTHER | Facility: CLINIC | Age: 89
End: 2024-12-26
Payer: MEDICARE

## 2024-12-26 VITALS
SYSTOLIC BLOOD PRESSURE: 129 MMHG | RESPIRATION RATE: 18 BRPM | DIASTOLIC BLOOD PRESSURE: 51 MMHG | WEIGHT: 188.71 LBS | BODY MASS INDEX: 32.22 KG/M2 | OXYGEN SATURATION: 91 % | HEART RATE: 88 BPM | TEMPERATURE: 98.4 F | HEIGHT: 64 IN

## 2024-12-26 LAB
ANION GAP SERPL CALCULATED.3IONS-SCNC: 13 MMOL/L (ref 7–15)
BACTERIA BLD CULT: NO GROWTH
BACTERIA BLD CULT: NO GROWTH
BUN SERPL-MCNC: 14.1 MG/DL (ref 8–23)
CALCIUM SERPL-MCNC: 9 MG/DL (ref 8.8–10.4)
CHLORIDE SERPL-SCNC: 102 MMOL/L (ref 98–107)
CREAT SERPL-MCNC: 1.5 MG/DL (ref 0.51–0.95)
CREAT SERPL-MCNC: 1.5 MG/DL (ref 0.51–0.95)
EGFRCR SERPLBLD CKD-EPI 2021: 33 ML/MIN/1.73M2
EGFRCR SERPLBLD CKD-EPI 2021: 33 ML/MIN/1.73M2
ERYTHROCYTE [DISTWIDTH] IN BLOOD BY AUTOMATED COUNT: 18.2 % (ref 10–15)
GLUCOSE BLDC GLUCOMTR-MCNC: 104 MG/DL (ref 70–99)
GLUCOSE BLDC GLUCOMTR-MCNC: 109 MG/DL (ref 70–99)
GLUCOSE BLDC GLUCOMTR-MCNC: 87 MG/DL (ref 70–99)
GLUCOSE BLDC GLUCOMTR-MCNC: 93 MG/DL (ref 70–99)
GLUCOSE BLDC GLUCOMTR-MCNC: 98 MG/DL (ref 70–99)
GLUCOSE SERPL-MCNC: 98 MG/DL (ref 70–99)
HCO3 SERPL-SCNC: 22 MMOL/L (ref 22–29)
HCT VFR BLD AUTO: 28.6 % (ref 35–47)
HGB BLD-MCNC: 9.2 G/DL (ref 11.7–15.7)
MCH RBC QN AUTO: 25.7 PG (ref 26.5–33)
MCHC RBC AUTO-ENTMCNC: 32.2 G/DL (ref 31.5–36.5)
MCV RBC AUTO: 80 FL (ref 78–100)
PLATELET # BLD AUTO: 376 10E3/UL (ref 150–450)
POTASSIUM SERPL-SCNC: 3.6 MMOL/L (ref 3.4–5.3)
RBC # BLD AUTO: 3.58 10E6/UL (ref 3.8–5.2)
SODIUM SERPL-SCNC: 137 MMOL/L (ref 135–145)
WBC # BLD AUTO: 11 10E3/UL (ref 4–11)

## 2024-12-26 PROCEDURE — 120N000001 HC R&B MED SURG/OB

## 2024-12-26 PROCEDURE — 87798 DETECT AGENT NOS DNA AMP: CPT | Performed by: SPECIALIST

## 2024-12-26 PROCEDURE — 99223 1ST HOSP IP/OBS HIGH 75: CPT | Performed by: SPECIALIST

## 2024-12-26 PROCEDURE — 36415 COLL VENOUS BLD VENIPUNCTURE: CPT | Performed by: SPECIALIST

## 2024-12-26 PROCEDURE — 250N000011 HC RX IP 250 OP 636: Performed by: STUDENT IN AN ORGANIZED HEALTH CARE EDUCATION/TRAINING PROGRAM

## 2024-12-26 PROCEDURE — 85014 HEMATOCRIT: CPT | Performed by: STUDENT IN AN ORGANIZED HEALTH CARE EDUCATION/TRAINING PROGRAM

## 2024-12-26 PROCEDURE — 99232 SBSQ HOSP IP/OBS MODERATE 35: CPT | Performed by: STUDENT IN AN ORGANIZED HEALTH CARE EDUCATION/TRAINING PROGRAM

## 2024-12-26 PROCEDURE — 250N000013 HC RX MED GY IP 250 OP 250 PS 637: Performed by: INTERNAL MEDICINE

## 2024-12-26 PROCEDURE — 80048 BASIC METABOLIC PNL TOTAL CA: CPT | Performed by: STUDENT IN AN ORGANIZED HEALTH CARE EDUCATION/TRAINING PROGRAM

## 2024-12-26 PROCEDURE — 250N000011 HC RX IP 250 OP 636: Performed by: INTERNAL MEDICINE

## 2024-12-26 PROCEDURE — 258N000003 HC RX IP 258 OP 636: Performed by: STUDENT IN AN ORGANIZED HEALTH CARE EDUCATION/TRAINING PROGRAM

## 2024-12-26 PROCEDURE — 250N000013 HC RX MED GY IP 250 OP 250 PS 637: Performed by: STUDENT IN AN ORGANIZED HEALTH CARE EDUCATION/TRAINING PROGRAM

## 2024-12-26 PROCEDURE — 36415 COLL VENOUS BLD VENIPUNCTURE: CPT | Performed by: STUDENT IN AN ORGANIZED HEALTH CARE EDUCATION/TRAINING PROGRAM

## 2024-12-26 RX ORDER — SODIUM CHLORIDE, SODIUM LACTATE, POTASSIUM CHLORIDE, CALCIUM CHLORIDE 600; 310; 30; 20 MG/100ML; MG/100ML; MG/100ML; MG/100ML
INJECTION, SOLUTION INTRAVENOUS CONTINUOUS
Status: ACTIVE | OUTPATIENT
Start: 2024-12-26

## 2024-12-26 RX ORDER — ENOXAPARIN SODIUM 100 MG/ML
30 INJECTION SUBCUTANEOUS EVERY 24 HOURS
Status: DISPENSED | OUTPATIENT
Start: 2024-12-26

## 2024-12-26 RX ORDER — HYDROXYZINE HYDROCHLORIDE 10 MG/1
10 TABLET, FILM COATED ORAL
Status: ACTIVE | OUTPATIENT
Start: 2024-12-26

## 2024-12-26 RX ADMIN — GUAIFENESIN AND DEXTROMETHORPHAN 10 ML: 100; 10 SYRUP ORAL at 22:18

## 2024-12-26 RX ADMIN — ESCITALOPRAM OXALATE 10 MG: 10 TABLET ORAL at 08:16

## 2024-12-26 RX ADMIN — PANTOPRAZOLE SODIUM 40 MG: 40 TABLET, DELAYED RELEASE ORAL at 08:16

## 2024-12-26 RX ADMIN — ASPIRIN 81 MG: 81 TABLET, COATED ORAL at 08:16

## 2024-12-26 RX ADMIN — GUAIFENESIN AND DEXTROMETHORPHAN 10 ML: 100; 10 SYRUP ORAL at 13:36

## 2024-12-26 RX ADMIN — THIAMINE HYDROCHLORIDE 100 MG: 100 INJECTION, SOLUTION INTRAMUSCULAR; INTRAVENOUS at 08:16

## 2024-12-26 RX ADMIN — GUAIFENESIN AND DEXTROMETHORPHAN 10 ML: 100; 10 SYRUP ORAL at 10:14

## 2024-12-26 RX ADMIN — GUAIFENESIN AND DEXTROMETHORPHAN 10 ML: 100; 10 SYRUP ORAL at 06:44

## 2024-12-26 RX ADMIN — RAMELTEON 4 MG: 8 TABLET ORAL at 22:17

## 2024-12-26 RX ADMIN — GUAIFENESIN AND DEXTROMETHORPHAN 10 ML: 100; 10 SYRUP ORAL at 18:17

## 2024-12-26 RX ADMIN — GUAIFENESIN AND DEXTROMETHORPHAN 10 ML: 100; 10 SYRUP ORAL at 03:29

## 2024-12-26 RX ADMIN — CEFTRIAXONE SODIUM 2 G: 2 INJECTION, POWDER, FOR SOLUTION INTRAMUSCULAR; INTRAVENOUS at 08:15

## 2024-12-26 RX ADMIN — SODIUM CHLORIDE, POTASSIUM CHLORIDE, SODIUM LACTATE AND CALCIUM CHLORIDE: 600; 310; 30; 20 INJECTION, SOLUTION INTRAVENOUS at 13:36

## 2024-12-26 RX ADMIN — BUSPIRONE HYDROCHLORIDE 7.5 MG: 5 TABLET ORAL at 20:55

## 2024-12-26 RX ADMIN — ENOXAPARIN SODIUM 30 MG: 30 INJECTION SUBCUTANEOUS at 16:43

## 2024-12-26 RX ADMIN — Medication 1 MG: at 01:22

## 2024-12-26 RX ADMIN — LOSARTAN POTASSIUM 100 MG: 100 TABLET, FILM COATED ORAL at 08:16

## 2024-12-26 RX ADMIN — ATORVASTATIN CALCIUM 20 MG: 20 TABLET, FILM COATED ORAL at 08:16

## 2024-12-26 RX ADMIN — ACETAMINOPHEN 650 MG: 325 TABLET, FILM COATED ORAL at 16:43

## 2024-12-26 RX ADMIN — BUSPIRONE HYDROCHLORIDE 7.5 MG: 5 TABLET ORAL at 08:16

## 2024-12-26 RX ADMIN — AMLODIPINE BESYLATE 10 MG: 10 TABLET ORAL at 20:55

## 2024-12-26 ASSESSMENT — ACTIVITIES OF DAILY LIVING (ADL)
ADLS_ACUITY_SCORE: 71
ADLS_ACUITY_SCORE: 71
ADLS_ACUITY_SCORE: 67
ADLS_ACUITY_SCORE: 67
ADLS_ACUITY_SCORE: 71
ADLS_ACUITY_SCORE: 67
ADLS_ACUITY_SCORE: 71
ADLS_ACUITY_SCORE: 67
ADLS_ACUITY_SCORE: 67
ADLS_ACUITY_SCORE: 71
DEPENDENT_IADLS:: CLEANING;COOKING;LAUNDRY;SHOPPING;MEAL PREPARATION;TRANSPORTATION
ADLS_ACUITY_SCORE: 71
ADLS_ACUITY_SCORE: 67

## 2024-12-26 NOTE — PLAN OF CARE
Goal Outcome Evaluation:  Summary: CAP, ETOH, macular degeneration  DATE & TIME: 12/25/24 [id number]  Cognitive Concerns/ Orientation : A&Ox4; can be forgetful  BEHAVIOR & AGGRESSION TOOL COLOR: Green. C/o of anxiety - managed w/ scheduled buspar  CIWA SCORE: 0, 0, 0  ABNL VS/O2: VSS on 1-2L NC. JULES  MOBILITY: A1 GB/W. Changes position frequently between bed and chair.  PAIN MANAGMENT: C/o of lower back pain - gave PRN tylenol  DIET: Regular.  (Needs help ordering)  BOWEL/BLADDER: Incontinent at times. Up to BSC. Large BM this shift.  ABNL LAB/BG: , 94, 105  DRAIN/DEVICES: R PIV SL  TELEMETRY RHYTHM: NA  SKIN: Scattered bruising. Small abrasion on right nostril. Ear blister. BLE edema +1-2. Dry flaky skin.  TESTS/PROCEDURES: NA  D/C DATE: Pending. Daughter looking into hospice for discharge  OTHER IMPORTANT INFO: Family does not want any aggressive treatments or procedures.     Patient does not want robitussin overnight because she wakes up from coughing.

## 2024-12-26 NOTE — CONSULTS
[facility]    Infectious Disease Consultation     Date of Admission:  12/21/2024  Date of Consult : 12/26/24    Assessment:  89YF with history of diabetes and alcohol use, who has been admitted due to cough and shortness of breath, subsequently found to have influenza A with concern for superimposed bacterial pneumonia with acute hypoxic respiratory failure. She has bene treated with antiviral and antimicrobial therapy and has developed a right lateral nostril crusted lesion without additional spread. Distribution is not consistent with zoster ophthalmicus.    -Influenza A treated  -Superimposed bacterial pneumonia with acute hypoxic respiratory failure, improving clinically  -Right lateral nostril crusted lesion without additional spread. Possibly herpetic lesion but has crusted over.     Recommendations:  Discontinue Ceftriaxone (has completed treatment for pneumonia)  Patient has received a dose of Valtrex for presumed herpes simplex lesion  Distribution of nasal skin lesion is not consistent with zoster ophthalmicus. Lesion has crusted and no additional treatment is advised at this time  Check VZV PCR  Monitor clinically  ID will sign off, please call us back as needed      Melissa Montero MD    Reason for Consult   Reason for consult: I was asked to evaluate this patient for concern for zoster.    Primary Care Physician   Frank Kovacs    Chief Complaint   Right nasal lesion    History is obtained from the patient and medical records    History of Present Illness   Janet Cope is a 89 year old female with history of diabetes and alcohol use, who has been admitted due to cough and shortness of breath, subsequently found to have influenza A with concern for superimposed bacterial pneumonia with acute hypoxic respiratory failure. She has bene treated with antiviral and antimicrobial therapy and has developed a right lateral nostril crusted lesion several days ago without pain, tenderness  numbness and without additional spread.     Patient herself is unaware of the lesion and this was noted by her family. She thinks she may have scratched her ose. A dose of Valtrex was given yesterday due to concern for a cold sore. Patient does report getting frequent cold sores. She has no additional complaints today, she is feeling better overall from the respiratory stand point    Past Medical History   I have reviewed this patient's medical history and updated it with pertinent information if needed.   Past Medical History:   Diagnosis Date    Anxiety     Basal cell carcinoma     CKD (chronic kidney disease) stage 3, GFR 30-59 ml/min (H)     Depression     Diverticulosis of colon (without mention of hemorrhage) 8/02    colonoscopy negative other than sigmoid diverticulosis    Enthesopathy of hip region     Bilateral    Essential hypertension, benign     Generalized osteoarthrosis, unspecified site     Macular degeneration     Macular degeneration, dry     Osteoporosis, unspecified     Other specified gastritis without mention of hemorrhage     Personal history of colonic polyps 7/03     had hyperplastic polyps 2013    Personal history of malignant neoplasm of breast 1995 & 9/01    left breast cancer (1995), right breast cancer (9/01); patient sees Dr. Martinez regularly    Postmenopausal bleeding 5/02    endometrial biopsies negative; s/p D & C    Type 2 diabetes mellitus with diabetic chronic kidney disease  (goal A1C<7) 10/24/2015    Unspecified hemorrhoids without mention of complication 10/05    Internal, s/p banding ligature       Past Surgical History   I have reviewed this patient's surgical history and updated it with pertinent information if needed.  Past Surgical History:   Procedure Laterality Date    BREAST LUMPECTOMY, RT/LT  1995    Breast Lumpectomy RT    BREAST LUMPECTOMY, RT/LT  09/2001    left    BREAST RADIATION TX RT/LT  1995    right     BREAST RADIATION TX RT/LT  09/2001    left    HYSTERECTOMY,  VAGINAL      STRESS ECHO (METRO)  04/2002    normal       Prior to Admission Medications   Prior to Admission Medications   Prescriptions Last Dose Informant Patient Reported? Taking?   NEW MED   No Yes   Sig: One Touch Lancet device   acetaminophen (TYLENOL) 650 MG CR tablet   Yes Yes   Sig: Take 1 tablet (650 mg) by mouth nightly as needed for mild pain or fever   amLODIPine (NORVASC) 10 MG tablet 12/20/2024 Evening  No Yes   Sig: TAKE 1 TABLET(10 MG) BY MOUTH DAILY IN THE EVENING   Patient taking differently: Take 10 mg by mouth every evening.   aspirin (ASA) 81 MG tablet 12/20/2024 Morning  Yes Yes   Sig: Take 81 mg by mouth daily.   atorvastatin (LIPITOR) 20 MG tablet 12/20/2024 Morning  No Yes   Sig: TAKE 1 TABLET(20 MG) BY MOUTH DAILY   busPIRone (BUSPAR) 15 MG tablet 12/20/2024 Evening  No Yes   Sig: Take 0.5 tablets (7.5 mg) by mouth 2 times daily For anxiety   escitalopram (LEXAPRO) 10 MG tablet 12/20/2024 Morning  No Yes   Sig: TAKE 1 TABLET(10 MG) BY MOUTH DAILY   Patient taking differently: Take 10 mg by mouth daily.   guaiFENesin-codeine (ROBITUSSIN AC) 100-10 MG/5ML solution   No Yes   Sig: Take 5-10 mLs by mouth every 6 hours as needed for cough   losartan (COZAAR) 100 MG tablet 12/20/2024 Morning  No Yes   Sig: Take 1 tablet (100 mg) by mouth daily   omeprazole (PRILOSEC) 20 MG DR capsule 12/20/2024 Evening  No Yes   Sig: TAKE 1 CAPSULE(20 MG) BY MOUTH TWICE DAILY   Patient taking differently: Take 20 mg by mouth 2 times daily.   order for DME   No Yes   Sig: One Touch Ultra test strips. Checking sugars daily      Facility-Administered Medications: None     Allergies   Allergies   Allergen Reactions    Aromasin [Exemestane]      Muscle weakness, pain in legs    Lisinopril      Cough and Dizziness.       Immunization History   Immunization History   Administered Date(s) Administered    COVID-19 12+ (Pfizer) 10/11/2023, 10/02/2024    COVID-19 Bivalent 12+ (Pfizer) 09/29/2022    COVID-19 Monovalent 18+  (Moderna) 2021, 2021, 2021, 2022    Influenza (High Dose) Trivalent,PF (Fluzone) 2010, 2016, 2017, 10/16/2018, 2019, 10/02/2024    Influenza (IIV3) PF 1998, 10/07/2005, 2009, 10/01/2011, 2012    Influenza Vaccine 65+ (Fluzone HD) 2020, 2021, 2022, 10/11/2023    Pneumo Conj 13-V (2010&after) 2015    Pneumococcal 20 valent Conjugate (Prevnar 20) 10/02/2024    Pneumococcal 23 valent 2003, 2015    RSV Vaccine (Abrysvo) 10/29/2023    TD,PF 7+ (Tenivac) 2002, 2002    TDAP (Adacel,Boostrix) 2023    TDAP Vaccine (Adacel) 2013    Zoster recombinant adjuvanted (SHINGRIX) 10/16/2018, 2018    Zoster vaccine, live 2013       Social History   I have reviewed this patient's social history and updated it with pertinent information if needed. Janet Cope  reports that she quit smoking about 43 years ago. Her smoking use included cigarettes. She has never used smokeless tobacco. She reports current alcohol use of about 21.0 - 28.0 standard drinks of alcohol per week. She reports that she does not use drugs.    Family History   I have reviewed this patient's family history and updated it with pertinent information if needed.   Family History   Problem Relation Age of Onset    Diabetes Mother             Heart Disease Mother        Review of Systems   The 10 point Review of Systems is negative    Physical Exam   Temp: 98.2  F (36.8  C) Temp src: Oral BP: (!) 149/69 Pulse: 91   Resp: 18 SpO2: 95 % O2 Device: Nasal cannula Oxygen Delivery: 1 LPM  Vital Signs with Ranges  Temp:  [97.5  F (36.4  C)-98.4  F (36.9  C)] 98.2  F (36.8  C)  Pulse:  [89-92] 91  Resp:  [18] 18  BP: (121-149)/(57-71) 149/69  SpO2:  [94 %-96 %] 95 %  188 lbs 11.42 oz  Body mass index is 32.39 kg/m .    GENERAL APPEARANCE:  awake  EYES: Eyes grossly normal to inspection  HEENT : crusted lesion on the lateral right  nostril , no pain or tenderness, no obvious vesicles  RESP: lungs clear   MS: LE edema  SKIN: no other  rashes        Data   All laboratory data reviewed  Component      Latest Ref Rng 12/26/2024  6:42 AM   Sodium      135 - 145 mmol/L 137    Potassium      3.4 - 5.3 mmol/L 3.6    Chloride      98 - 107 mmol/L 102    Carbon Dioxide (CO2)      22 - 29 mmol/L 22    Anion Gap      7 - 15 mmol/L 13    Urea Nitrogen      8.0 - 23.0 mg/dL 14.1    Creatinine      0.51 - 0.95 mg/dL 1.50 (H)    GFR Estimate      >60 mL/min/1.73m2 33 (L)    Calcium      8.8 - 10.4 mg/dL 9.0    Glucose      70 - 99 mg/dL 98    WBC      4.0 - 11.0 10e3/uL 11.0    RBC Count      3.80 - 5.20 10e6/uL 3.58 (L)    Hemoglobin      11.7 - 15.7 g/dL 9.2 (L)    Hematocrit      35.0 - 47.0 % 28.6 (L)    MCV      78 - 100 fL 80    MCH      26.5 - 33.0 pg 25.7 (L)    MCHC      31.5 - 36.5 g/dL 32.2    RDW      10.0 - 15.0 % 18.2 (H)    Platelet Count      150 - 450 10e3/uL 376       Microbiology  12/21/2024 2057 12/21/2024 2303 Respiratory Aerobic Bacterial Culture with Gram Stain [[id number]]   Sputum from Expectorate    Final result Component Value   Culture >10 Squamous epithelial cells/low power field indicates oral contamination. Please recollect.   Gram Stain Result >10 Squamous epithelial cells/low power field    >25 PMNs/low power field    2+ Mixed prabhjot             12/21/2024 1501 12/21/2024 1926 MRSA MSSA PCR, Nasal Swab [[id number]]    Swab from Nare, Left    Final result Component Value   MRSA Target DNA Negative   SA Target DNA Negative          12/21/2024 0810 12/25/2024 1205 Blood Culture Peripheral Blood [[id number]]   Peripheral Blood    Preliminary result Component Value   Culture No growth after 4 days P             12/21/2024 0810 12/25/2024 1205 Blood Culture Peripheral Blood [[id number]]   Peripheral Blood    Preliminary result Component Value   Culture No growth after 4 days P             12/21/2024 0425 12/21/2024 0519  Influenza A/B, RSV and SARS-CoV2 PCR (COVID-19) Nasopharyngeal [[id number]]    (Abnormal)   Swab from Nasopharyngeal    Final result Component Value   Influenza A PCR Positive Abnormal    Influenza B PCR Negative   RSV PCR Negative   SARS CoV2 PCR Negative        Imaging  EXAM: XR CHEST PORT 1 VIEW  LOCATION: [facility]  DATE: 12/22/2024     INDICATION: Shortness of breath.  COMPARISON: Chest x-ray 12/21/2024.                                                                      IMPRESSION: Bilateral increased vascular opacities. Patchy bilateral pulmonary opacities also noted. This could represent pulmonary edema. Other airspace disease not excluded including pneumonia versus neoplastic causes. Normal cardiac silhouette.

## 2024-12-26 NOTE — PROGRESS NOTES
[facility]    Medicine Progress Note - Hospitalist Service    Date of Admission:  12/21/2024    Assessment & Plan   Janet Cope is a 89 year old female with past medical history significant for daily alcohol use, diabetes mellitus type 2 who presents with cough, shortness of breath, headache, poor intake. Admitted on 12/21/2024.      Influenza A  Suspected super-imposed community acquired pneumonia   Acute hypoxic respiratory failure   # Volume overload  *Presents with productive cough, shortness of breath, headache, poor appetite. Symptom onset 12/14.  *Influenza A PCR positive 12/21.   *CXR with patchy bilateral pulmonary infiltrates, greatest in mid and lower lungs, suspicious for pneumonitis   *Oxygen saturation 86% on room air.   *Afebrile. WBC and lactic acid normal. Procalcitonin 1.32.  - Oseltamivir to complete 5 day course  - Ceftriaxone IV, azithromycin PO.  - MRSA swab negative and discontinued ancomycin on 12/22/24  - Sputum culture  pending  - Oxygen PRN, wean as tolerated   - - PT consulted   -Wbc 13.4 on 12/22/24 down to 11.3 on 12/23/24  -Chest xray shows increased pulmonary opacities likely fluid and patient was given 1 dose of Lasix on 12/22/2024 and lasix second dose on 12/23/24  -Cough suppressants changed to liquid form and scheduled from prn on 12/23/24  -Down to room air       # Acute metabolic encephalopathy Resolved  Likely due to underlying influenza  -Delirium precautions:  Up during the day with lights on  Lights off at night, avoid interruptions during the night as much as possible  Family visits  Encourage wearing glasses  Reorientation  Avoid opioids, benzodiazepines, anticholinergics.    Continue to ensure proper nutrition, fluid and electrolyte balance. Monitor for infections, hypoxia, metabolic derangements, or other causes of delirium.     # Insomnia  # Anxiety   -Will start low dose Ramlteon  -Hydroxyzine as needed once daily 10 mg twice daily            Diabetes mellitus, type 2, diet-controlled   HgbA1c 5.9% 24. Glucose adequately controlled on BMP.   - HgbA1c  - Medium sliding scale insulin. Consider discontinuation if HgbA1c well-controlled on diet alone and blood sugars not requiring treatment     Alcohol use disorder  *Family reports patient drinks 1 box of wine every 2 days, patient reports 3 glasses daily. Daily use since   2 years ago  *No interest in quitting or cutting back   - CIWA with lorazepam  - MVI, thiamine  -Monitor closely for CIWA currently not withdrawing  Cardiac murmur  Patient denies previous knowledge of murmur.   -Family declined Echo            TRINI chronic kidney disease, stage 3  Baseline creatinine 0.9-1.1. Creatinine 1.04 on admission.   - Currently around baseline  - Avoid nephrotoxins  - Monitor BMP    -Gentle hydration monitor for volume overload  -Hold losartan     Hypertensionlosartan  -Hold losartan due to TRINI     Depression  Anxiety  - Continue prior to admission escitalopram, buspirone when dose confirmed by pharmacy      GERD  - Continue prior to admission PPI        Hyperlipidemia  - Continue prior to admission atorvastatin      # Hyponatremia  Mild improving    # Ear Blister  Not painful  Will continue to monitor  -Infectious disease consulted patient's lesion is already crusted and as per ID not consistent with zoster ophthalmicus and no further treatment is advised.  -Patient received 1 dose of Valtrex    # Constipation  Bowel regimen       # Family communication  Updated patient's granddaughter Kristi and patient's daughter at bedside        Diet: Combination Diet Regular Diet Adult  Snacks/Supplements Adult: Glucerna; With Meals    DVT Prophylaxis: Enoxaparin (Lovenox) SQ  Arroyo Catheter: Not present  Lines: None     Cardiac Monitoring: None  Code Status: No CPR- Do NOT Intubate      Clinically Significant Risk Factors                  # Acute Kidney Injury, unspecified: based on a >150% or 0.3 mg/dL  "increase in last creatinine compared to past 90 day average, will monitor renal function  # Hypertension: Noted on problem list            # Obesity: Estimated body mass index is 32.39 kg/m  as calculated from the following:    Height as of this encounter: 1.626 m (5' 4\").    Weight as of this encounter: 85.6 kg (188 lb 11.4 oz).             Social Drivers of Health    Tobacco Use: Medium Risk (12/21/2024)    Patient History     Smoking Tobacco Use: Former     Smokeless Tobacco Use: Never   Physical Activity: Not on File (8/26/2019)    [facility] from [facility]    Physical Activity     Physical Activity: 0   Stress: Not on File (8/26/2019)    Received from [facility]    Stress     Stress: 0    Received from [email or web address] [email or web address]    Social Connections          Disposition Plan     Medically Ready for Discharge: Anticipated Tomorrow             Aquilino Crandall MD  Hospitalist Service  [facility]  Securely message with [employer] (more info)  Text page via [employer] Paging/Directory   ______________________________________________________________________    Interval History     Patient did not sleep well last night.  She was also anxious in the night.  Denies any chest pain denies any shortness of breath    Family wants to discuss about discharge on hospice and case management consulted      Discussed with patient's nurse Jeanna    Physical Exam   Vital Signs: Temp: 98.2  F (36.8  C) Temp src: Oral BP: (!) 149/69 Pulse: 91   Resp: 18 SpO2: 93 % O2 Device: None (Room air) Oxygen Delivery: 1 LPM  Weight: 188 lbs 11.42 oz    Physical Exam  Cardiovascular:      Rate and Rhythm: Normal rate and regular rhythm.      Heart sounds: Murmur heard.   Pulmonary:      Effort: Pulmonary effort is normal. No respiratory distress.      Breath sounds: Normal breath sounds.   Abdominal:      General: There is no distension.      " Palpations: Abdomen is soft.      Tenderness: There is no abdominal tenderness.          Medical Decision Making       45 MINUTES SPENT BY ME on the date of service doing chart review, history, exam, documentation & further activities per the note.      Data     I have personally reviewed the following data over the past 24 hrs:    11.0  \   9.2 (L)   / 376     137 102 14.1 /  87   3.6 22 1.50 (H); 1.50 (H) \       Imaging results reviewed over the past 24 hrs:   No results found for this or any previous visit (from the past 24 hours).

## 2024-12-26 NOTE — PROVIDER NOTIFICATION
MD Notification    Notified Person: MD    Notified Person Name: Raz    Notification Date/Time: 12/26/24 4216    Notification Interaction: Johnson    Purpose of Notification: I stopped the fluids .She is back on 1L and respirations increased and BP did a little. Think its early signs over load    Orders Received: I discontinued fluids    Comments:

## 2024-12-26 NOTE — CONSULTS
Care Management Initial Consult    General Information  Assessment completed with: Patient, Children, patient at bedside and Xena over the phone  Type of CM/SW Visit: Initial Assessment    Primary Care Provider verified and updated as needed: Yes (Dr. Kovacs)   Readmission within the last 30 days: no previous admission in last 30 days      Reason for Consult: discharge planning  Advance Care Planning: Advance Care Planning Reviewed: no concerns identified          Communication Assessment  Patient's communication style: spoken language (English or Bilingual)    Hearing Difficulty or Deaf: no   Wear Glasses or Blind: yes    Cognitive  Cognitive/Neuro/Behavioral: WDL                      Living Environment:   People in home: child(shey), adult     Current living Arrangements: apartment      Able to return to prior arrangements: yes  Living Arrangement Comments: elevator; daughter Xena lives with her; daughter's all support her    Family/Social Support:  Care provided by: self, child(shey)  Provides care for: no one  Marital Status:   Support system: Children          Description of Support System: Supportive, Involved         Current Resources:   Patient receiving home care services: No        Community Resources: None  Equipment currently used at home: walker, rolling, shower chair, grab bar, tub/shower  Supplies currently used at home:      Employment/Financial:  Employment Status: retired        Financial Concerns:             Does the patient's insurance plan have a 3 day qualifying hospital stay waiver?  Yes     Which insurance plan 3 day waiver is available? [employer]    Will the waiver be used for post-acute placement? No    Lifestyle & Psychosocial Needs:  Social Drivers of Health     Food Insecurity: Low Risk  (10/13/2023)    Food Insecurity     Within the past 12 months, did you worry that your food would run out before you got money to buy more?: No     Within the past 12 months, did the food you  bought just not last and you didn t have money to get more?: No   Depression: Not at risk (5/24/2024)    PHQ-2     PHQ-2 Score: 0   Housing Stability: Low Risk  (10/13/2023)    Housing Stability     Do you have housing? : Yes     Are you worried about losing your housing?: No   Tobacco Use: Medium Risk (12/21/2024)    Patient History     Smoking Tobacco Use: Former     Smokeless Tobacco Use: Never     Passive Exposure: Not on file   Financial Resource Strain: Low Risk  (10/13/2023)    Financial Resource Strain     Within the past 12 months, have you or your family members you live with been unable to get utilities (heat, electricity) when it was really needed?: No   Alcohol Use: Not At Risk (1/18/2021)    AUDIT-C     Frequency of Alcohol Consumption: 4 or more times a week     Average Number of Drinks: 1 or 2     Frequency of Binge Drinking: Never   Transportation Needs: Low Risk  (10/13/2023)    Transportation Needs     Within the past 12 months, has lack of transportation kept you from medical appointments, getting your medicines, non-medical meetings or appointments, work, or from getting things that you need?: No   Physical Activity: Not on File (8/26/2019)    Received from [facility]    Physical Activity     Physical Activity: 0   Interpersonal Safety: Low Risk  (12/21/2024)    Interpersonal Safety     Do you feel physically and emotionally safe where you currently live?: Yes     Within the past 12 months, have you been hit, slapped, kicked or otherwise physically hurt by someone?: No     Within the past 12 months, have you been humiliated or emotionally abused in other ways by your partner or ex-partner?: No   Stress: Not on File (8/26/2019)    Received from [facility]    Stress     Stress: 0   Social Connections: Unknown (1/1/2022)    [facility]    Social Connections     Frequency of Communication with Friends  and Family: Not on file   Health Literacy: Not on file       Functional Status:  Prior to admission patient needed assistance:   Dependent ADLs:: Ambulation-walker, Bathing  Dependent IADLs:: Cleaning, Cooking, Laundry, Shopping, Meal Preparation, Transportation       Mental Health Status:          Chemical Dependency Status:                Values/Beliefs:  Spiritual, Cultural Beliefs, Worship Practices, Values that affect care:                 Discussed  Partnership in Safe Discharge Planning  document with patient/family: No    Additional Information:  Met with patient at bedside; also called daughter Xena and spoke on the phone; introduced self and explained role in discharge planning.    Patient lives with arcelia Mccallum. Xena works during the day.  Janet states Xena helps minimally with dressing.  Preparing meals is an effort on both their parts; Janet can prepare simple foods.  Her oldest daughter provides assist with bathing and nails.  Her second oldest daughter provides patient with cleaning.  Transportation needs are offered by any family member and patient states she does not like getting out of the apartment.    Patient uses a [address] and has an elevator but the walk can be long so family does have access to a wheelchair which they will obtain to use at discharge.    Daughter Tip will be here in the morning and leticia Kristi would like care management to call her in the morning with an update and discuss her readiness to discharge.    A home care referral was placed for RN/PT/OT/HHA/LONI and the accepting agency is [facility] with a start of care date of 12/29 which was reviewed with Xena and thought adequate.  Also reviewed PT recommendation for 24/7 supervision at home.  Xena is home after work so family would have to work that out.  Will discuss again tomorrow with leticia and or Tip.        Next Steps: medical readiness to discharge likely tomorrow Friday 12/27.  Will follow and  contact niece and daughter tomorrow.    Caitlin Contreras RN  Inpatient Float Care Coordinator  [facility]  [email or web address]

## 2024-12-26 NOTE — CONSULTS
Care Management  Responding to MD consult for hospice at family request. Family prefers to bring patient home vs TCU and to avoid aggressive medical intervention per chart notes.  Writer reviewed the medical progress notes. Writer doesn't feel patient qualifies for hospice benefits however asked MD and Palliative Care APRN for their opinion.  Based on patient's medical picture, they are of the opinion patient doesn't have a qualifying hospice diagnosis.  It is suggested an Out Patient Palliative Care Consult can be helpful for further goals of care discussion.   Care Coordinator updated  She is making a referral for home RN, PT and OT.if Dr Crandall agrees.     Met with patient and discussed home care services which she is accepting of.   She reports she rarely leaves the home and is concerned how she will get from the apartment entrance down the hallway to her apartment.  Writer did share the idea of a medivan and the cost.  The Care Coordinator RN will take over the discharge planning and complete the consult by speaking with family contact regarding their ability to provide 24/7 supervision for mobility.   If they are unable to provide this TCU may be necessary.

## 2024-12-26 NOTE — PLAN OF CARE
Summary: CAP, ETOH, macular degeneration    DATE & TIME: 12/25/24, 1930 - 0730  Cognitive Concerns/ Orientation : A&O x 4; can be forgetful  BEHAVIOR & AGGRESSION TOOL COLOR: Green.   CIWA SCORE: 1, 0  ABNL VS/O2: VSS on O2 1 LPM. JULES  MOBILITY: Assist x 1, gait belt and walker. Up between bed and chair. But mostly up in recliner  PAIN MANAGMENT: Denied  DIET: Regular.  (Needs help ordering)  BOWEL/BLADDER: Incontinent at times. Up to BSC.  ABNL LAB/BG: , 104  DRAIN/DEVICES: PIV SL  TELEMETRY RHYTHM: NA  SKIN: Scattered bruising. Small abrasion on right nostril. Ear blister. BLE edema +1-2. Dry flaky skin.  TESTS/PROCEDURES: NA  D/C DATE: Pending. Daughter looking into hospice for discharge  OTHER IMPORTANT INFO: Family does not want any aggressive treatments or procedures.

## 2024-12-27 ENCOUNTER — APPOINTMENT (OUTPATIENT)
Dept: PHYSICAL THERAPY | Facility: CLINIC | Age: 89
End: 2024-12-27
Payer: MEDICARE

## 2024-12-27 LAB
ANION GAP SERPL CALCULATED.3IONS-SCNC: 13 MMOL/L (ref 7–15)
BUN SERPL-MCNC: 14.2 MG/DL (ref 8–23)
CALCIUM SERPL-MCNC: 8.9 MG/DL (ref 8.8–10.4)
CHLORIDE SERPL-SCNC: 105 MMOL/L (ref 98–107)
CREAT SERPL-MCNC: 1.29 MG/DL (ref 0.51–0.95)
EGFRCR SERPLBLD CKD-EPI 2021: 39 ML/MIN/1.73M2
ERYTHROCYTE [DISTWIDTH] IN BLOOD BY AUTOMATED COUNT: 18.5 % (ref 10–15)
GLUCOSE BLDC GLUCOMTR-MCNC: 83 MG/DL (ref 70–99)
GLUCOSE BLDC GLUCOMTR-MCNC: 89 MG/DL (ref 70–99)
GLUCOSE SERPL-MCNC: 88 MG/DL (ref 70–99)
HCO3 SERPL-SCNC: 23 MMOL/L (ref 22–29)
HCT VFR BLD AUTO: 27.6 % (ref 35–47)
HGB BLD-MCNC: 8.8 G/DL (ref 11.7–15.7)
MCH RBC QN AUTO: 25.7 PG (ref 26.5–33)
MCHC RBC AUTO-ENTMCNC: 31.9 G/DL (ref 31.5–36.5)
MCV RBC AUTO: 81 FL (ref 78–100)
PLATELET # BLD AUTO: 459 10E3/UL (ref 150–450)
POTASSIUM SERPL-SCNC: 3.6 MMOL/L (ref 3.4–5.3)
RBC # BLD AUTO: 3.42 10E6/UL (ref 3.8–5.2)
SODIUM SERPL-SCNC: 141 MMOL/L (ref 135–145)
WBC # BLD AUTO: 11.3 10E3/UL (ref 4–11)

## 2024-12-27 PROCEDURE — 250N000013 HC RX MED GY IP 250 OP 250 PS 637: Performed by: STUDENT IN AN ORGANIZED HEALTH CARE EDUCATION/TRAINING PROGRAM

## 2024-12-27 PROCEDURE — 250N000011 HC RX IP 250 OP 636: Performed by: STUDENT IN AN ORGANIZED HEALTH CARE EDUCATION/TRAINING PROGRAM

## 2024-12-27 PROCEDURE — 250N000013 HC RX MED GY IP 250 OP 250 PS 637: Performed by: INTERNAL MEDICINE

## 2024-12-27 PROCEDURE — 97116 GAIT TRAINING THERAPY: CPT | Mod: GP

## 2024-12-27 PROCEDURE — 85027 COMPLETE CBC AUTOMATED: CPT | Performed by: STUDENT IN AN ORGANIZED HEALTH CARE EDUCATION/TRAINING PROGRAM

## 2024-12-27 PROCEDURE — 80048 BASIC METABOLIC PNL TOTAL CA: CPT | Performed by: STUDENT IN AN ORGANIZED HEALTH CARE EDUCATION/TRAINING PROGRAM

## 2024-12-27 PROCEDURE — 82310 ASSAY OF CALCIUM: CPT | Performed by: STUDENT IN AN ORGANIZED HEALTH CARE EDUCATION/TRAINING PROGRAM

## 2024-12-27 PROCEDURE — 120N000001 HC R&B MED SURG/OB

## 2024-12-27 PROCEDURE — 36415 COLL VENOUS BLD VENIPUNCTURE: CPT | Performed by: STUDENT IN AN ORGANIZED HEALTH CARE EDUCATION/TRAINING PROGRAM

## 2024-12-27 PROCEDURE — 99232 SBSQ HOSP IP/OBS MODERATE 35: CPT | Performed by: STUDENT IN AN ORGANIZED HEALTH CARE EDUCATION/TRAINING PROGRAM

## 2024-12-27 PROCEDURE — 250N000011 HC RX IP 250 OP 636: Performed by: INTERNAL MEDICINE

## 2024-12-27 PROCEDURE — 97530 THERAPEUTIC ACTIVITIES: CPT | Mod: GP

## 2024-12-27 RX ORDER — ENOXAPARIN SODIUM 100 MG/ML
40 INJECTION SUBCUTANEOUS EVERY 24 HOURS
Status: DISCONTINUED | OUTPATIENT
Start: 2024-12-27 | End: 2024-12-28 | Stop reason: HOSPADM

## 2024-12-27 RX ADMIN — ACETAMINOPHEN 650 MG: 325 TABLET, FILM COATED ORAL at 09:37

## 2024-12-27 RX ADMIN — PANTOPRAZOLE SODIUM 40 MG: 40 TABLET, DELAYED RELEASE ORAL at 08:14

## 2024-12-27 RX ADMIN — GUAIFENESIN SYRUP AND DEXTROMETHORPHAN 10 ML: 100; 10 SYRUP ORAL at 15:31

## 2024-12-27 RX ADMIN — ACETAMINOPHEN 650 MG: 325 TABLET, FILM COATED ORAL at 18:21

## 2024-12-27 RX ADMIN — Medication 1 MG: at 20:11

## 2024-12-27 RX ADMIN — ESCITALOPRAM OXALATE 10 MG: 10 TABLET ORAL at 08:14

## 2024-12-27 RX ADMIN — GUAIFENESIN SYRUP AND DEXTROMETHORPHAN 10 ML: 100; 10 SYRUP ORAL at 18:04

## 2024-12-27 RX ADMIN — ASPIRIN 81 MG: 81 TABLET, COATED ORAL at 08:14

## 2024-12-27 RX ADMIN — ENOXAPARIN SODIUM 40 MG: 40 INJECTION SUBCUTANEOUS at 15:31

## 2024-12-27 RX ADMIN — BUSPIRONE HYDROCHLORIDE 7.5 MG: 5 TABLET ORAL at 08:13

## 2024-12-27 RX ADMIN — GUAIFENESIN SYRUP AND DEXTROMETHORPHAN 10 ML: 100; 10 SYRUP ORAL at 03:13

## 2024-12-27 RX ADMIN — ATORVASTATIN CALCIUM 20 MG: 20 TABLET, FILM COATED ORAL at 08:14

## 2024-12-27 RX ADMIN — THIAMINE HYDROCHLORIDE 100 MG: 100 INJECTION, SOLUTION INTRAMUSCULAR; INTRAVENOUS at 08:14

## 2024-12-27 RX ADMIN — BUSPIRONE HYDROCHLORIDE 7.5 MG: 5 TABLET ORAL at 20:11

## 2024-12-27 RX ADMIN — RAMELTEON 4 MG: 8 TABLET ORAL at 20:11

## 2024-12-27 RX ADMIN — AMLODIPINE BESYLATE 10 MG: 10 TABLET ORAL at 20:10

## 2024-12-27 RX ADMIN — GUAIFENESIN SYRUP AND DEXTROMETHORPHAN 10 ML: 100; 10 SYRUP ORAL at 08:13

## 2024-12-27 ASSESSMENT — ACTIVITIES OF DAILY LIVING (ADL)
ADLS_ACUITY_SCORE: 71
ADLS_ACUITY_SCORE: 71
ADLS_ACUITY_SCORE: 67
ADLS_ACUITY_SCORE: 67
ADLS_ACUITY_SCORE: 71
ADLS_ACUITY_SCORE: 67
ADLS_ACUITY_SCORE: 71
ADLS_ACUITY_SCORE: 67
ADLS_ACUITY_SCORE: 67
ADLS_ACUITY_SCORE: 71
ADLS_ACUITY_SCORE: 67
ADLS_ACUITY_SCORE: 67
ADLS_ACUITY_SCORE: 71

## 2024-12-27 NOTE — PROGRESS NOTES
Spoke with charge RN about removing patient from droplet precautions. After discussion we decided that patient could be removed from precautions due to lack of fever and other cause of pneumonia. Nazario Calhoun, Infection Prevention on 12/27/2024 at 2:25 PM

## 2024-12-27 NOTE — PROGRESS NOTES
Summary: CAP, ETOH, macular degeneration  DATE & TIME: 12/26/24 [id number]  Cognitive Concerns/ Orientation : A&Ox4; can be forgetful  BEHAVIOR & AGGRESSION TOOL COLOR: Green.   CIWA SCORE: 0, 0, 0  ABNL VS/O2: VSS on RA ex HTN. JULES but can maintain sats in 90s  MOBILITY: A1 GB/W. Up to chair and BSC  PAIN MANAGMENT: C/o of lower back pain - gave PRN tylenol x1  DIET: Regular. Needs help ordering but family usually calls meals ahead.  BOWEL/BLADDER: Incontinent at times. BM this shift  ABNL LAB/BG: Cr 1.50, GFR 33, , 87, 93  DRAIN/DEVICES: New R PIV SL  TELEMETRY RHYTHM: NA  SKIN: Scattered bruising. Small lesion on right nostril - unlikely zoster, no tx plan advised. BLE edema +1-2. Dry flaky skin.  TESTS/PROCEDURES: VZV PCR  D/C DATE: Pending. Granddaughter Kristi is POA - emailing documentation to honoring choices to update patient's chart. Family wants hospice at discharge but patient may not qualify. CC following regarding plan  OTHER IMPORTANT INFO: Family does not want any aggressive treatments or procedures.

## 2024-12-27 NOTE — PROGRESS NOTES
Care Management Follow Up    Length of Stay (days): 6    Expected Discharge Date: 12/28/2024     Concerns to be Addressed: discharge planning     Patient plan of care discussed at interdisciplinary rounds: Yes    Anticipated Discharge Disposition:                Anticipated Discharge Services:    Anticipated Discharge DME:      Patient/family educated on Medicare website which has current facility and service quality ratings:    Education Provided on the Discharge Plan:    Patient/Family in Agreement with the Plan: yes    Referrals Placed by CM/SW:    Private pay costs discussed: Not applicable    Discussed  Partnership in Safe Discharge Planning  document with patient/family: No     Handoff Completed: No, handoff not indicated or clinically appropriate    Additional Information:  Called patient's niece Kristi.  She states she is an RN and involved in the medical side of things for Janet.     Kristi was inquiring about resources as follows:    - She was asking for a care conference to discuss with family what end of life could mean for her aunt.  Writer sees urgent need and acknowledges the social workers note on 12/26.  No current qualifying diagnosis for hospice.  Dr. Crandall will order an Outpatient Palliative Care referral.  Also in the referral to home care a SW was included.  Krisit thought this was reasonable to do outpatient.    - She was inquiring about home health care.  Explained that Janet has been accepted by [facility] with a start of care on Sunday 12/29.  Explained that these are in and out visits by nurse, PT, OT only a couple times a week at most.  Did suggest private pay home care and what that may look like.  Will email Kristi a list.    - She asked about PCA services.  Explained to her that requires a waiver from the [facility].  Provided the [facility] Front Door number on the email to her as well as the AVS.    - She asked about benefits from the [facility].  On the email provided the [facility]  phone number and website for [facility]; also added to the AVS.    - She asked about DME for commode, shower chair; recommended she obtain from [employer] or pharmacy; not covered by Medicare.  She does have a walker.    - She asked if there was any other resources for daily living assistance.  Provided the Senior LinkAge Line on the email and on the AVS.  Also the number to the [facility] provided.    - She inquired about Fall Alert system.  Provided her on the email the AC pamphlet regarding Health Alert Systems and also left in room for patient.    The email address for Kristi is: [email or web address].  Family will work out their schedules so Janet will have supervision at home.  Kristi states they are anticipating her home tomorrow likely.    Next Steps: medical readiness for discharge      Caitlin Contreras RN  Inpatient Float Care Coordinator  [facility]  [email or web address]

## 2024-12-27 NOTE — DISCHARGE INSTRUCTIONS
Resources for support at home:   + the senior linkage line (information below)     The Senior LinkAge Line is a free statewide service of the [place] [employer] Aging in partnership with [place] agencies on aging. The Senior LinkAge Line assists older [place] and caregivers, by connecting them to local services, finding answers and getting the help they need.  Call (067) 600-1520 or visit https://[place].gov[place]senior[place]linkage-line/        Inquire about  Benefits:  [email or web address]     Phone: [phone]    [facility] Public Health:  [phone]     [facility] Front Door (regarding inquiry into PCA services)  [phone]

## 2024-12-27 NOTE — PLAN OF CARE
Summary: CAP, ETOH, macular degeneration    DATE & TIME: 12/26/24, 1930 - 0730  Cognitive Concerns/ Orientation : A&O x 4 , forgetful  BEHAVIOR & AGGRESSION TOOL COLOR: Green.   CIWA SCORE: 0  ABNL VS/O2: VSS. Was given O2 1 LPM overnight to keep O2 saturation over 92%. JULES   MOBILITY: Assist x 1, gait belt and walker to chair and BSC  PAIN MANAGMENT: Denied  DIET: Regular.  BOWEL/BLADDER: Incontinent at times. Had  approximately 4 BMs x 4  this shift  ABNL LAB/BG: AM labs pending  DRAIN/DEVICES: New R PIV SL  TELEMETRY RHYTHM: NA  SKIN: Scattered bruising. Small scabbing lesion on right outer nostril.  +2 edema to BLE.  Dry flaky skin.  TESTS/PROCEDURES: NA  D/C DATE: Pending, CC following regarding plan  OTHER IMPORTANT INFO: Family does not want any aggressive treatments or procedures. Droplet precaution maintained

## 2024-12-27 NOTE — PROGRESS NOTES
[facility]    Medicine Progress Note - Hospitalist Service    Date of Admission:  12/21/2024    Assessment & Plan   Janet Cope is a 89 year old female with past medical history significant for daily alcohol use, diabetes mellitus type 2 who presents with cough, shortness of breath, headache, poor intake. Admitted on 12/21/2024.      Influenza A  Suspected super-imposed community acquired pneumonia   Acute hypoxic respiratory failure   # Volume overload  *Presents with productive cough, shortness of breath, headache, poor appetite. Symptom onset 12/14.  *Influenza A PCR positive 12/21.   *CXR with patchy bilateral pulmonary infiltrates, greatest in mid and lower lungs, suspicious for pneumonitis   *Oxygen saturation 86% on room air.   *Afebrile. WBC and lactic acid normal. Procalcitonin 1.32.  - Oseltamivir  completed 5 day course  -Completed Ceftriaxone  azithromycin PO.  - MRSA swab negative and discontinued ancomycin on 12/22/24  - Sputum culture  pending  - Oxygen PRN, wean as tolerated   - - PT consulted   -Wbc 13.4 on 12/22/24 down to 11.3 on 12/23/24  -Chest xray shows increased pulmonary opacities likely fluid and patient was given 1 dose of Lasix on 12/22/2024 and lasix second dose on 12/23/24  -Cough suppressants changed to liquid form and scheduled from prn on 12/23/24  -On 1 liter oxygen     # Acute metabolic encephalopathy Resolved  Likely due to underlying influenza  -Delirium precautions:  Up during the day with lights on  Lights off at night, avoid interruptions during the night as much as possible  Family visits  Encourage wearing glasses  Reorientation  Avoid opioids, benzodiazepines, anticholinergics.    Continue to ensure proper nutrition, fluid and electrolyte balance. Monitor for infections, hypoxia, metabolic derangements, or other causes of delirium.     # Insomnia  # Anxiety   -Will start low dose Ramlteon  -Hydroxyzine as needed once daily 10 mg twice  daily           Diabetes mellitus, type 2, diet-controlled   HgbA1c 5.9% 24. Glucose adequately controlled on BMP.   - HgbA1c  - Medium sliding scale insulin. Consider discontinuation if HgbA1c well-controlled on diet alone and blood sugars not requiring treatment     Alcohol use disorder  *Family reports patient drinks 1 box of wine every 2 days, patient reports 3 glasses daily. Daily use since   2 years ago  *No interest in quitting or cutting back   - CIWA with lorazepam  - MVI, thiamine  Can discontinue CIWA      Cardiac murmur  Patient denies previous knowledge of murmur.   -Family declined Echo            TRINI  on chronic kidney disease, stage 3  Baseline creatinine 0.9-1.1. Creatinine 1.04 on admission.   - Currently around baseline  - Avoid nephrotoxins  - Monitor BMP    -Gentle hydration completed  -Creatitine improved from 1.5 to 1.29   Hypertensionlosartan  -Hold losartan due to TRINI     Depression  Anxiety  - Continue prior to admission escitalopram, buspirone when dose confirmed by pharmacy      GERD  - Continue prior to admission PPI        Hyperlipidemia  - Continue prior to admission atorvastatin      # Hyponatremia  Mild improving    # Ear Blister  Not painful  Will continue to monitor  -Infectious disease consulted patient's lesion is already crusted and as per ID not consistent with zoster ophthalmicus and no further treatment is advised.  -Patient received 1 dose of Valtrex    # Constipation  Resolved with Prune juice          Diet: Combination Diet Regular Diet Adult  Snacks/Supplements Adult: Glucerna; With Meals    DVT Prophylaxis: Enoxaparin (Lovenox) SQ  Arroyo Catheter: Not present  Lines: None     Cardiac Monitoring: None  Code Status: No CPR- Do NOT Intubate      Clinically Significant Risk Factors                   # Hypertension: Noted on problem list            # Obesity: Estimated body mass index is 32.39 kg/m  as calculated from the following:    Height as of this  "encounter: 1.626 m (5' 4\").    Weight as of this encounter: 85.6 kg (188 lb 11.4 oz).             Social Drivers of Health    Tobacco Use: Medium Risk (12/21/2024)    Patient History     Smoking Tobacco Use: Former     Smokeless Tobacco Use: Never   Physical Activity: Not on File (8/26/2019)    Received from [facility]    Physical Activity     Physical Activity: 0   Stress: Not on File (8/26/2019)    Received from [facility]    Stress     Stress: 0    [facility]    Social Connections          Disposition Plan     Medically Ready for Discharge: Anticipated Tomorrow             Aquilino Crandall MD  Hospitalist Service  [facility]  Securely message with [employer]oft (more info)  Text page via [email or web address] Paging/Directory   ______________________________________________________________________    Interval History     No acute overnight events.  Patient slept well.  Having loose stools which she describes as gravy.  She said this is chronic for her    Denies any chest pain or shortness of breathe     Kidney function has improved   Patient has some mild leukocytosis       .    Physical Exam   Vital Signs: Temp: 98.9  F (37.2  C) Temp src: Axillary BP: 138/59 Pulse: 85   Resp: 17 SpO2: 91 % O2 Device: None (Room air) Oxygen Delivery: 1 LPM  Weight: 188 lbs 11.42 oz    Physical Exam  Cardiovascular:      Heart sounds: Murmur heard.   Pulmonary:      Effort: Pulmonary effort is normal. No respiratory distress.      Comments: Coarse breath sounds  Abdominal:      General: There is no distension.      Palpations: Abdomen is soft.      Tenderness: There is no abdominal tenderness.   Neurological:      Mental Status: She is alert.          Medical Decision Making       Data     I have personally reviewed the following data over the past 24 hrs:    11.3 (H)  \   8.8 (L)   / 459 (H)     141 105 14.2 /  89 "   3.6 23 1.29 (H) \       Imaging results reviewed over the past 24 hrs:   No results found for this or any previous visit (from the past 24 hours).

## 2024-12-27 NOTE — PLAN OF CARE
Goal Outcome Evaluation:  Cognitive Concerns/ Orientation : A&Ox4; forgetful at times  BEHAVIOR & AGGRESSION TOOL COLOR: Green.   CIWA SCORE: 0  ABNL VS/O2: VSS. On room air  MOBILITY: Assist x 1, gait belt and walker to chair and BSC. Up in the recliner this am  PAIN MANAGMENT: tylenol given for back pain  DIET: Regular-fair.  BOWEL/BLADDER: Incontinent at times.   ABNL LAB/BG: Cr 1.29  DRAIN/DEVICES:  R PIV SL  TELEMETRY RHYTHM: NA  SKIN: Scattered bruising. Small scabbing lesion on right outer nostril.  +2 edema to BLE.  Dry flaky skin.  TESTS/PROCEDURES: NA  D/C DATE: possible discharge to home tomorrow pending improved kidney function  OTHER IMPORTANT INFO:diminished lung sounds, productive cough-robitussin given. Updated Kristi and pts daughter with pts permission

## 2024-12-28 VITALS
HEART RATE: 83 BPM | OXYGEN SATURATION: 93 % | WEIGHT: 188.71 LBS | TEMPERATURE: 98.5 F | SYSTOLIC BLOOD PRESSURE: 138 MMHG | DIASTOLIC BLOOD PRESSURE: 59 MMHG | RESPIRATION RATE: 16 BRPM | HEIGHT: 64 IN | BODY MASS INDEX: 32.22 KG/M2

## 2024-12-28 LAB
ANION GAP SERPL CALCULATED.3IONS-SCNC: 14 MMOL/L (ref 7–15)
BUN SERPL-MCNC: 13.1 MG/DL (ref 8–23)
CALCIUM SERPL-MCNC: 9 MG/DL (ref 8.8–10.4)
CHLORIDE SERPL-SCNC: 102 MMOL/L (ref 98–107)
CREAT SERPL-MCNC: 1.14 MG/DL (ref 0.51–0.95)
CREAT SERPL-MCNC: 1.14 MG/DL (ref 0.51–0.95)
EGFRCR SERPLBLD CKD-EPI 2021: 46 ML/MIN/1.73M2
EGFRCR SERPLBLD CKD-EPI 2021: 46 ML/MIN/1.73M2
ERYTHROCYTE [DISTWIDTH] IN BLOOD BY AUTOMATED COUNT: 18.4 % (ref 10–15)
GLUCOSE BLDC GLUCOMTR-MCNC: 110 MG/DL (ref 70–99)
GLUCOSE SERPL-MCNC: 105 MG/DL (ref 70–99)
HCO3 SERPL-SCNC: 21 MMOL/L (ref 22–29)
HCT VFR BLD AUTO: 29.1 % (ref 35–47)
HGB BLD-MCNC: 9.1 G/DL (ref 11.7–15.7)
MCH RBC QN AUTO: 25.8 PG (ref 26.5–33)
MCHC RBC AUTO-ENTMCNC: 31.3 G/DL (ref 31.5–36.5)
MCV RBC AUTO: 82 FL (ref 78–100)
NT-PROBNP SERPL-MCNC: 3549 PG/ML (ref 0–1800)
PLATELET # BLD AUTO: 460 10E3/UL (ref 150–450)
POTASSIUM SERPL-SCNC: 3.5 MMOL/L (ref 3.4–5.3)
PROCALCITONIN SERPL IA-MCNC: 0.19 NG/ML
RBC # BLD AUTO: 3.53 10E6/UL (ref 3.8–5.2)
SODIUM SERPL-SCNC: 137 MMOL/L (ref 135–145)
WBC # BLD AUTO: 11.8 10E3/UL (ref 4–11)

## 2024-12-28 PROCEDURE — 84145 PROCALCITONIN (PCT): CPT | Performed by: STUDENT IN AN ORGANIZED HEALTH CARE EDUCATION/TRAINING PROGRAM

## 2024-12-28 PROCEDURE — 82565 ASSAY OF CREATININE: CPT | Performed by: EMERGENCY MEDICINE

## 2024-12-28 PROCEDURE — 36415 COLL VENOUS BLD VENIPUNCTURE: CPT | Performed by: STUDENT IN AN ORGANIZED HEALTH CARE EDUCATION/TRAINING PROGRAM

## 2024-12-28 PROCEDURE — 250N000011 HC RX IP 250 OP 636: Performed by: STUDENT IN AN ORGANIZED HEALTH CARE EDUCATION/TRAINING PROGRAM

## 2024-12-28 PROCEDURE — 99239 HOSP IP/OBS DSCHRG MGMT >30: CPT | Performed by: STUDENT IN AN ORGANIZED HEALTH CARE EDUCATION/TRAINING PROGRAM

## 2024-12-28 PROCEDURE — 85014 HEMATOCRIT: CPT | Performed by: STUDENT IN AN ORGANIZED HEALTH CARE EDUCATION/TRAINING PROGRAM

## 2024-12-28 PROCEDURE — 250N000013 HC RX MED GY IP 250 OP 250 PS 637: Performed by: INTERNAL MEDICINE

## 2024-12-28 PROCEDURE — 83880 ASSAY OF NATRIURETIC PEPTIDE: CPT | Performed by: STUDENT IN AN ORGANIZED HEALTH CARE EDUCATION/TRAINING PROGRAM

## 2024-12-28 PROCEDURE — 250N000013 HC RX MED GY IP 250 OP 250 PS 637: Performed by: STUDENT IN AN ORGANIZED HEALTH CARE EDUCATION/TRAINING PROGRAM

## 2024-12-28 PROCEDURE — 80048 BASIC METABOLIC PNL TOTAL CA: CPT | Performed by: STUDENT IN AN ORGANIZED HEALTH CARE EDUCATION/TRAINING PROGRAM

## 2024-12-28 PROCEDURE — 80048 BASIC METABOLIC PNL TOTAL CA: CPT | Performed by: EMERGENCY MEDICINE

## 2024-12-28 RX ORDER — GUAIFENESIN/DEXTROMETHORPHAN 100-10MG/5
5 SYRUP ORAL 4 TIMES DAILY PRN
Qty: 118 ML | Refills: 0 | Status: SHIPPED | OUTPATIENT
Start: 2024-12-28 | End: 2025-01-06

## 2024-12-28 RX ORDER — RAMELTEON 8 MG/1
8 TABLET ORAL
Qty: 30 TABLET | Refills: 0 | Status: SHIPPED | OUTPATIENT
Start: 2024-12-28 | End: 2025-01-06

## 2024-12-28 RX ORDER — LOSARTAN POTASSIUM 100 MG/1
100 TABLET ORAL DAILY
Status: ON HOLD
Start: 2024-12-28 | End: 2025-01-08

## 2024-12-28 RX ADMIN — THIAMINE HYDROCHLORIDE 100 MG: 100 INJECTION, SOLUTION INTRAMUSCULAR; INTRAVENOUS at 08:43

## 2024-12-28 RX ADMIN — PANTOPRAZOLE SODIUM 40 MG: 40 TABLET, DELAYED RELEASE ORAL at 08:43

## 2024-12-28 RX ADMIN — GUAIFENESIN SYRUP AND DEXTROMETHORPHAN 10 ML: 100; 10 SYRUP ORAL at 05:57

## 2024-12-28 RX ADMIN — GUAIFENESIN SYRUP AND DEXTROMETHORPHAN 10 ML: 100; 10 SYRUP ORAL at 10:25

## 2024-12-28 RX ADMIN — ACETAMINOPHEN 650 MG: 325 TABLET, FILM COATED ORAL at 05:57

## 2024-12-28 RX ADMIN — ASPIRIN 81 MG: 81 TABLET, COATED ORAL at 08:43

## 2024-12-28 RX ADMIN — ESCITALOPRAM OXALATE 10 MG: 10 TABLET ORAL at 08:43

## 2024-12-28 RX ADMIN — ATORVASTATIN CALCIUM 20 MG: 20 TABLET, FILM COATED ORAL at 08:43

## 2024-12-28 RX ADMIN — GUAIFENESIN SYRUP AND DEXTROMETHORPHAN 10 ML: 100; 10 SYRUP ORAL at 01:48

## 2024-12-28 RX ADMIN — BUSPIRONE HYDROCHLORIDE 7.5 MG: 5 TABLET ORAL at 08:43

## 2024-12-28 ASSESSMENT — ACTIVITIES OF DAILY LIVING (ADL)
ADLS_ACUITY_SCORE: 71
ADLS_ACUITY_SCORE: 73
ADLS_ACUITY_SCORE: 71

## 2024-12-28 NOTE — PLAN OF CARE
"Goal Outcome Evaluation:  Discharge    Patient discharged to home with family, granddaughter insisted on picking her up at 12.    Care plan note  Discharge instructions reviewed with pt and family. MEDs filled and given to family. IV removed.    Listed belongings gathered and given to patient (including from security/pharmacy). Yes  Care Plan and Patient education resolved: Yes  Prescriptions if needed, hard copies sent with patient  Yes  Medication Bin checked and emptied on discharge Yes  SW/care coordinator/charge RN aware of discharge: Yes    Updated Dr Crandall several hours ago that family was taking her home at 12pm. Asked granddaughter if she wanted to wait and see the MD, she stated \"no, what will he tell me that I already don't know\".                            "

## 2024-12-28 NOTE — PLAN OF CARE
DATE & TIME: 12/27/24 3pm-11pm    Cognitive Concerns/ Orientation : A&Ox4, legally blind   BEHAVIOR & AGGRESSION TOOL COLOR: green  ABNL VS/O2: VSS/Room air  MOBILITY: SBA/GB/Walker  PAIN MANAGMENT: prn tylenol  DIET: regular  BOWEL/BLADDER: continent uses bedside commode  ABNL LAB/BG: Creat 1.29, Hgb 8.8  DRAIN/DEVICES: R PIV  D/C DATE: tomorrow to home with family

## 2024-12-28 NOTE — PROGRESS NOTES
Summary: CAP, ETOH, macular degeneration    Orientation: A/O x4, forgetful    Vitals/Tele: VSS on RA    IV Access/drains: MO PIV SL     Diet: Regular    Mobility: SBA, up to BSC    GI/: Continent of B/B    Wound/Skin: Scattered bruising/scabs, R nostril scab     Consults:    Discharge Plan: Pending today    See Flow sheets for assessment

## 2024-12-28 NOTE — CONSULTS
Care Management Discharge Note    Discharge Date: 12/28/2024       Discharge Disposition: home with family to assist    Discharge Services:  [facility]care through [facility] RN/PT/OT/SW   Message left with Jameson 12/28 with orders [facility] does have access to EPIC  Discharge DME:  n/a    Discharge Transportation: family or friend will provide    Private pay costs discussed:  per previous consult resources given    Does the patient's insurance plan have a 3 day qualifying hospital stay waiver?  No    PAS Confirmation Code:  n/a  Patient/family educated on Medicare website which has current facility and service quality ratings:  no    Education Provided on the Discharge Plan:  no  Persons Notified of Discharge Plans: discharging provider, charge RN  Patient/Family in Agreement with the Plan: yes    Handoff Referral Completed: Yes, LILI PCP: Internal handoff referral completed    Additional Information:  Writer did not get a chance to meet with patient/family. Family was asking to discharge to home. Per review of consult 12/27 patient to discharge with [facility] RN/PT/OT/SW and resources were given to the family for ongoing needs.  No further needs identified.          Penny Garduno RN, BSN, ACM   Care Transitions Specialist  [facility]  Care Transitions Specialist  [id number] [place]. [address]  [email or web address]  Office: [phone] Fax: [phone]  [facility]

## 2024-12-28 NOTE — PLAN OF CARE
Physical Therapy Discharge Summary    Reason for therapy discharge:    Discharged to home with home therapy.    Progress towards therapy goal(s). See goals on Care Plan in [facility] electronic health record for goal details.  Goals partially met.  Barriers to achieving goals:   discharge from facility.    Therapy recommendation(s):    Continued therapy is recommended.  Rationale/Recommendations:   .  Continue home exercise program. : continue to recommend TCU however family would like to take pt home. Recommend 24/7 assist w/ mobility and ADLs using FWW, HHPT/OT  PT Brief overview of current status: min A FWW; Goals of therapy will be to address safe mobility and make recommendations for discharge to next level of care. Patient and RN will continue to follow all falls risk precautions as documented by RN staff while hospitalized        Recommendation above provided by last treating therapist.

## 2024-12-29 LAB — VZV DNA SPEC QL NAA+PROBE: NOT DETECTED

## 2024-12-29 NOTE — DISCHARGE SUMMARY
"[facility]  Hospitalist Discharge Summary      Date of Admission:  12/21/2024  Date of Discharge:  12/28/2024 12:12 PM  Discharging Provider: Aquilino Crandall MD  Discharge Service: Hospitalist Service    Discharge Diagnoses   Influenza A  Suspected super-imposed community acquired pneumonia   Acute hypoxic respiratory failure   Volume overload  TRINI    Clinically Significant Risk Factors     # Obesity: Estimated body mass index is 32.39 kg/m  as calculated from the following:    Height as of this encounter: 1.626 m (5' 4\").    Weight as of this encounter: 85.6 kg (188 lb 11.4 oz).       Follow-ups Needed After Discharge   Follow-up Appointments       Hospital Follow-up with Existing Primary Care Provider (PCP)      Please see details below         Schedule Primary Care visit within: 7 Days               Unresulted Labs Ordered in the Past 30 Days of this Admission       Date and Time Order Name Status Description    12/26/2024 10:48 AM Varicella Zoster Virus by PCR Blood Fluid or Tissue In process           Discharge Disposition   Discharged to home  Condition at discharge: Stable    Hospital Course   Janet Cope is a 89 year old female with past medical history significant for daily alcohol use, diabetes mellitus type 2 who presents with cough, shortness of breath, headache, poor intake. Admitted on 12/21/2024.      Influenza A  Suspected super-imposed community acquired pneumonia   Acute hypoxic respiratory failure   # Volume overload  # Sepsis -Resolved  -  *Presents with productive cough, shortness of breath,   headache, poor appetite. Symptom onset 12/14.  *Influenza A PCR positive 12/21.   *CXR with patchy bilateral pulmonary infiltrates, greatest in mid and lower lungs, suspicious for pneumonitis   *Oxygen saturation 86% on room air.   *Afebrile. WBC and lactic acid normal. Procalcitonin 1.32.  - Oseltamivir  completed 5 day course  -Completed Ceftriaxone  " azithromycin PO.  - MRSA swab negative and discontinued vancomycin on 12/22/24  - Sputum culture showed contamination  - Patient was weaned off to room air  - Patient received IV fluids on admission and developed mild volume overload and was given 2 days of Lasix and achieved near euvolemia  -Patient was seen by PT and recommended TCU versus home with home therapies  -Patient and family wanted patient to be discharged home and she will have close supervision  -Home PT, OT, social work and RN ordered.  -Patient had mild leukocytosis on the day of discharge.  Patient denied any respiratory symptoms or on any UTI symptoms.  She she said she feels really good and she wants to be discharged home  -Recommend RN check labs including BMP and CBC which are to be closely followed by primary care provider  -Patient asked to come back to the ER if she has worsening symptoms including fever, chills, shortness of breath or any concerning symptoms.  This was also discussed with patient's daughter Moraima    -Patient's family requested palliative care referral and palliative care consult placed  -Patient also has mild thrombocytosis likely reactive  -Procalcitonin normalized on discharge       # Acute metabolic encephalopathy Resolved  Likely due to underlying influenza  -Delirium precautions:  Up during the day with lights on  Lights off at night, avoid interruptions during the night as much as possible  Family visits  Encourage wearing glasses  Reorientation  Avoid opioids, benzodiazepines, anticholinergics.    Continue to ensure proper nutrition, fluid and electrolyte balance. Monitor for infections, hypoxia, metabolic derangements, or other causes of delirium.   - Patient's mentation has improved and she is currently back to her baseline     # Insomnia  # Anxiety   -Patient was having insomnia during hospitalization and was given low-dose ramelteon which had for sleep which helped her sleep  -Prescribed ramelteon as needed for  insomnia  -Prescribed 8mg    Addendum -called granddaughter Kristi and asked the patient to take 4 mg( half tablet and slowly increase to 8 mg and only to take it as needed for sleep     Diabetes mellitus, type 2, diet-controlled   HgbA1c 5.9% 24. Glucose adequately controlled on BMP.   - HgbA1c  - Medium sliding scale insulin. Consider discontinuation if HgbA1c well-controlled on diet alone and blood sugars not requiring treatment     Alcohol use disorder  *Family reports patient drinks 1 box of wine every 2 days, patient reports 3 glasses daily. Daily use since   2 years ago  *No interest in quitting or cutting back   - CIWA with lorazepam  - MVI, thiamine  Can discontinue CIWA  -      Cardiac murmur  Patient denies previous knowledge of murmur.   -Family declined Echo            TRINI  on chronic kidney disease, stage 3  Baseline creatinine 0.9-1.1. Creatinine 1.04 on admission.   - Currently around baseline  - Avoid nephrotoxins  - Monitor BMP    -Gentle hydration completed  -Creatitine improved from 1.5 to 1.29-1.14      Hypertension  -Hold losartan due to TRINI and can resume in 3 days  -Continue amlodipine     Depression  Anxiety  - Continue prior to admission escitalopram, buspirone when dose confirmed by pharmacy     # Legally blind  Noted     GERD  - Continue prior to admission PPI        Hyperlipidemia  - Continue prior to admission atorvastatin      # Hyponatremia  Mild improving    # Ear Blister  Not painful  Will continue to monitor  -Infectious disease consulted patient's lesion is already crusted and as per ID not consistent with zoster ophthalmicus and no further treatment is advised.  -Patient received 1 dose of Valtrex  -- Rash not in typical dermatomal pattern  - VZV PCR pending   Addendum PCR negative        # Constipation  Resolved with Prune juice    Consultations This Hospital Stay   PHARMACY TO DOSE VANCO  PHYSICAL THERAPY ADULT IP CONSULT  VASCULAR ACCESS ADULT IP  CONSULT  INFECTIOUS DISEASES IP CONSULT  CARE MANAGEMENT / SOCIAL WORK IP CONSULT    Code Status   Prior    Time Spent on this Encounter   I, Aquilino Crandall MD, personally saw the patient today and spent greater than 30 minutes discharging this patient.       Aquilino Crandall MD  [place] [address] [facility]  [address] [address]  Phone: [phone]  ______________________________________________________________________    Physical Exam   Vital Signs: Temp: 98.5  F (36.9  C) Temp src: Oral BP: 138/59 Pulse: 83   Resp: 16 SpO2: 93 % O2 Device: None (Room air)    Weight: 188 lbs 11.42 oz  Physical Exam  HENT:      Nose:      Comments: Nose blister crusted   Cardiovascular:      Rate and Rhythm: Normal rate and regular rhythm.      Heart sounds: Murmur heard.   Pulmonary:      Effort: Pulmonary effort is normal. No respiratory distress.      Breath sounds: Normal breath sounds.   Abdominal:      General: There is no distension.      Palpations: Abdomen is soft.      Tenderness: There is no abdominal tenderness.   Musculoskeletal:      Comments: Trace edema             Primary Care Physician   Frank Kovacs    Discharge Orders      Home Care Referral      Adult Palliative Care  Referral      Reason for your hospital stay    Flu, Pnuemonia     Activity    Your activity upon discharge: activity as tolerated     Diet    Follow this diet upon discharge: Current Diet:Orders Placed This Encounter      Snacks/Supplements Adult: Glucerna; With Meals      Combination Diet Regular Diet Adult     Hospital Follow-up with Existing Primary Care Provider (PCP)    Please see details below            Significant Results and Procedures   Most Recent 3 CBC's:  Recent Labs   Lab Test 12/28/24  0855 12/27/24  0748 12/26/24  0642   WBC 11.8* 11.3* 11.0   HGB 9.1* 8.8* 9.2*   MCV 82 81 80   * 459* 376     Most Recent 3 BMP's:  Recent Labs   Lab Test  12/28/24  0855 12/28/24  0735 12/27/24  0803 12/27/24  0748 12/26/24  0816 12/26/24  0642     --   --  141  --  137   POTASSIUM 3.5  --   --  3.6  --  3.6   CHLORIDE 102  --   --  105  --  102   CO2 21*  --   --  23  --  22   BUN 13.1  --   --  14.2  --  14.1   CR 1.14*  1.14*  --   --  1.29*  --  1.50*  1.50*   ANIONGAP 14  --   --  13  --  13   MEL 9.0  --   --  8.9  --  9.0   * 110* 89 88   < > 98    < > = values in this interval not displayed.       Discharge Medications   Discharge Medication List as of 12/28/2024 11:32 AM        START taking these medications    Details   guaiFENesin-dextromethorphan (ROBITUSSIN DM) 100-10 MG/5ML syrup Take 5 mLs by mouth 4 times daily as needed for cough., Disp-118 mL, R-0, E-Prescribe      ramelteon (ROZEREM) 8 MG tablet Take 1 tablet (8 mg) by mouth nightly as needed for sleep., Disp-30 tablet, R-0, E-Prescribe           CONTINUE these medications which have CHANGED    Details   losartan (COZAAR) 100 MG tablet Take 1 tablet (100 mg) by mouth daily., No Print OutHold till 1/2/24 and then resume           CONTINUE these medications which have NOT CHANGED    Details   acetaminophen (TYLENOL) 650 MG CR tablet Take 1 tablet (650 mg) by mouth nightly as needed for mild pain or fever, Historical      amLODIPine (NORVASC) 10 MG tablet TAKE 1 TABLET(10 MG) BY MOUTH DAILY IN THE EVENING, Disp-90 tablet, R-0, E-Prescribe      aspirin (ASA) 81 MG tablet Take 81 mg by mouth daily., Historical      atorvastatin (LIPITOR) 20 MG tablet TAKE 1 TABLET(20 MG) BY MOUTH DAILY, Disp-90 tablet, R-2, E-Prescribe      busPIRone (BUSPAR) 15 MG tablet Take 0.5 tablets (7.5 mg) by mouth 2 times daily For anxiety, Disp-30 tablet, R-11, E-Prescribe      escitalopram (LEXAPRO) 10 MG tablet TAKE 1 TABLET(10 MG) BY MOUTH DAILY, Disp-90 tablet, R-3, E-Prescribe      NEW MED One Touch Lancet device, Disp-1, R-0, Normal      omeprazole (PRILOSEC) 20 MG DR capsule TAKE 1 CAPSULE(20 MG) BY MOUTH  TWICE DAILY, Disp-180 capsule, R-1, E-Prescribe      order for DME One Touch Ultra test strips. Checking sugars dailyDisp-50 strip, R-11, Fax           STOP taking these medications       guaiFENesin-codeine (ROBITUSSIN AC) 100-10 MG/5ML solution Comments:   Reason for Stopping:             Allergies   Allergies   Allergen Reactions    Aromasin [Exemestane]      Muscle weakness, pain in legs    Lisinopril      Cough and Dizziness.

## 2024-12-30 ENCOUNTER — TELEPHONE (OUTPATIENT)
Dept: INTERNAL MEDICINE | Facility: CLINIC | Age: 89
End: 2024-12-30
Payer: MEDICARE

## 2024-12-30 ENCOUNTER — PATIENT OUTREACH (OUTPATIENT)
Dept: INTERNAL MEDICINE | Facility: CLINIC | Age: 89
End: 2024-12-30
Payer: MEDICARE

## 2024-12-30 ENCOUNTER — MYC MEDICAL ADVICE (OUTPATIENT)
Dept: INTERNAL MEDICINE | Facility: CLINIC | Age: 89
End: 2024-12-30
Payer: MEDICARE

## 2024-12-30 NOTE — TELEPHONE ENCOUNTER
Home Care is calling regarding an established patient with [facility].       Requesting orders from: Frank Kovacs  Provider is following patient: Yes  Is this a 60-day recertification request?  No    Orders Requested    Skilled Nursing  Request for initial certification (first set of orders)   Frequency:  1x/wk for 1 wks, then 2x/week for 2 weeks, and 1x/week for 3 weeks, 2 PRN visits  For disease management    Physical Therapy  Request for initial evaluation and treatment (one time)     Occupational Therapy  Request for initial evaluation and treatment (one time)     Social Work  Request for initial evaluation and treatment (one time)     Dr. Kovacs:  Discharging physician requesting CBC and BMP drawn per discharge paperwork, however did not place lab orders.   If PCP would like these done, please place future lab orders.   Otherwise, per chart review, pt has a hospital follow-up appt scheduled for 1/6/25 with you.     Verbal orders given for PT, Social Work.  Information was gathered for Skilled Nursing.  Provider review needed.  RN will contact Home Care with information after provider review.  Confirmed ok to leave a detailed message with call back.  Contact information confirmed and updated as needed.    Tarah Lynn, RN

## 2024-12-30 NOTE — TELEPHONE ENCOUNTER
Spoke with Melissa from home care and relayed approval of orders. She states pt has VV on 1/6/25 not in clinic.  Please advise if you would like home care to draw these labs and place orders.    Janis Goodwin RN

## 2024-12-30 NOTE — TELEPHONE ENCOUNTER
Okay for home care orders as requested.  Patient has follow-up appointment with me 1/6/2025 and labs will be drawn at that time in clinic after patient sees me

## 2025-01-03 ENCOUNTER — MEDICAL CORRESPONDENCE (OUTPATIENT)
Dept: HEALTH INFORMATION MANAGEMENT | Facility: CLINIC | Age: OVER 89
End: 2025-01-03
Payer: MEDICARE

## 2025-01-06 ENCOUNTER — HOSPITAL ENCOUNTER (INPATIENT)
Facility: CLINIC | Age: OVER 89
LOS: 2 days | Discharge: HOME | End: 2025-01-08
Attending: EMERGENCY MEDICINE | Admitting: STUDENT IN AN ORGANIZED HEALTH CARE EDUCATION/TRAINING PROGRAM
Payer: MEDICARE

## 2025-01-06 ENCOUNTER — OFFICE VISIT (OUTPATIENT)
Dept: INTERNAL MEDICINE | Facility: CLINIC | Age: OVER 89
End: 2025-01-06
Payer: MEDICARE

## 2025-01-06 ENCOUNTER — TELEPHONE (OUTPATIENT)
Dept: INTERNAL MEDICINE | Facility: CLINIC | Age: OVER 89
End: 2025-01-06

## 2025-01-06 ENCOUNTER — ANCILLARY PROCEDURE (OUTPATIENT)
Dept: GENERAL RADIOLOGY | Facility: CLINIC | Age: OVER 89
End: 2025-01-06
Attending: INTERNAL MEDICINE
Payer: MEDICARE

## 2025-01-06 VITALS
OXYGEN SATURATION: 97 % | SYSTOLIC BLOOD PRESSURE: 123 MMHG | TEMPERATURE: 97.1 F | DIASTOLIC BLOOD PRESSURE: 69 MMHG | BODY MASS INDEX: 31.92 KG/M2 | HEART RATE: 77 BPM | HEIGHT: 64 IN | WEIGHT: 187 LBS

## 2025-01-06 DIAGNOSIS — Z87.09 HX OF INFLUENZA: ICD-10-CM

## 2025-01-06 DIAGNOSIS — I50.9 ACUTE CONGESTIVE HEART FAILURE, UNSPECIFIED HEART FAILURE TYPE (H): ICD-10-CM

## 2025-01-06 DIAGNOSIS — F41.9 ANXIETY: ICD-10-CM

## 2025-01-06 DIAGNOSIS — D64.9 ANEMIA, UNSPECIFIED TYPE: ICD-10-CM

## 2025-01-06 DIAGNOSIS — N18.31 STAGE 3A CHRONIC KIDNEY DISEASE (H): Primary | ICD-10-CM

## 2025-01-06 DIAGNOSIS — K62.5 RECTAL BLEEDING: ICD-10-CM

## 2025-01-06 DIAGNOSIS — Z51.5 HOSPICE CARE PATIENT: Primary | ICD-10-CM

## 2025-01-06 DIAGNOSIS — R60.9 EDEMA, UNSPECIFIED TYPE: ICD-10-CM

## 2025-01-06 DIAGNOSIS — E11.29 TYPE 2 DIABETES MELLITUS WITH DIABETIC MICROALBUMINURIA, WITHOUT LONG-TERM CURRENT USE OF INSULIN (H): ICD-10-CM

## 2025-01-06 DIAGNOSIS — R80.9 TYPE 2 DIABETES MELLITUS WITH DIABETIC MICROALBUMINURIA, WITHOUT LONG-TERM CURRENT USE OF INSULIN (H): ICD-10-CM

## 2025-01-06 DIAGNOSIS — D64.9 ACUTE ON CHRONIC ANEMIA: ICD-10-CM

## 2025-01-06 DIAGNOSIS — N18.31 STAGE 3A CHRONIC KIDNEY DISEASE (H): ICD-10-CM

## 2025-01-06 LAB
ABO + RH BLD: NORMAL
ALBUMIN SERPL BCG-MCNC: 3.5 G/DL (ref 3.5–5.2)
ALP SERPL-CCNC: 69 U/L (ref 40–150)
ALT SERPL W P-5'-P-CCNC: 16 U/L (ref 0–50)
ANION GAP SERPL CALCULATED.3IONS-SCNC: 10 MMOL/L (ref 7–15)
ANION GAP SERPL CALCULATED.3IONS-SCNC: 12 MMOL/L (ref 7–15)
AST SERPL W P-5'-P-CCNC: 32 U/L (ref 0–45)
BASOPHILS # BLD AUTO: 0 10E3/UL (ref 0–0.2)
BASOPHILS NFR BLD AUTO: 1 %
BILIRUB SERPL-MCNC: 0.4 MG/DL
BLD GP AB SCN SERPL QL: NEGATIVE
BLD PROD TYP BPU: NORMAL
BLOOD COMPONENT TYPE: NORMAL
BUN SERPL-MCNC: 12 MG/DL (ref 8–23)
BUN SERPL-MCNC: 13 MG/DL (ref 8–23)
CALCIUM SERPL-MCNC: 8.7 MG/DL (ref 8.8–10.4)
CALCIUM SERPL-MCNC: 9.3 MG/DL (ref 8.8–10.4)
CHLORIDE SERPL-SCNC: 104 MMOL/L (ref 98–107)
CHLORIDE SERPL-SCNC: 106 MMOL/L (ref 98–107)
CODING SYSTEM: NORMAL
CREAT SERPL-MCNC: 1.12 MG/DL (ref 0.51–0.95)
CREAT SERPL-MCNC: 1.12 MG/DL (ref 0.51–0.95)
CROSSMATCH: NORMAL
EGFRCR SERPLBLD CKD-EPI 2021: 46 ML/MIN/1.73M2
EGFRCR SERPLBLD CKD-EPI 2021: 46 ML/MIN/1.73M2
EOSINOPHIL # BLD AUTO: 0.1 10E3/UL (ref 0–0.7)
EOSINOPHIL NFR BLD AUTO: 1 %
ERYTHROCYTE [DISTWIDTH] IN BLOOD BY AUTOMATED COUNT: 18.8 % (ref 10–15)
ERYTHROCYTE [DISTWIDTH] IN BLOOD BY AUTOMATED COUNT: 19 % (ref 10–15)
GLUCOSE SERPL-MCNC: 123 MG/DL (ref 70–99)
GLUCOSE SERPL-MCNC: 99 MG/DL (ref 70–99)
HCO3 SERPL-SCNC: 22 MMOL/L (ref 22–29)
HCO3 SERPL-SCNC: 22 MMOL/L (ref 22–29)
HCT VFR BLD AUTO: 23.8 % (ref 35–47)
HCT VFR BLD AUTO: 23.8 % (ref 35–47)
HGB BLD-MCNC: 7.2 G/DL (ref 11.7–15.7)
HGB BLD-MCNC: 7.5 G/DL (ref 11.7–15.7)
IMM GRANULOCYTES # BLD: 0 10E3/UL
IMM GRANULOCYTES NFR BLD: 0 %
ISSUE DATE AND TIME: NORMAL
LYMPHOCYTES # BLD AUTO: 1.2 10E3/UL (ref 0.8–5.3)
LYMPHOCYTES NFR BLD AUTO: 14 %
MCH RBC QN AUTO: 25.5 PG (ref 26.5–33)
MCH RBC QN AUTO: 25.7 PG (ref 26.5–33)
MCHC RBC AUTO-ENTMCNC: 30.3 G/DL (ref 31.5–36.5)
MCHC RBC AUTO-ENTMCNC: 31.5 G/DL (ref 31.5–36.5)
MCV RBC AUTO: 82 FL (ref 78–100)
MCV RBC AUTO: 84 FL (ref 78–100)
MONOCYTES # BLD AUTO: 0.9 10E3/UL (ref 0–1.3)
MONOCYTES NFR BLD AUTO: 10 %
NEUTROPHILS # BLD AUTO: 6.5 10E3/UL (ref 1.6–8.3)
NEUTROPHILS NFR BLD AUTO: 74 %
NRBC # BLD AUTO: 0 10E3/UL
NRBC BLD AUTO-RTO: 0 /100
PLATELET # BLD AUTO: 436 10E3/UL (ref 150–450)
PLATELET # BLD AUTO: 461 10E3/UL (ref 150–450)
POTASSIUM SERPL-SCNC: 3.9 MMOL/L (ref 3.4–5.3)
POTASSIUM SERPL-SCNC: 4.8 MMOL/L (ref 3.4–5.3)
PROT SERPL-MCNC: 7.6 G/DL (ref 6.4–8.3)
RBC # BLD AUTO: 2.82 10E6/UL (ref 3.8–5.2)
RBC # BLD AUTO: 2.92 10E6/UL (ref 3.8–5.2)
SODIUM SERPL-SCNC: 136 MMOL/L (ref 135–145)
SODIUM SERPL-SCNC: 140 MMOL/L (ref 135–145)
SPECIMEN EXP DATE BLD: NORMAL
UNIT ABO/RH: NORMAL
UNIT NUMBER: NORMAL
UNIT STATUS: NORMAL
UNIT TYPE ISBT: 5100
WBC # BLD AUTO: 7.2 10E3/UL (ref 4–11)
WBC # BLD AUTO: 8.7 10E3/UL (ref 4–11)

## 2025-01-06 PROCEDURE — 86850 RBC ANTIBODY SCREEN: CPT | Performed by: EMERGENCY MEDICINE

## 2025-01-06 PROCEDURE — 86923 COMPATIBILITY TEST ELECTRIC: CPT | Performed by: EMERGENCY MEDICINE

## 2025-01-06 PROCEDURE — 82435 ASSAY OF BLOOD CHLORIDE: CPT | Performed by: EMERGENCY MEDICINE

## 2025-01-06 PROCEDURE — 99285 EMERGENCY DEPT VISIT HI MDM: CPT | Mod: 25

## 2025-01-06 PROCEDURE — 85025 COMPLETE CBC W/AUTO DIFF WBC: CPT | Performed by: INTERNAL MEDICINE

## 2025-01-06 PROCEDURE — 36415 COLL VENOUS BLD VENIPUNCTURE: CPT | Performed by: EMERGENCY MEDICINE

## 2025-01-06 PROCEDURE — 71046 X-RAY EXAM CHEST 2 VIEWS: CPT | Mod: TC | Performed by: RADIOLOGY

## 2025-01-06 PROCEDURE — HZ2ZZZZ DETOXIFICATION SERVICES FOR SUBSTANCE ABUSE TREATMENT: ICD-10-PCS | Performed by: STUDENT IN AN ORGANIZED HEALTH CARE EDUCATION/TRAINING PROGRAM

## 2025-01-06 PROCEDURE — 250N000011 HC RX IP 250 OP 636: Performed by: STUDENT IN AN ORGANIZED HEALTH CARE EDUCATION/TRAINING PROGRAM

## 2025-01-06 PROCEDURE — 86900 BLOOD TYPING SEROLOGIC ABO: CPT | Performed by: EMERGENCY MEDICINE

## 2025-01-06 PROCEDURE — 36430 TRANSFUSION BLD/BLD COMPNT: CPT

## 2025-01-06 PROCEDURE — 85027 COMPLETE CBC AUTOMATED: CPT | Performed by: EMERGENCY MEDICINE

## 2025-01-06 PROCEDURE — 250N000013 HC RX MED GY IP 250 OP 250 PS 637: Performed by: STUDENT IN AN ORGANIZED HEALTH CARE EDUCATION/TRAINING PROGRAM

## 2025-01-06 PROCEDURE — 36415 COLL VENOUS BLD VENIPUNCTURE: CPT | Performed by: INTERNAL MEDICINE

## 2025-01-06 PROCEDURE — P9016 RBC LEUKOCYTES REDUCED: HCPCS | Performed by: EMERGENCY MEDICINE

## 2025-01-06 PROCEDURE — 99214 OFFICE O/P EST MOD 30 MIN: CPT | Performed by: INTERNAL MEDICINE

## 2025-01-06 PROCEDURE — 80048 BASIC METABOLIC PNL TOTAL CA: CPT | Performed by: EMERGENCY MEDICINE

## 2025-01-06 PROCEDURE — 99222 1ST HOSP IP/OBS MODERATE 55: CPT | Mod: AI | Performed by: STUDENT IN AN ORGANIZED HEALTH CARE EDUCATION/TRAINING PROGRAM

## 2025-01-06 PROCEDURE — 120N000001 HC R&B MED SURG/OB

## 2025-01-06 RX ORDER — ACETAMINOPHEN 325 MG/1
650 TABLET ORAL
Status: DISCONTINUED | OUTPATIENT
Start: 2025-01-06 | End: 2025-01-08 | Stop reason: HOSPADM

## 2025-01-06 RX ORDER — FUROSEMIDE 20 MG/1
20 TABLET ORAL DAILY
Qty: 30 TABLET | Refills: 1 | Status: ON HOLD | OUTPATIENT
Start: 2025-01-06 | End: 2025-01-08

## 2025-01-06 RX ORDER — GUAIFENESIN/DEXTROMETHORPHAN 100-10MG/5
10 SYRUP ORAL 4 TIMES DAILY PRN
COMMUNITY
End: 2025-01-06

## 2025-01-06 RX ORDER — ESCITALOPRAM OXALATE 10 MG/1
10 TABLET ORAL DAILY
Status: DISCONTINUED | OUTPATIENT
Start: 2025-01-07 | End: 2025-01-08 | Stop reason: HOSPADM

## 2025-01-06 RX ORDER — AMOXICILLIN 250 MG
2 CAPSULE ORAL 2 TIMES DAILY PRN
Status: DISCONTINUED | OUTPATIENT
Start: 2025-01-06 | End: 2025-01-08 | Stop reason: HOSPADM

## 2025-01-06 RX ORDER — LIDOCAINE 40 MG/G
CREAM TOPICAL
Status: DISCONTINUED | OUTPATIENT
Start: 2025-01-06 | End: 2025-01-08 | Stop reason: HOSPADM

## 2025-01-06 RX ORDER — AMOXICILLIN 250 MG
1 CAPSULE ORAL 2 TIMES DAILY PRN
Status: DISCONTINUED | OUTPATIENT
Start: 2025-01-06 | End: 2025-01-08 | Stop reason: HOSPADM

## 2025-01-06 RX ORDER — ATORVASTATIN CALCIUM 20 MG/1
20 TABLET, FILM COATED ORAL DAILY
Status: DISCONTINUED | OUTPATIENT
Start: 2025-01-07 | End: 2025-01-08 | Stop reason: HOSPADM

## 2025-01-06 RX ORDER — LOSARTAN POTASSIUM 100 MG/1
100 TABLET ORAL DAILY
Status: DISCONTINUED | OUTPATIENT
Start: 2025-01-07 | End: 2025-01-08

## 2025-01-06 RX ORDER — FUROSEMIDE 20 MG/1
20 TABLET ORAL DAILY
Qty: 90 TABLET | OUTPATIENT
Start: 2025-01-06

## 2025-01-06 RX ORDER — AMLODIPINE BESYLATE 10 MG/1
10 TABLET ORAL EVERY EVENING
Status: DISCONTINUED | OUTPATIENT
Start: 2025-01-06 | End: 2025-01-08 | Stop reason: HOSPADM

## 2025-01-06 RX ORDER — FUROSEMIDE 20 MG/1
20 TABLET ORAL DAILY
Status: DISCONTINUED | OUTPATIENT
Start: 2025-01-07 | End: 2025-01-08

## 2025-01-06 RX ORDER — CALCIUM CARBONATE 500 MG/1
1000 TABLET, CHEWABLE ORAL 4 TIMES DAILY PRN
Status: DISCONTINUED | OUTPATIENT
Start: 2025-01-06 | End: 2025-01-08 | Stop reason: HOSPADM

## 2025-01-06 RX ORDER — PANTOPRAZOLE SODIUM 40 MG/1
40 TABLET, DELAYED RELEASE ORAL 2 TIMES DAILY
Status: DISCONTINUED | OUTPATIENT
Start: 2025-01-06 | End: 2025-01-08 | Stop reason: HOSPADM

## 2025-01-06 RX ORDER — FUROSEMIDE 10 MG/ML
20 INJECTION INTRAMUSCULAR; INTRAVENOUS ONCE
Status: COMPLETED | OUTPATIENT
Start: 2025-01-06 | End: 2025-01-06

## 2025-01-06 RX ORDER — GUAIFENESIN/DEXTROMETHORPHAN 100-10MG/5
10 SYRUP ORAL 4 TIMES DAILY PRN
Status: DISCONTINUED | OUTPATIENT
Start: 2025-01-06 | End: 2025-01-08 | Stop reason: HOSPADM

## 2025-01-06 RX ORDER — BUSPIRONE HYDROCHLORIDE 15 MG/1
15 TABLET ORAL 2 TIMES DAILY
Qty: 60 TABLET | Refills: 11 | Status: SHIPPED | OUTPATIENT
Start: 2025-01-06

## 2025-01-06 RX ADMIN — PANTOPRAZOLE SODIUM 40 MG: 40 TABLET, DELAYED RELEASE ORAL at 23:47

## 2025-01-06 RX ADMIN — BUSPIRONE HYDROCHLORIDE 15 MG: 10 TABLET ORAL at 23:47

## 2025-01-06 RX ADMIN — FUROSEMIDE 20 MG: 10 INJECTION, SOLUTION INTRAMUSCULAR; INTRAVENOUS at 21:49

## 2025-01-06 RX ADMIN — AMLODIPINE BESYLATE 10 MG: 10 TABLET ORAL at 23:47

## 2025-01-06 ASSESSMENT — PATIENT HEALTH QUESTIONNAIRE - PHQ9
10. IF YOU CHECKED OFF ANY PROBLEMS, HOW DIFFICULT HAVE THESE PROBLEMS MADE IT FOR YOU TO DO YOUR WORK, TAKE CARE OF THINGS AT HOME, OR GET ALONG WITH OTHER PEOPLE: NOT DIFFICULT AT ALL
SUM OF ALL RESPONSES TO PHQ QUESTIONS 1-9: 9
SUM OF ALL RESPONSES TO PHQ QUESTIONS 1-9: 9

## 2025-01-06 ASSESSMENT — ACTIVITIES OF DAILY LIVING (ADL)
ADLS_ACUITY_SCORE: 58

## 2025-01-06 ASSESSMENT — COLUMBIA-SUICIDE SEVERITY RATING SCALE - C-SSRS
6. HAVE YOU EVER DONE ANYTHING, STARTED TO DO ANYTHING, OR PREPARED TO DO ANYTHING TO END YOUR LIFE?: NO
2. HAVE YOU ACTUALLY HAD ANY THOUGHTS OF KILLING YOURSELF IN THE PAST MONTH?: NO
1. IN THE PAST MONTH, HAVE YOU WISHED YOU WERE DEAD OR WISHED YOU COULD GO TO SLEEP AND NOT WAKE UP?: NO

## 2025-01-06 NOTE — ED TRIAGE NOTES
Pt  was recently in the hospital for influenza A. Pt  is doing better but now has bleeding hemorrhoids.  had blood when she wipes, not so much when she defecates.      Triage Assessment (Adult)       Row Name 01/06/25 1507          Triage Assessment    Airway WDL WDL        Respiratory WDL    Respiratory WDL WDL        Skin Circulation/Temperature WDL    Skin Circulation/Temperature WDL WDL        Cardiac WDL    Cardiac WDL WDL        Peripheral/Neurovascular WDL    Peripheral Neurovascular WDL WDL        Cognitive/Neuro/Behavioral WDL    Cognitive/Neuro/Behavioral WDL WDL

## 2025-01-06 NOTE — ED PROVIDER NOTES
Emergency Department Note      History of Present Illness     Chief Complaint   Hemorrhoids and Rectal Bleeding    HPI   Janet Cope is a 89 year old female with a history of diverticulosis , type 2 diabetes mellitus and hemorrhoids who presents for evaluation of rectal bleeding. She has a history of bleeding hemorrhoids over past 4 years. However she reports that it worsened after she was discharged from hospital. She was hospitalized in the hospital for the Pneumonia and influenza  2 weeks ago. She went to her PCP earlier today and lab was suggestive of low Hb of 7.5 so was sent here for further evaluation of and management. She has been feeling lethargic over past couple days however denies any lightheadedness, shortness of breath or chest pain.     Independent Historian   Daughter as detailed above.    Review of External Notes   I reviewed the office visit note to Dr. Kovacs from earlier today.  I reviewed the outpatient labs from earlier today as well as from 9 days ago and during previous hospitalization.  Hemoglobin on outpatient labs today 7.5.  9 days ago hemoglobin was 9.1.  I reviewed the discharge summary from hospitalization from December 21, 2024 through December 28, 2024.  Patient was admitted with influenza A and suspected superimposed community-acquired pneumonia with acute hypoxic respiratory failure and volume overload as well as TRINI.      Past Medical History   Medical History and Problem List   Anxiety  Basal cell carcinoma  Stage 3 CKD  Depression  Diverticulosis of colon  Enthesopathy of hip region  Essential hypertension, benign  Generalized osteoarthritis  Macular degeneration  Osteoporosis  Other specific gastritis  Colonic polyps  Malignant neoplasm of breast   Postmenopausal bleeding  Type 2 diabetes   Hemorrhoids      Medications   Prilosec  Losartan  Robitussin  Lexapro  Buspar  Lipitor  Aspirin 81 mg  Amlodipine     Surgical History   Vaginal hysterectomy  Stress echo  Breast  lumpectomy bilateral  Breast radiation tx bilateral   Cataract extraction    Physical Exam     Patient Vitals for the past 24 hrs:   BP Temp Temp src Pulse Resp SpO2   01/06/25 2100 132/52 -- -- 88 -- 97 %   01/06/25 2045 139/54 -- -- 89 -- 98 %   01/06/25 2030 125/58 -- -- 87 -- 95 %   01/06/25 2015 (!) 142/57 -- -- 87 -- 97 %   01/06/25 2000 131/57 -- -- 86 -- 96 %   01/06/25 1945 (!) 142/75 -- -- 98 -- 97 %   01/06/25 1930 132/52 -- -- 88 -- 97 %   01/06/25 1900 132/81 -- -- 97 -- 95 %   01/06/25 1856 132/81 98.4  F (36.9  C) -- 97 16 95 %   01/06/25 1854 -- 98.1  F (36.7  C) Oral 97 16 95 %   01/06/25 1730 -- -- -- -- -- 94 %   01/06/25 1506 126/60 98.2  F (36.8  C) Oral 98 16 93 %     Physical Exam  General: Well-nourished, appears to be resting comfortably when I enter the room  Eyes: PERRL, conjunctivae pink no scleral icterus or conjunctival injection  ENT:  Moist mucus membranes, posterior oropharynx clear without erythema or exudates  Respiratory:  Lungs clear to auscultation bilaterally, no crackles/rubs/wheezes.  Good air movement  CV: Normal rate and rhythm, no murmurs/rubs/gallops  GI:  Abdomen soft and non-distended.  Normoactive BS.  No tenderness, guarding or rebound  Rectal: brief is smeared with bright red blood. patient does have various hemorrhoids but not appear thrombosed or actively bleeding.  No melena.  Skin: Warm, dry.  No rashes or petechiae  Musculoskeletal: No peripheral edema or calf tenderness  Neuro: Alert and oriented to person/place/time  Psychiatric: Normal affect      Diagnostics     Lab Results   Labs Ordered and Resulted from Time of ED Arrival to Time of ED Departure   BASIC METABOLIC PANEL - Abnormal       Result Value    Sodium 136      Potassium 3.9      Chloride 104      Carbon Dioxide (CO2) 22      Anion Gap 10      Urea Nitrogen 12.0      Creatinine 1.12 (*)     GFR Estimate 46 (*)     Calcium 8.7 (*)     Glucose 123 (*)    CBC WITH PLATELETS AND DIFFERENTIAL - Abnormal     WBC Count 8.7      RBC Count 2.82 (*)     Hemoglobin 7.2 (*)     Hematocrit 23.8 (*)     MCV 84      MCH 25.5 (*)     MCHC 30.3 (*)     RDW 19.0 (*)     Platelet Count 436      % Neutrophils 74      % Lymphocytes 14      % Monocytes 10      % Eosinophils 1      % Basophils 1      % Immature Granulocytes 0      NRBCs per 100 WBC 0      Absolute Neutrophils 6.5      Absolute Lymphocytes 1.2      Absolute Monocytes 0.9      Absolute Eosinophils 0.1      Absolute Basophils 0.0      Absolute Immature Granulocytes 0.0      Absolute NRBCs 0.0     TYPE AND SCREEN, ADULT    ABO/RH(D) O POS      Antibody Screen Negative      SPECIMEN EXPIRATION DATE [id number]     PREPARE RED BLOOD CELLS (UNIT)    Blood Component Type Red Blood Cells      Product Code N8868O96      Unit Status Transfused      Unit Number [id number]      CROSSMATCH Compatible      CODING SYSTEM [id number]      ISSUE DATE AND TIME [id number]      UNIT ABO/RH O+      UNIT TYPE ISBT 5100     PREPARE RED BLOOD CELLS (UNIT)   TRANSFUSE RED BLOOD CELLS (UNIT)   ABO/RH TYPE AND SCREEN       Imaging   No orders to display     Independent Interpretation   None    ED Course      Medications Administered   Medications   acetaminophen (TYLENOL) CR tablet 650 mg (has no administration in time range)   amLODIPine (NORVASC) tablet 10 mg (has no administration in time range)   busPIRone (BUSPAR) tablet 15 mg (has no administration in time range)   atorvastatin (LIPITOR) tablet 20 mg (has no administration in time range)   escitalopram (LEXAPRO) tablet 10 mg (has no administration in time range)   furosemide (LASIX) tablet 20 mg (has no administration in time range)   losartan (COZAAR) tablet 100 mg (has no administration in time range)   guaiFENesin-dextromethorphan (ROBITUSSIN DM) 100-10 MG/5ML syrup 10 mL (has no administration in time range)   pantoprazole (PROTONIX) EC tablet 40 mg (has no administration in time range)   lidocaine 1 % 0.1-1 mL (has no  administration in time range)   lidocaine (LMX4) cream (has no administration in time range)   sodium chloride (PF) 0.9% PF flush 3 mL (has no administration in time range)   sodium chloride (PF) 0.9% PF flush 3 mL (has no administration in time range)   senna-docusate (SENOKOT-S/PERICOLACE) 8.6-50 MG per tablet 1 tablet (has no administration in time range)     Or   senna-docusate (SENOKOT-S/PERICOLACE) 8.6-50 MG per tablet 2 tablet (has no administration in time range)   calcium carbonate (TUMS) chewable tablet 1,000 mg (has no administration in time range)   furosemide (LASIX) injection 20 mg (has no administration in time range)   ramelteon (ROZEREM) half-tab 4 mg (has no administration in time range)       Procedures   Procedures       ED Course   ED Course as of 01/06/25 2133   Mon Jan 06, 2025   1552 I obtained the history and examined the patient as above.    1602 I talked to Dr Kovacs regarding this patient.    1828 I consulted with Dr. Crandall who will admit the patient and in agreement with transfusion and colorectal/GI consultation for ongoing rectal bleeding.       Additional Documentation  None    Medical Decision Making / Diagnosis       MDM   Janet Cope is a 89 year old female who apparently has chronic rectal bleeding and comes today with worsening rectal bleeding and fatigue.  She had outpatient labs drawn which showed worsening of her chronic anemia.  The bleeding is red and I suspect is lower in nature.  She has not had a recent colonoscopy.  She always assumed this was secondary to her hemorrhoids.  On exam I do not see any actively bleeding hemorrhoids so it could be an internal hemorrhoid or it could be diverticular in nature.  However, the bleeding has increased in frequency and amount and she is now anemic with signs and symptoms of acute blood loss anemia.  Discussed with her the risk versus benefits of transfusion and she elected to go forward with transfusion as well as admission to  the hospital.  She may benefit from colorectal intervention for the ongoing bleeding or she may require intervention by gastroenterology.  Dr. Crandall of the hospital service is familiar with the patient from her previous admission and graciously agreed to admit her today.  There had been previous discussion during hospitalization about possible transition to hospice but the patient does agree for treatment and further workup and admission at this time rather than hospice enrollment.    Disposition   The patient was admitted to the hospital.     Diagnosis     ICD-10-CM    1. Rectal bleeding  K62.5       2. Acute on chronic anemia  D64.9            Discharge Medications   New Prescriptions    No medications on file         Scribe Disclosure:  Otoniel BAKER, am serving as a scribe at 3:50 PM on 1/6/2025 to document services personally performed by Lima Yu MD based on my observations and the provider's statements to me.        Lima Yu MD  01/06/25 9727

## 2025-01-06 NOTE — TELEPHONE ENCOUNTER
See 12/30 MyChart encounter.  Appt changed to in-person format per PCP advise.     Thank you,  Tarah Lynn RN

## 2025-01-06 NOTE — ED TRIAGE NOTES
Triage Assessment (Adult)       Row Name 01/06/25 1507          Triage Assessment    Airway WDL WDL        Respiratory WDL    Respiratory WDL WDL        Skin Circulation/Temperature WDL    Skin Circulation/Temperature WDL WDL        Cardiac WDL    Cardiac WDL WDL        Peripheral/Neurovascular WDL    Peripheral Neurovascular WDL WDL        Cognitive/Neuro/Behavioral WDL    Cognitive/Neuro/Behavioral WDL WDL

## 2025-01-06 NOTE — TELEPHONE ENCOUNTER
DATE/TIME OF CALL RECEIVED FROM LAB:  01/06/25 at 11:25 AM   LAB TEST:  Hemoglobin  LAB VALUE:  7.5  PROVIDER NOTIFIED?: Yes  PROVIDER NAME: Frank Kovacs MD  DATE/TIME LAB VALUE REPORTED TO PROVIDER: 1/6/25 @ 11:25 AM  MECHANISM OF PROVIDER NOTIFICATION:  Routed to provider in-basket as High Priority flag  Patient was seen in office today and evaluated by PCP, seen for hospital follow up. Was in [facility] from 12/21-12/28 for: Influenza A; Suspected super-imposed community acquired pneumonia; Acute hypoxic respiratory failure; Volume overload; TRINI  Last Hgb level on 12/28/24: 9.1    Routing to ordering provider to review and advise.      Yadi Rivera RN  Clinical Triage/Primary Care  [facility]

## 2025-01-06 NOTE — PATIENT INSTRUCTIONS
Increase  Buspirone to 15mg tab, 1 tab twice a day for anxiety   Start Furosemide 20mg tab, 1 tab daily in AM for leg swelling and breathing  Compression stockings. On AM and off later PM   as needed for leg swelling  Continue other medications  Labs today as ordered  Xray  chest today   POLST form signed   Continue OT/PT therapy with home care   Ask HC nurse to do blood draw in 7-10 days for  recheck kidney function with Furosemide  Send update in [facility] or call clinic in 2 weeks  with update how your leg swelling is doing  Prep H topically to hemorrhoids. If willing to have treated, then let me know and will refer to CT surgery for banding vs other

## 2025-01-06 NOTE — PROGRESS NOTES
"  {PROVIDER CHARTING PREFERENCE:438188}    Mariah Gonzales is a 89 year old, presenting for the following health issues:  Hospital F/U  Pt would like to increase anxiety medication     HPI     {MA/LPN/RN Pre-Provider Visit Orders- hCG/UA/Strep (Optional):529457}    Hospital Follow-up Visit:    Hospital/Nursing Home/IP Rehab Facility: [facility]  Date of Admission: 12/21/24  Date of Discharge: 12/2/8/24  Reason(s) for Admission: flu, pneumonia   Was the patient in the ICU or did the patient experience delirium during hospitalization?  No  Do you have any other stressors you would like to discuss with your provider? No    Problems taking medications regularly:  None  Medication changes since discharge: None  Problems adhering to non-medication therapy:  Does not take sleeping medication that is prescribed     Summary of hospitalization:  {:953821}  Diagnostic Tests/Treatments reviewed.  Follow up needed: {:098296:}  Other Healthcare Providers Involved in Patient s Care:         {those currently involved after discharge:757342::\"None\"}  Update since discharge: {:569149} {TIP  Include information from family/caregivers, SNF, Care Coordination :313969}        Plan of care communicated with {:410110}     {Reference  Coding guidelines- Moderate Complexity F2F/Video within 7 - 14 days of discharge [id number], High Complexity F2F/Video within 7 days [id number] or [id number] days [id number] :611565}      {additonal problems for provider to add (Optional):686432}  {additonal problems for provider to add (Optional):590592}    {ROS Picklists (Optional):352025}      Objective    /69   Pulse 77   Temp 97.1  F (36.2  C) (Temporal)   Ht 1.626 m (5' 4\")   Wt 84.8 kg (187 lb)   SpO2 97%   BMI 32.10 kg/m    Body mass index is 32.1 kg/m .  Physical Exam   {Exam List (Optional):912834}    {Diagnostic Test Results (Optional):870555}        Signed Electronically by: Frank Kovacs MD  {Email feedback " regarding this note to [email or web address]   :563363}   "Hospital  Hospitalist Discharge Summary       Date of Admission:  12/21/2024  Date of Discharge:  12/28/2024 12:12 PM  Discharging Provider: Aquilino Crandall MD  Discharge Service: Hospitalist Service        Discharge Diagnoses  Influenza A  Suspected super-imposed community acquired pneumonia   Acute hypoxic respiratory failure   Volume overload  TRINI        Clinically Significant Risk Factors     # Obesity: Estimated body mass index is 32.39 kg/m  as calculated from the following:    Height as of this encounter: 1.626 m (5' 4\").    Weight as of this encounter: 85.6 kg (188 lb 11.4 oz).             Follow-ups Needed After Discharge  Follow-up Appointments         Hospital Follow-up with Existing Primary Care Provider (PCP)       Please see details below          Schedule Primary Care visit within: 7 Days                    Unresulted Labs Ordered in the Past 30 Days of this Admission         Date and Time Order Name Status Description     12/26/2024 10:48 AM Varicella Zoster Virus by PCR Blood Fluid or Tissue In process                  Discharge Disposition  Discharged to home  Condition at discharge: Stable        Hospital Course  Janet Cope is a 89 year old female with past medical history significant for daily alcohol use, diabetes mellitus type 2 who presents with cough, shortness of breath, headache, poor intake. Admitted on 12/21/2024.      Influenza A  Suspected super-imposed community acquired pneumonia   Acute hypoxic respiratory failure   # Volume overload  # Sepsis -Resolved  -  *Presents with productive cough, shortness of breath,   headache, poor appetite. Symptom onset 12/14.  *Influenza A PCR positive 12/21.   *CXR with patchy bilateral pulmonary infiltrates, greatest in mid and lower lungs, suspicious for pneumonitis   *Oxygen saturation 86% on room air.   *Afebrile. WBC and lactic acid normal. Procalcitonin 1.32.  - Oseltamivir  completed 5 day course  -Completed Ceftriaxone  " azithromycin PO.  - MRSA swab negative and discontinued vancomycin on 12/22/24  - Sputum culture showed contamination  - Patient was weaned off to room air  - Patient received IV fluids on admission and developed mild volume overload and was given 2 days of Lasix and achieved near euvolemia  -Patient was seen by PT and recommended TCU versus home with home therapies  -Patient and family wanted patient to be discharged home and she will have close supervision  -Home PT, OT, social work and RN ordered.  -Patient had mild leukocytosis on the day of discharge.  Patient denied any respiratory symptoms or on any UTI symptoms.  She she said she feels really good and she wants to be discharged home  -Recommend RN check labs including BMP and CBC which are to be closely followed by primary care provider  -Patient asked to come back to the ER if she has worsening symptoms including fever, chills, shortness of breath or any concerning symptoms.  This was also discussed with patient's daughter Moraima     -Patient's family requested palliative care referral and palliative care consult placed  -Patient also has mild thrombocytosis likely reactive  -Procalcitonin normalized on discharge        # Acute metabolic encephalopathy Resolved  Likely due to underlying influenza  -Delirium precautions:  Up during the day with lights on  Lights off at night, avoid interruptions during the night as much as possible  Family visits  Encourage wearing glasses  Reorientation  Avoid opioids, benzodiazepines, anticholinergics.    Continue to ensure proper nutrition, fluid and electrolyte balance. Monitor for infections, hypoxia, metabolic derangements, or other causes of delirium.   - Patient's mentation has improved and she is currently back to her baseline      # Insomnia  # Anxiety   -Patient was having insomnia during hospitalization and was given low-dose ramelteon which had for sleep which helped her sleep  -Prescribed ramelteon as needed  for insomnia  -Prescribed 8mg     Addendum -called granddaughter Kristi and asked the patient to take 4 mg( half tablet and slowly increase to 8 mg and only to take it as needed for sleep     Diabetes mellitus, type 2, diet-controlled   HgbA1c 5.9% 24. Glucose adequately controlled on BMP.   - HgbA1c  - Medium sliding scale insulin. Consider discontinuation if HgbA1c well-controlled on diet alone and blood sugars not requiring treatment     Alcohol use disorder  *Family reports patient drinks 1 box of wine every 2 days, patient reports 3 glasses daily. Daily use since   2 years ago  *No interest in quitting or cutting back   - CIWA with lorazepam  - MVI, thiamine  Can discontinue CIWA  -        Cardiac murmur  Patient denies previous knowledge of murmur.   -Family declined Echo               TRINI  on chronic kidney disease, stage 3  Baseline creatinine 0.9-1.1. Creatinine 1.04 on admission.   - Currently around baseline  - Avoid nephrotoxins  - Monitor BMP    -Gentle hydration completed  -Creatitine improved from 1.5 to 1.29-1.14        Hypertension  -Hold losartan due to TRINI and can resume in 3 days  -Continue amlodipine     Depression  Anxiety  - Continue prior to admission escitalopram, buspirone when dose confirmed by pharmacy      # Legally blind  Noted     GERD  - Continue prior to admission PPI         Hyperlipidemia  - Continue prior to admission atorvastatin      # Hyponatremia  Mild improving     # Ear Blister  Not painful  Will continue to monitor  -Infectious disease consulted patient's lesion is already crusted and as per ID not consistent with zoster ophthalmicus and no further treatment is advised.  -Patient received 1 dose of Valtrex  -- Rash not in typical dermatomal pattern  - VZV PCR pending   Addendum PCR negative           # Constipation  Resolved with Prune juice           Most recent lab results reviewed with pt.      Breathing improved since hospital discharge.  Intermittent  "clear cough.  Denies current fevers or chills.  Appetite okay.  Patient feeling more anxious despite use of Lexapro.  Still drinking 2 or 3 glasses of wine at home per day and not interested in decreasing the intake  Patient does not wish to have further hospitalizations.  Having conversations with palliative care but not ready yet to start hospice care.  Needs POLST form completed  Has some increased lower extremity edema.  Denies current orthopnea or PND.  Previous chest x-ray showed infiltrates possibly related to recent influenza versus some heart failure in addition.  Previous ear blister issue resolved  Undergoing physical therapy at home with home care.  Slowly getting around at home.  No falls since home.  Ambulating with a walker  Had anemia throughout hospital stay. States has some hemorrhoids that had bled in past but has notr laisha bleeding more recently in hospital or since home. Rare pain         Additional ROS:   Constitutional, HEENT, Cardiovascular, Pulmonary, GI and , Neuro, MSK and Psych review of systems/symptoms are otherwise negative or unchanged from previous, except as noted above.      OBJECTIVE:  /69   Pulse 77   Temp 97.1  F (36.2  C) (Temporal)   Ht 1.626 m (5' 4\")   Wt 84.8 kg (187 lb)   SpO2 97%   BMI 32.10 kg/m     Estimated body mass index is 32.1 kg/m  as calculated from the following:    Height as of this encounter: 1.626 m (5' 4\").    Weight as of this encounter: 84.8 kg (187 lb).     HENT: ear canals and TM's normal and nose and mouth without ulcers or lesions   Neck: no adenopathy. Thyroid normal to palpation. No bruits  Pulm: Lungs clear to auscultation except for slight crackle left base  CV: Regular rates and rhythm  GI: Soft, nontender, Normal active bowel sounds, No hepatosplenomegaly or masses palpable  Ext: Peripheral pulses intact. One plus BLE edema.  Neuro: Alert and appropriate in regards to decision-making capacity.  Strength globally 4+/5.   In a WC today " and gait not assessed  Rectal: Pt declines exam    MED REC REQUIRED  Post Medication Reconciliation Status:  Discharge medications reconciled and changed, see notes/orders      (Chart documentation was completed, in part, with [email or web address] voice-recognition software. Even though reviewed, some grammatical, spelling, and word errors may remain.)    Frank Kovacs MD  Internal Medicine Department  [facility]

## 2025-01-06 NOTE — TELEPHONE ENCOUNTER
Pt's granddaughter Kristi  states pt was called by PCP with advice to seek care at ED due to anemia. Granddaughter called to find if pt was going to be admitted or if she should go through ER.  Pt gave triage verbal consent to speak to  granddatash Kristi.     Per 01/06/2024 lab note, granddaughter was informed pt needs to seek care at ER. Family hopes to have pt directly admitted due to prolonged wait at ER. Routing message to PCP per robyn request.

## 2025-01-07 LAB
ANION GAP SERPL CALCULATED.3IONS-SCNC: 12 MMOL/L (ref 7–15)
BUN SERPL-MCNC: 12 MG/DL (ref 8–23)
CALCIUM SERPL-MCNC: 8.7 MG/DL (ref 8.8–10.4)
CHLORIDE SERPL-SCNC: 105 MMOL/L (ref 98–107)
CREAT SERPL-MCNC: 1.14 MG/DL (ref 0.51–0.95)
EGFRCR SERPLBLD CKD-EPI 2021: 46 ML/MIN/1.73M2
ERYTHROCYTE [DISTWIDTH] IN BLOOD BY AUTOMATED COUNT: 18.1 % (ref 10–15)
GLUCOSE SERPL-MCNC: 96 MG/DL (ref 70–99)
HCO3 SERPL-SCNC: 22 MMOL/L (ref 22–29)
HCT VFR BLD AUTO: 24.2 % (ref 35–47)
HGB BLD-MCNC: 7.4 G/DL (ref 11.7–15.7)
HGB BLD-MCNC: 7.6 G/DL (ref 11.7–15.7)
HGB BLD-MCNC: 7.6 G/DL (ref 11.7–15.7)
HGB BLD-MCNC: 8.4 G/DL (ref 11.7–15.7)
MCH RBC QN AUTO: 26.3 PG (ref 26.5–33)
MCHC RBC AUTO-ENTMCNC: 31.4 G/DL (ref 31.5–36.5)
MCV RBC AUTO: 84 FL (ref 78–100)
PLATELET # BLD AUTO: 378 10E3/UL (ref 150–450)
POTASSIUM SERPL-SCNC: 3.7 MMOL/L (ref 3.4–5.3)
RBC # BLD AUTO: 2.89 10E6/UL (ref 3.8–5.2)
SODIUM SERPL-SCNC: 139 MMOL/L (ref 135–145)
WBC # BLD AUTO: 6.6 10E3/UL (ref 4–11)

## 2025-01-07 PROCEDURE — 85018 HEMOGLOBIN: CPT | Performed by: STUDENT IN AN ORGANIZED HEALTH CARE EDUCATION/TRAINING PROGRAM

## 2025-01-07 PROCEDURE — 80048 BASIC METABOLIC PNL TOTAL CA: CPT | Performed by: STUDENT IN AN ORGANIZED HEALTH CARE EDUCATION/TRAINING PROGRAM

## 2025-01-07 PROCEDURE — 250N000013 HC RX MED GY IP 250 OP 250 PS 637: Performed by: STUDENT IN AN ORGANIZED HEALTH CARE EDUCATION/TRAINING PROGRAM

## 2025-01-07 PROCEDURE — 85018 HEMOGLOBIN: CPT | Performed by: INTERNAL MEDICINE

## 2025-01-07 PROCEDURE — 36415 COLL VENOUS BLD VENIPUNCTURE: CPT | Performed by: STUDENT IN AN ORGANIZED HEALTH CARE EDUCATION/TRAINING PROGRAM

## 2025-01-07 PROCEDURE — 36415 COLL VENOUS BLD VENIPUNCTURE: CPT | Performed by: INTERNAL MEDICINE

## 2025-01-07 PROCEDURE — 99232 SBSQ HOSP IP/OBS MODERATE 35: CPT | Performed by: INTERNAL MEDICINE

## 2025-01-07 PROCEDURE — 250N000013 HC RX MED GY IP 250 OP 250 PS 637

## 2025-01-07 PROCEDURE — 250N000013 HC RX MED GY IP 250 OP 250 PS 637: Performed by: INTERNAL MEDICINE

## 2025-01-07 PROCEDURE — 80051 ELECTROLYTE PANEL: CPT | Performed by: STUDENT IN AN ORGANIZED HEALTH CARE EDUCATION/TRAINING PROGRAM

## 2025-01-07 PROCEDURE — 85041 AUTOMATED RBC COUNT: CPT | Performed by: STUDENT IN AN ORGANIZED HEALTH CARE EDUCATION/TRAINING PROGRAM

## 2025-01-07 PROCEDURE — 120N000001 HC R&B MED SURG/OB

## 2025-01-07 RX ADMIN — BUSPIRONE HYDROCHLORIDE 15 MG: 10 TABLET ORAL at 19:48

## 2025-01-07 RX ADMIN — ATORVASTATIN CALCIUM 20 MG: 20 TABLET, FILM COATED ORAL at 09:16

## 2025-01-07 RX ADMIN — BUSPIRONE HYDROCHLORIDE 15 MG: 10 TABLET ORAL at 09:16

## 2025-01-07 RX ADMIN — AMLODIPINE BESYLATE 10 MG: 10 TABLET ORAL at 19:53

## 2025-01-07 RX ADMIN — LOSARTAN POTASSIUM 100 MG: 100 TABLET, FILM COATED ORAL at 09:16

## 2025-01-07 RX ADMIN — Medication 1 TABLET: at 12:30

## 2025-01-07 RX ADMIN — RAMELTEON 4 MG: 8 TABLET ORAL at 09:16

## 2025-01-07 RX ADMIN — PSYLLIUM HUSK 1 PACKET: 3.4 POWDER ORAL at 12:30

## 2025-01-07 RX ADMIN — PANTOPRAZOLE SODIUM 40 MG: 40 TABLET, DELAYED RELEASE ORAL at 19:49

## 2025-01-07 RX ADMIN — PANTOPRAZOLE SODIUM 40 MG: 40 TABLET, DELAYED RELEASE ORAL at 09:16

## 2025-01-07 RX ADMIN — ESCITALOPRAM OXALATE 10 MG: 10 TABLET ORAL at 09:16

## 2025-01-07 ASSESSMENT — ACTIVITIES OF DAILY LIVING (ADL)
ADLS_ACUITY_SCORE: 60
ADLS_ACUITY_SCORE: 58
ADLS_ACUITY_SCORE: 58
ADLS_ACUITY_SCORE: 60
ADLS_ACUITY_SCORE: 64
ADLS_ACUITY_SCORE: 60
ADLS_ACUITY_SCORE: 58
ADLS_ACUITY_SCORE: 64
ADLS_ACUITY_SCORE: 58
ADLS_ACUITY_SCORE: 58
ADLS_ACUITY_SCORE: 60
ADLS_ACUITY_SCORE: 60
ADLS_ACUITY_SCORE: 58
ADLS_ACUITY_SCORE: 60
ADLS_ACUITY_SCORE: 64
ADLS_ACUITY_SCORE: 58
ADLS_ACUITY_SCORE: 60
ADLS_ACUITY_SCORE: 58

## 2025-01-07 NOTE — PROVIDER NOTIFICATION
MD Notification    Notified Provider: Lashell Gordon MD    Notification Interaction: jake    Purpose of Notification: Delivered - 9:11 pm on 1/6/25    Patient is requesting a sleep medication her doctor recommended today. I don't see one ordered or anything about it in the notes. All the patient mentioned was that it was stronger than melatonin and she doesn't normally take it but her doctor recommended she take it to help her sleep in the ED    Orders received: orders received from hospitalist

## 2025-01-07 NOTE — CONSULTS
[facility]  Colon and Rectal Surgery Consultation    Patient: Janet Cope MRN: [id number]   Age: 89 year old YOB: 1934     Date of Admission: 1/6/2025       Date of Consultation: 01/07/2025       Reason for consult: Bleeding hemorrhoids       Requesting physician: Aquilino Crandall MD (Hospitalist)       Level of consult: Consult, follow and place orders           Assessment/Plan   Janet Cope is a 89 year old female with PMH of DM2, HTN, alcohol use disorder, recent admission for influenza A with superimposed bacterial PNA and acute hypoxemic respiratory failure. She is admitted on 1/6/24 for acute anemia (hgb 7.2 from baseline ~9s) in the setting of rectal bleeding. She received 1U pRBC in the ED. On exam, there is old blood in her briefs and on the perianal skin. The anorectal exam is overall benign. DEBBIE consistent with internal hemorrhoids but no active bleeding.     She absolutely does not want another colonoscopy or a hemorrhoidectomy. She is willing to consider hemorrhoid banding, but discussed with her and family that she may need serial banding if her internal hemorrhoids are very large and symptomatic. This is a relatively slow process since each banding session is about every 6 weeks. This is also an outpatient/clinic procedure and the equipment is not available inpatient. She and her family would prefer very conservative management. Advised keeping stools soft and regular with a daily fiber supplement. She is current taking 1 capsule of Metamucil intermittently. Discussed that the recommended dose of Metamucil is 1 tablespoon or 5 capsules per day. She would prefer 1 tablespoon of the powder over 5 capsules. Also recommend an iron supplement. She already discontinued her baby aspirin and is not interested in restarting it. This likely also helps prevent bleeding.    Plan:  - Greatly appreciate Medicine cares.  - No plans for surgical  intervention this admission.   - Start Metamucil 1 packet (or 1 tablespoon) daily. Discussed that some bloating/flatulence is expected in the first few weeks.   - Start oral iron supplement with vitamin C.  - Avoid ASA and other blood thinners if goal continues to be comfort-focused.  - Offered outpatient follow up with [facility]- family declined for now. They want to see how conservative management goes and follow up with their PCP first. Gave them our clinic number ([phone]) in case they want to schedule follow up).  - CRS will sign off at this time. Please reach out with questions.    Martha Torres PA-C  Colon and Rectal Surgery  [facility]  Please search [facility]'s directory under Colon & Rectal Surgery / [facility] to view the current call schedule.    The above plan of care was performed and communicated to me by Dr. Liset Zaldivar.         History of Present Illness   CC: rectal bleeding     History is obtained from the patient, family, and chart review.    Janet was recently admitted 12/21/-12/28/24 for influenza A with superimposed bacterial PNA. During that admission, TCU was recommended but family preferred to bring her home to avoid aggressive treatments or procedures. She eventually discharged home with family assist and home care. There were also discussions about hospice but she did not qualify for this.     She followed up with her PCP yesterday where routine labs revealed hgb of 7.5. Janet reports having hemorrhoids for most of her life and rectal bleeding on/off for many years. This current episode of bleeding started before the last admission and worsened during the hospitalization. She describes significant bright red blood with BMs that would continue dripping after she finished wiping. She always wears a pad because of the bleeding and has to change it out a few times during the day. In the past few days the bleeding seems to be getting better. She stopped taking her  baby aspirin last week. She previously had hemorrhoid banding done a couple times. The last one was at least 10 years ago. These helped reduce the bleeding for some time.     At baseline her BMs have always been irregular. Sometimes she is constipated and sometimes she has loose stools. Since her admission, her stools are mushy but she thinks this is because she has only been eating liquids (like Ensure). If she gets too constipated then she will take 1 Metamucil capsule. She is afraid that taking this too frequently will cause explosive diarrhea.     Last colonoscopy 2007 (Dr. Gómez, [facility]) with descending and sigmoid colon diverticulosis and external hemorrhoids present. No biopsies or specimens were collected.         Past Medical History     Past Medical History:   Diagnosis Date    Anxiety     Basal cell carcinoma     CKD (chronic kidney disease) stage 3, GFR 30-59 ml/min (H)     Depression     Diverticulosis of colon (without mention of hemorrhage) 8/02    colonoscopy negative other than sigmoid diverticulosis    Enthesopathy of hip region     Bilateral    Essential hypertension, benign     Generalized osteoarthrosis, unspecified site     Macular degeneration     Macular degeneration, dry     Osteoporosis, unspecified     Other specified gastritis without mention of hemorrhage     Personal history of colonic polyps 7/03     had hyperplastic polyps 2013    Personal history of malignant neoplasm of breast 1995 & 9/01    left breast cancer (1995), right breast cancer (9/01); patient sees Dr. Martinez regularly    Postmenopausal bleeding 5/02    endometrial biopsies negative; s/p D & C    Type 2 diabetes mellitus with diabetic chronic kidney disease  (goal A1C<7) 10/24/2015    Unspecified hemorrhoids without mention of complication 10/05    Internal, s/p banding ligature             Past Surgical History     Past Surgical History:   Procedure Laterality Date    BREAST LUMPECTOMY, RT/LT  1995    Breast Lumpectomy RT     BREAST LUMPECTOMY, RT/LT  2001    left    BREAST RADIATION TX RT/LT      right     BREAST RADIATION TX RT/LT  2001    left    HYSTERECTOMY, VAGINAL      STRESS ECHO (METRO)  2002    normal             Social History     Social History     Socioeconomic History    Marital status:      Spouse name: Not on file    Number of children: Not on file    Years of education: Not on file    Highest education level: Not on file   Occupational History    Not on file   Tobacco Use    Smoking status: Former     Current packs/day: 0.00     Types: Cigarettes     Quit date: 10/10/1981     Years since quittin.2    Smokeless tobacco: Never   Vaping Use    Vaping status: Never Used   Substance and Sexual Activity    Alcohol use: Yes     Alcohol/week: 21.0 - 28.0 standard drinks of alcohol     Types: 21 - 28 Glasses of wine per week     Comment: 3-4 glass of wine daily    Drug use: No    Sexual activity: Never     Partners: Male   Other Topics Concern    Parent/sibling w/ CABG, MI or angioplasty before 65F 55M? Not Asked   Social History Narrative    Not on file     Social Drivers of Health     Financial Resource Strain: Low Risk  (10/13/2023)    Financial Resource Strain     Within the past 12 months, have you or your family members you live with been unable to get utilities (heat, electricity) when it was really needed?: No   Food Insecurity: Low Risk  (10/13/2023)    Food Insecurity     Within the past 12 months, did you worry that your food would run out before you got money to buy more?: No     Within the past 12 months, did the food you bought just not last and you didn t have money to get more?: No   Transportation Needs: Low Risk  (10/13/2023)    Transportation Needs     Within the past 12 months, has lack of transportation kept you from medical appointments, getting your medicines, non-medical meetings or appointments, work, or from getting things that you need?: No   Physical Activity: Not on File  (2019)    Received from [facility]    Physical Activity   Stress: Not on File (2019)    Received from [facility]    Stress     Stress: 0   Social Connections: Unknown (2022)    [facility]    Social Connections     Frequency of Communication with Friends and Family: Not on file   Interpersonal Safety: Low Risk  (2024)    Interpersonal Safety     Do you feel physically and emotionally safe where you currently live?: Yes     Within the past 12 months, have you been hit, slapped, kicked or otherwise physically hurt by someone?: No     Within the past 12 months, have you been humiliated or emotionally abused in other ways by your partner or ex-partner?: No   Housing Stability: Low Risk  (10/13/2023)    Housing Stability     Do you have housing? : Yes     Are you worried about losing your housing?: No             Family History     Family History   Problem Relation Age of Onset    Diabetes Mother             Heart Disease Mother              Allergies      Allergies   Allergen Reactions    Aromasin [Exemestane]      Muscle weakness, pain in legs    Lisinopril      Cough and Dizziness.             Medications     Current Facility-Administered Medications:     acetaminophen (TYLENOL) tablet 650 mg, 650 mg, Oral, At Bedtime PRN, Aquilino Crandall MD    amLODIPine (NORVASC) tablet 10 mg, 10 mg, Oral, QPM, Aquilino Crandall MD, 10 mg at 25 2347    atorvastatin (LIPITOR) tablet 20 mg, 20 mg, Oral, Daily, Aquilino Crandall MD    busPIRone (BUSPAR) tablet 15 mg, 15 mg, Oral, BID, Aquilino Crandall MD, 15 mg at 25 2347    calcium carbonate (TUMS) chewable tablet 1,000 mg, 1,000 mg, Oral, 4x Daily PRN, Aquilino Crandall MD    escitalopram (LEXAPRO) tablet 10 mg, 10 mg, Oral, Daily, Aquilino Crandall MD    [Held by provider] furosemide  (LASIX) tablet 20 mg, 20 mg, Oral, Daily, Aquilino Crandall MD    guaiFENesin-dextromethorphan (ROBITUSSIN DM) 100-10 MG/5ML syrup 10 mL, 10 mL, Oral, 4x Daily PRN, Aquilino Crandall MD    lidocaine (LMX4) cream, , Topical, Q1H PRN, Aquilino Crandall MD    lidocaine 1 % 0.1-1 mL, 0.1-1 mL, Other, Q1H PRN, Aquilino Crandall MD    losartan (COZAAR) tablet 100 mg, 100 mg, Oral, Daily, Aquilino Crandall MD    pantoprazole (PROTONIX) EC tablet 40 mg, 40 mg, Oral, BID, Aquilino Crandall MD, 40 mg at 01/06/25 2347    ramelteon (ROZEREM) half-tab 4 mg, 4 mg, Oral, At Bedtime PRN, Shaq England MD    senna-docusate (SENOKOT-S/PERICOLACE) 8.6-50 MG per tablet 1 tablet, 1 tablet, Oral, BID PRN **OR** senna-docusate (SENOKOT-S/PERICOLACE) 8.6-50 MG per tablet 2 tablet, 2 tablet, Oral, BID PRN, Aquilino Crandall MD    sodium chloride (PF) 0.9% PF flush 3 mL, 3 mL, Intracatheter, Q8H, Aquilino Crandall MD, 3 mL at 01/06/25 2150    sodium chloride (PF) 0.9% PF flush 3 mL, 3 mL, Intracatheter, q1 min prn, Aquilino Crandall MD          Review of Systems      All other review of systems negative, except for what is mentioned above        Physical Exam   BP (!) 143/68 (BP Location: Left arm)   Pulse 95   Temp 97.5  F (36.4  C) (Oral)   Resp 16   SpO2 96%   General: alert, oriented, in no acute distress, laying and sitting comfortably  Respiratory: non-labored breathing on RA  Perianal External Examination:  Perianal skin: Intact with no excoriation or lichenification. Old blood present.  Lesions: No evidence of an external lesion, nodularity, or induration in the perianal region.  Eversion of buttocks: There was not evidence of an anal fissure. Details: N/A.  Skin tags or external hemorrhoids: Yes: Mixed hemorrhoids present. No active bleeding.    Digital Rectal Examination: Was performed.   Sphincter tone: Fair.  Palpable  lesions: No.  Other: None.   Bimanual examination: was not performed.  No blood on gloved finger.    Anoscopy: Was deferred.         Data     Results for orders placed or performed during the hospital encounter of 01/06/25 (from the past 24 hours)   CBC with platelets differential    Narrative    The following orders were created for panel order CBC with platelets differential.  Procedure                               Abnormality         Status                     ---------                               -----------         ------                     CBC with platelets and d...[[id number]]  Abnormal            Final result                 Please view results for these tests on the individual orders.   ABO/Rh type and screen    Narrative    The following orders were created for panel order ABO/Rh type and screen.  Procedure                               Abnormality         Status                     ---------                               -----------         ------                     Adult Type and Screen[[id number]]                            Final result                 Please view results for these tests on the individual orders.   Basic metabolic panel   Result Value Ref Range    Sodium 136 135 - 145 mmol/L    Potassium 3.9 3.4 - 5.3 mmol/L    Chloride 104 98 - 107 mmol/L    Carbon Dioxide (CO2) 22 22 - 29 mmol/L    Anion Gap 10 7 - 15 mmol/L    Urea Nitrogen 12.0 8.0 - 23.0 mg/dL    Creatinine 1.12 (H) 0.51 - 0.95 mg/dL    GFR Estimate 46 (L) >60 mL/min/1.73m2    Calcium 8.7 (L) 8.8 - 10.4 mg/dL    Glucose 123 (H) 70 - 99 mg/dL   CBC with platelets and differential   Result Value Ref Range    WBC Count 8.7 4.0 - 11.0 10e3/uL    RBC Count 2.82 (L) 3.80 - 5.20 10e6/uL    Hemoglobin 7.2 (L) 11.7 - 15.7 g/dL    Hematocrit 23.8 (L) 35.0 - 47.0 %    MCV 84 78 - 100 fL    MCH 25.5 (L) 26.5 - 33.0 pg    MCHC 30.3 (L) 31.5 - 36.5 g/dL    RDW 19.0 (H) 10.0 - 15.0 %    Platelet Count 436 150 - 450 10e3/uL    % Neutrophils 74  %    % Lymphocytes 14 %    % Monocytes 10 %    % Eosinophils 1 %    % Basophils 1 %    % Immature Granulocytes 0 %    NRBCs per 100 WBC 0 <1 /100    Absolute Neutrophils 6.5 1.6 - 8.3 10e3/uL    Absolute Lymphocytes 1.2 0.8 - 5.3 10e3/uL    Absolute Monocytes 0.9 0.0 - 1.3 10e3/uL    Absolute Eosinophils 0.1 0.0 - 0.7 10e3/uL    Absolute Basophils 0.0 0.0 - 0.2 10e3/uL    Absolute Immature Granulocytes 0.0 <=0.4 10e3/uL    Absolute NRBCs 0.0 10e3/uL   Adult Type and Screen   Result Value Ref Range    ABO/RH(D) O POS     Antibody Screen Negative Negative    SPECIMEN EXPIRATION DATE [id number]    Prepare red blood cells (unit)   Result Value Ref Range    Blood Component Type Red Blood Cells     Product Code [id number]     Unit Status Transfused     Unit Number [id number]     CROSSMATCH Compatible     CODING SYSTEM [id number]     ISSUE DATE AND TIME [id number]     UNIT ABO/RH O+     UNIT TYPE ISBT 5100    Hemoglobin   Result Value Ref Range    Hemoglobin 8.4 (L) 11.7 - 15.7 g/dL        I spent 60 minutes face-to-face or coordinating care of Janet Cope. Over 50% of our time on the unit was spent counseling the patient and/or coordinating care as documented in the assessment and plan.

## 2025-01-07 NOTE — CONSULTS
89 year old female admitted on 1/6/2025. She Janet Cope is a 89 year old female with past medical history significant for daily alcohol use, diabetes mellitus type 2 who presents with cough, shortness of breath, headache, poor intake. Admitted on 12/21/2024 to 12/28/24   For influenza A and superimposed bacterial pneumonia with acute hypoxemic respiratory failure.   Acute on chronic anemia  rectal bleeding  Hb 7.2 baseline hb around 9.  H/O hemorrhoids.  Supportive care  Serial Hb  Type and cross.  Clear liquid diet    Antony Torres MD FACP  [facility]

## 2025-01-07 NOTE — PLAN OF CARE
Goal Outcome Evaluation:    Diagnosis: Acute on chronic anemia, rectal bleeding.   Mental Status: A&O x4   Activity/dangle: Assist x1 w/ gb and walker.   Diet: NPO   Arroyo/Voiding: Bedside commode.   Pain: Denies   Tele/Restraints/Iso: N/A  02/LDA: On room air, PIV SL.   D/C Date: TBD.  Other Info: Unable to do a full skin assessment and admission questions at this time. Pt asked to do them in the morning. Daughter at bedside.

## 2025-01-07 NOTE — PHARMACY-ADMISSION MEDICATION HISTORY
"Pharmacist Admission Medication History    Admission medication history is complete. The information provided in this note is only as accurate as the sources available at the time of the update.    Information Source(s): Patient, Family member, and CareEverywhere/SureScripts via in-person    Pertinent Information:   -aspirin: was on 12/28 discharge summary as a medication to continue (81 mg daily). Appears it was removed from med list by an outside provider earlier today with reason of \"stopped by patient.\" Unclear history but patient/family believe this was stopped in the past days and believe they may have been told this medication was not critical for patient to be on? Uncertain.  -buspirone: dose was increased today by prescriber from 7.5 mg BID to 15 mg BID. Patient took 7.5 mg this morning.  -furosemide: this was prescribed this AM. Rx not ready/picked up, not started yet.   -losartan: 12/28 discharge summary states \"hold losartan due to TRINI and can resume in 3 days.\" Unclear if this was held for 3 days but regardless, patient is currently taking this.   -ramelteon: this was also removed from med list today by outside provider with reason of \"stopped by patient.\" Patient reports she never took this after leaving the hospital. Not added back to med list.     Changes made to PTA medication list:  Added: None  Deleted: None  Changed: furosemide (marked as NOT STARTED, see note above), omeprazole (added directions back)    Allergies reviewed with patient and updates made in EHR: no, already reviewed    Medication History Completed By: Leidy Arteaga RPH 1/6/2025 7:44 PM    Prior to Admission medications    Medication Sig Last Dose Taking? Auth Provider Long Term End Date   acetaminophen (TYLENOL) 650 MG CR tablet Take 1 tablet (650 mg) by mouth nightly as needed for mild pain or fever 1/6/2025 Yes Frank Kovacs MD     amLODIPine (NORVASC) 10 MG tablet TAKE 1 TABLET(10 MG) BY MOUTH DAILY IN THE EVENING  Patient " taking differently: Take 10 mg by mouth every evening. 1/5/2025 Yes Frank Kovacs MD Yes    atorvastatin (LIPITOR) 20 MG tablet TAKE 1 TABLET(20 MG) BY MOUTH DAILY 1/6/2025 Yes Frank Kovacs MD Yes    busPIRone (BUSPAR) 15 MG tablet Take 1 tablet (15 mg) by mouth 2 times daily. For anxiety 1/6/2025 Morning Yes Frank Kovacs MD Yes    escitalopram (LEXAPRO) 10 MG tablet TAKE 1 TABLET(10 MG) BY MOUTH DAILY  Patient taking differently: Take 10 mg by mouth daily. 1/6/2025 Yes Frank Kovacs MD Yes    losartan (COZAAR) 100 MG tablet Take 1 tablet (100 mg) by mouth daily. 1/6/2025 Yes Aquilino Crandall MD Yes    omeprazole (PRILOSEC) 20 MG DR capsule TAKE 1 CAPSULE(20 MG) BY MOUTH TWICE DAILY  Patient taking differently: Take 20 mg by mouth 2 times daily. TAKE 1 CAPSULE(20 MG) BY MOUTH TWICE DAILY 1/6/2025 Morning Yes Frank Kovacs MD     furosemide (LASIX) 20 MG tablet Take 1 tablet (20 mg) by mouth daily.   Frank Kovacs MD Yes

## 2025-01-07 NOTE — ED NOTES
Accessed chart to help with locating a medication.    Complete the medications as prescribed.  Follow-up with an allergist to determine cause of allergy.  Return to the emergency room if symptoms worsen.    Allergies, Pediatric  An allergy is a condition that causes the body's defense system (immune system) to react too strongly to an allergen. An allergen is a substance that is harmless to most people but can cause a reaction in some people.    Allergies often affect the nose (allergic rhinitis), eyes (allergic conjunctivitis), skin (atopic dermatitis), and stomach. They can be mild, moderate, or severe. They cannot spread from person to person. Allergies can start at any age. In some cases, they may go away as your child gets older.    What are the causes?  Allergies are caused by allergens. These may be:  Outdoor allergens. These include pollen, car fumes, and mold.  Indoor allergens. These include dust, smoke, mold, and pet dander.  Other allergens. These include foods, medicines, scents, and insect bites or stings.  What increases the risk?  Your child is more likely to have allergies if they have:  Family members with allergies.  Family members who have a condition that may be caused by allergens, such as asthma.  What are the signs or symptoms?  Symptoms depend on how severe the allergy is.    Mild to moderate symptoms    Runny nose, stuffy nose (nasal congestion), or sneezing.  Itchy mouth, ears, or throat.  Postnasal drip. This is a feeling of mucus dripping down the back of your child's throat.  Sore throat.  Itchy, red, watery, or puffy eyes.  Skin rash, or itchy, red, swollen areas of skin (hives).  Stomach cramps or bloating.  Severe symptoms    A bad allergy to food, medicine, or insect bites may cause a severe allergic reaction (anaphylactic reaction). Symptoms include:  A red face.  Coughing or high-pitched whistling sounds when your child breathes out (wheezing).  Swollen lips, tongue, or mouth.  A tight or swollen throat.  Chest pain or tightness, or a fast heartbeat.  Trouble breathing or shortness of breath.  Pain in the abdomen.  Vomiting or diarrhea.  Feeling dizzy or fainting.  How is this diagnosed?  Allergies are diagnosed based on your child's symptoms, family and medical history, and a physical exam. Your child may also have tests, such as:  Skin tests. These may be done to see how your child's skin reacts to allergens. Tests include:  Skin prick test. For this test, the allergen is put in your child's body through a small prick in the skin.  Intradermal skin test. For this test, a small amount of the allergen is put under the first layer of your child's skin.  Patch test. For this test, a small amount of the allergen is placed on your child's skin. The area is covered and then checked after a few days.  Blood tests.  A challenge test. For this test, your child eats or breathes in the allergen to see if they have a reaction.  You may be asked to:  Keep a food diary for your child. This tracks all the foods, drinks, and symptoms your child has each day.  Try an elimination diet with your child. To do this:  Take certain foods out of your child's diet.  Add those foods back one by one to find out if any of them cause a reaction.  How is this treated?  A person putting eye drops in an eye.  A person's nose with a nasal spray bottle and the upward direction it should enter the nostril.  Treatment for allergies depends on your child's age and symptoms. It may include:  Cold, wet cloths (cold compresses). These can be used to soothe itching and swelling.  Eye drops or nasal sprays.  A saline solution to clear out your child's nose and keep it moist (nasal irrigation). A saline solution is made of salt and water.  A humidifier. This can add moisture to the air.  Skin creams. These can treat rashes or itching.  Diet changes to cut out foods that cause allergies.  Exposing your child again and again to tiny amounts of allergens. This can help your child's body build a defense against the allergens (tolerance). The process is called immunotherapy. It may be done using:  Allergy shots. This is when your child gets a shot of the allergen.  Sublingual immunotherapy. This is when your child takes a small dose of allergen under their tongue.  Allergy medicines (antihistamines) or other medicines. These can help block the allergic reaction.  Using an auto-injector pen. An auto-injector pen is a device filled with medicine that gives an emergency shot of epinephrine. The health care provider will teach you how to give the shot.  Follow these instructions at home:  Medicines    A person using an auto-injector pen in the thigh.  Give or apply over-the-counter and prescription medicines only as told by your child's provider.  Have your child always carry an auto-injector pen if they are at risk of an anaphylactic reaction. Give your child the shot as told by the provider.  Eating and drinking    Follow instructions from your child's provider about what they may eat and drink.  Have your child drink enough fluid to keep their pee (urine) pale yellow.  General instructions    Have your child wear a medical alert bracelet or necklace if they have had an anaphylactic reaction in the past.  Help your child avoid known allergens.  Talk with your child's school staff and caregivers about your child's allergies and how to prevent them. Make a plan that includes what to do if your child has a severe reaction.  Keep all follow-up visits. The provider will watch your child's symptoms and talk about treatment options.  Contact a health care provider if:  Your child's symptoms do not get better with treatment.  Get help right away if:  Your child has symptoms of anaphylaxis.  You have to use the auto-injector pen on your child. Your child will need more medical care even if the medicine seems to be working. An anaphylactic reaction may happen again within 72 hours (rebound anaphylaxis).  These symptoms may be an emergency. Do not wait to see if the symptoms will go away. Use the auto-injector pen right away. Then, call 911.    This information is not intended to replace advice given to you by your health care provider. Make sure you discuss any questions you have with your health care provider.

## 2025-01-07 NOTE — PLAN OF CARE
"RBC transfused this shift. STAT Hgb recheck ordered. Patient transferred care to inpatient nursing unit with daughter at bedside for support. Patient scored '1' on CIWA scale for mild anxiety. PRN bedtime medication ordered and medication not available to give in ED.    Goal Outcome Evaluation:      Plan of Care Reviewed With: patient    Overall Patient Progress: no change    Problem: Adult Inpatient Plan of Care  Goal: Plan of Care Review  Description: The Plan of Care Review/Shift note should be completed every shift.  The Outcome Evaluation is a brief statement about your assessment that the patient is improving, declining, or no change.  This information will be displayed automatically on your shift  note.  Outcome: Progressing  Flowsheets (Taken 1/6/2025 2223)  Plan of Care Reviewed With: patient  Overall Patient Progress: no change  Goal: Patient-Specific Goal (Individualized)  Description: You can add care plan individualizations to a care plan. Examples of Individualization might be:  \"Parent requests to be called daily at 9am for status\", \"I have a hard time hearing out of my right ear\", or \"Do not touch me to wake me up as it startles  me\".  Outcome: Progressing  Goal: Absence of Hospital-Acquired Illness or Injury  Outcome: Progressing  Intervention: Identify and Manage Fall Risk  Recent Flowsheet Documentation  Taken 1/6/2025 2100 by Mary Bess RN  Safety Promotion/Fall Prevention: safety round/check completed  Intervention: Prevent Skin Injury  Recent Flowsheet Documentation  Taken 1/6/2025 2100 by Mary Bess RN  Body Position: position changed independently  Skin Protection: adhesive use limited  Intervention: Prevent and Manage VTE (Venous Thromboembolism) Risk  Recent Flowsheet Documentation  Taken 1/6/2025 2100 by Mary Bess RN  VTE Prevention/Management:   SCDs off (sequential compression devices)   patient refused intervention  Intervention: Prevent Infection  Recent Flowsheet " Documentation  Taken 1/6/2025 2100 by Mary Bess, RN  Infection Prevention:   rest/sleep promoted   hand hygiene promoted  Goal: Optimal Comfort and Wellbeing  Outcome: Progressing  Goal: Readiness for Transition of Care  Outcome: Progressing

## 2025-01-07 NOTE — PROGRESS NOTES
[facility]    Internal Medicine Hospitalist Progress Note  01/07/2025  I evaluated patient on the above date.    Shaq England Jr., MD  [phone] (p)  Text Page  Vocera      [New actions/orders today (01/07/2025) are underlined in the assessment and plan. All lab results in the assessment and plan were reviewed.]  Assessment & Plan      Janet Cope is a 89 year old female with history including DM2; HTN; DLD; depression/anxiety; GERD; alcohol use; and recent hospitalization (12/21-12/28/2024) for issues including influenza A and superimposed bacterial pneumonia with acute hypoxemic respiratory failure. She presented 1/6/2025 with rectal bleeding.    On initial evaluation, pt was afebrile, normotensive. Labs notable for CBC with WBC normal, hgb 7.2 (hgb 9 range recent hospitalization); BMP with cr 1.12. CXR showed similar interstitial changes compared to previous (12/22/2024) given the rotated view, no effusions.      Rectal bleeding, suspect lower from hemorrhoids.  Acute blood loss anemia due to above.  * Hemoglobin ~9 during the last hospitalization. PTA on omeprazole BID.  * Initial presentation as above. Hgb 7.5. Transfused 1U prbc';s on admit. GI and Colorectal surgery consulted on admit.   Recent Labs   Lab 01/07/25  0333 01/06/25  1608 01/06/25  1057   HGB 8.4* 7.2* 7.5*   - Continue NPO for now.  - Continue pantoprazole PO BID.  - GI and Colorectal surgery consulted, appreciate help.  - Continue to monitor CBC - repeat in am.  - Consider prbc transfusion if hgb </= 7.0 or if significant bleeding with hemodynamic instability or if symptomatic.    Recent influenza A and superimposed community-acquired pneumonia with respiratory failure.  * Recent hospitalization 12/21-12/28/2024 as noted.  * Initial presentation and imaging as above. Not requiring O2 on admit.  - Continue to monitor clinically.    Acute kidney injury, suspect prerenal.  * Cr 1.12 on admit. Given volume/prbc's  "on admit.  Recent Labs   Lab 01/06/25  1608 01/06/25  1057   CR 1.12* 1.12*   Estimated Creatinine Clearance: 35.4 mL/min (A) (based on SCr of 1.12 mg/dL (H)).  - Continue to monitor BMP - repeat in am.  - Avoid nephrotoxic medications.    Hypertension (benign essential).  H/o volume overload.  [PTA: amlodipine 10 mg daily; furosemide 20 mg daily (not started); losartan 100 mg daily.]  * Noted with volume overload last hospitalization.  * Initial presentation as above. Given IV furosemide with prbc transfusion.  - Continue amlodipine and losartan.  - Continue to hold PTA furosemide for now.  - Continue to monitor i/o's, daily wts.    GERD.  *  PTA on omeprazole BID.  - Continue pantoprazole BID.    Hyperlipidemia/dyslipidemia.  - Continue atorvastatin at discharge.    Depression/anxiety.  Previous issues with insomnia.  * Issues with anxiety and insomnia last hospitalization.  * Started on PRN ramelteon on admit.  - Continue buspirone and escitalopram.  - Continue PRN ramelteon.    Alcohol use d/o.  * Noted, drinks 3-4 glasses/wine daily.  - Continue to monitor clinically.    Obesity.  * There is no height or weight on file to calculate BMI.  - Needs to continue to pursue aggressive dietary and lifestyle modifications.      Clinically Significant Risk Factors Present on Admission                   # Hypertension: Noted on problem list      # Anemia: based on hgb <11  # Anemia: based on hgb <11       # Obesity: Estimated body mass index is 32.1 kg/m  as calculated from the following:    Height as of an earlier encounter on 1/6/25: 1.626 m (5' 4\").    Weight as of an earlier encounter on 1/6/25: 84.8 kg (187 lb).              Diet: NPO per Anesthesia Guidelines for Procedure/Surgery Except for: Meds    Prophylaxis: PCD's and ambulation  [place]royo Catheter: Not present  Lines: None     Code Status: No CPR- Do NOT Intubate    Disposition Plan   Medically Ready for Discharge: Anticipated in 1-2 Days  Expected discharge to " prior living arrangement pending above.    Entered: Shaq England MD 01/07/2025, 7:04 AM     COMMUNICATION  - I discussed with patient's daughter 01/07/25          Interval History   Did not sleep overnight, so is quite tired.  Notes continued blood from hemorrhoids.  Notes last colonoscopy was 10 years ago and was OK.    * Data reviewed today: I reviewed all new labs and imaging over the last 24 hours. I personally reviewed no images or ECG's today.    Physical Exam   Most recent vitals:   , Blood pressure (!) 152/70, pulse 93, temperature 97.6  F (36.4  C), resp. rate 16, SpO2 94%, not currently breastfeeding. O2 Device: None (Room air)    There were no vitals filed for this visit.  Vital signs with ranges:  Temp:  [97.1  F (36.2  C)-98.4  F (36.9  C)] 97.6  F (36.4  C)  Pulse:  [77-98] 93  Resp:  [16] 16  BP: (123-152)/(52-81) 152/70  SpO2:  [93 %-98 %] 94 %  Patient Vitals for the past 24 hrs:   BP Temp Temp src Pulse Resp SpO2   01/07/25 0300 (!) 152/70 97.6  F (36.4  C) -- 93 16 94 %   01/06/25 2240 (!) 147/67 97.5  F (36.4  C) Oral 96 16 95 %   01/06/25 2200 (!) 150/70 -- -- 94 -- 98 %   01/06/25 2147 (!) 140/53 -- -- 87 -- --   01/06/25 2146 -- 98.2  F (36.8  C) Oral -- -- 97 %   01/06/25 2145 (!) 140/53 -- -- 87 -- 96 %   01/06/25 2130 129/68 -- -- 92 -- 97 %   01/06/25 2100 132/52 -- -- 88 -- 97 %   01/06/25 2045 139/54 -- -- 89 -- 98 %   01/06/25 2030 125/58 -- -- 87 -- 95 %   01/06/25 2015 (!) 142/57 -- -- 87 -- 97 %   01/06/25 2000 131/57 -- -- 86 -- 96 %   01/06/25 1945 (!) 142/75 -- -- 98 -- 97 %   01/06/25 1930 132/52 -- -- 88 -- 97 %   01/06/25 1900 132/81 -- -- 97 -- 95 %   01/06/25 1856 132/81 98.4  F (36.9  C) -- 97 16 95 %   01/06/25 1854 -- 98.1  F (36.7  C) Oral 97 16 95 %   01/06/25 1730 -- -- -- -- -- 94 %   01/06/25 1506 126/60 98.2  F (36.8  C) Oral 98 16 93 %     I/O's last 24 hours:  I/O last 3 completed shifts:  In: 10 [I.V.:10]  Out: -     Constitutional: awake, alert, oriented,  "conversant   Head:   Eyes:   ENT:   Neck:   Cardiovascular: tachycardic, regular rhythm, no murmurs/rubs/gallops  Lungs: diminished in the bases, no crackles or wheezes  Gastrointestinal/Abdomen: soft, non-tender, non-distended, positive bowel sounds  :   Musculoskeletal:   Skin/Extremities:   Neurologic:   Psychiatric:   Hematologic/Lymphatic/Immunologic:        Labs reviewed.  Recent Labs   Lab 01/07/25  0333 01/06/25  1608 01/06/25  1057   WBC  --  8.7 7.2   HGB 8.4* 7.2* 7.5*   MCV  --  84 82   PLT  --  436 461*   NA  --  136 140   POTASSIUM  --  3.9 4.8   CHLORIDE  --  104 106   CO2  --  22 22   BUN  --  12.0 13.0   CR  --  1.12* 1.12*   ANIONGAP  --  10 12   MEL  --  8.7* 9.3   GLC  --  123* 99   ALBUMIN  --   --  3.5   PROTTOTAL  --   --  7.6   BILITOTAL  --   --  0.4   ALKPHOS  --   --  69   ALT  --   --  16   AST  --   --  32     Recent Labs   Lab Test 12/28/24  0855 12/22/24  0637   NT-PROBNP, INPATIENT 3,549* 5,616*     Recent Labs   Lab 01/06/25  1608 01/06/25  1057   * 99     Recent Labs   Lab Test 09/28/24  0916 05/23/24  0857   A1C 5.9* 6.1*       No results for input(s): \"INR\", \"[id number]\" in the last 168 hours.  Recent Labs   Lab 01/06/25  1608 01/06/25  1057   WBC 8.7 7.2       MICRO:  Cultures (including blood and urine):  No lab results found in last 7 days.    Recent Results (from the past 24 hours)   XR Chest 2 Views    Narrative    EXAM: XR CHEST 2 VIEWS  LOCATION: [facility]  DATE: 1/6/2025    INDICATION: Follow-up heart failure and question infiltrates.  COMPARISON: 12/22/2024      Impression    IMPRESSION: Similar interstitial changes compared to previous given the rotated view. No effusions.       Medications   All medications were reviewed. MAR.    Infusions:  Current Facility-Administered Medications   Medication Dose Route Frequency Provider Last Rate Last Admin     Scheduled Medications:  Current Facility-Administered Medications   Medication Dose Route " Frequency Provider Last Rate Last Admin    amLODIPine (NORVASC) tablet 10 mg  10 mg Oral QPM Aquilino Crandall MD   10 mg at 01/06/25 2347    atorvastatin (LIPITOR) tablet 20 mg  20 mg Oral Daily Aquilino Crandall MD        busPIRone (BUSPAR) tablet 15 mg  15 mg Oral BID Aquilino Crandall MD   15 mg at 01/06/25 2347    escitalopram (LEXAPRO) tablet 10 mg  10 mg Oral Daily Aquilino Crandall MD        furosemide (LASIX) tablet 20 mg  20 mg Oral Daily Aquilino Crandall MD        losartan (COZAAR) tablet 100 mg  100 mg Oral Daily Aquilino Crandall MD        pantoprazole (PROTONIX) EC tablet 40 mg  40 mg Oral BID Aquilino Crandall MD   40 mg at 01/06/25 2347    sodium chloride (PF) 0.9% PF flush 3 mL  3 mL Intracatheter Q8H Aquilino Crandall MD   3 mL at 01/06/25 2150     PRN Medications:  Current Facility-Administered Medications   Medication Dose Route Frequency Provider Last Rate Last Admin    acetaminophen (TYLENOL) tablet 650 mg  650 mg Oral At Bedtime PRN Aquilino Crandall MD        calcium carbonate (TUMS) chewable tablet 1,000 mg  1,000 mg Oral 4x Daily PRN Aquilino Crandall MD        guaiFENesin-dextromethorphan (ROBITUSSIN DM) 100-10 MG/5ML syrup 10 mL  10 mL Oral 4x Daily PRN Aquilino Crandall MD        lidocaine (LMX4) cream   Topical Q1H PRN Aquilino Crandall MD        lidocaine 1 % 0.1-1 mL  0.1-1 mL Other Q1H PRN Aquilino Crandall MD        ramelteon (ROZEREM) half-tab 4 mg  4 mg Oral At Bedtime PRN Aquilino Crandall MD        senna-docusate (SENOKOT-S/PERICOLACE) 8.6-50 MG per tablet 1 tablet  1 tablet Oral BID PRN Aquilino Crandall MD        Or    senna-docusate (SENOKOT-S/PERICOLACE) 8.6-50 MG per tablet 2 tablet  2 tablet Oral BID PRN Aquilino Crandall MD        sodium chloride (PF) 0.9% PF flush 3 mL  3 mL Intracatheter q1 min  nancy Crandall, Aquilino Guajardo MD

## 2025-01-07 NOTE — H&P
[facility]    History and Physical - Hospitalist Service       Date of Admission:  1/6/2025    Assessment & Plan      Janet Cope is a 89 year old female admitted on 1/6/2025. She Janet Cope is a 89 year old female with past medical history significant for daily alcohol use, diabetes mellitus type 2 who presents with cough, shortness of breath, headache, poor intake. Admitted on 12/21/2024 to 12/28/24   For influenza A and superimposed bacterial pneumonia with acute hypoxemic respiratory failure.    # Acute on chronic anemia  # Acute blood loss anemia  #  rectal bleeding  *Hemoglobin has been stable around 9 during the last hospitalization and currently down to 7.2  -Etiology likely bleeding hemorrhoids vs lower GI bleed  -Admit as inpatient  -Being transfused 1 unit of blood in then ER  -Will give her IV Lasix 20 mg after transfusion as she developed volume overload during her last hospitalization  - GI consult  -Colorectal surgery consult  -Might benefit from IV iron infusions  -Npo after midnight         # Recent influenza A and superimposed community-acquired pneumonia  Admitted on 12/21/2024 to 12/28/24   For influenza A and superimposed bacterial pneumonia with acute hypoxemic respiratory failure.  -Currently on room air        #  Insomnia  # Anxiety   -Patient was having insomnia during hospitalization and was given low-dose ramelteon which had for sleep which helped her sleep  -Continue ramelteon  -Busprirone increased by her PCP to 15mg bid from 7.5  -Continue Lexapro 10mg       Diabetes mellitus, type 2, diet-controlled   HgbA1c 5.9% 9/28/24. Glucose adequately controlled on BMP.   - HgbA1c  -Low-dose sliding scale  -POC ACH S    Alcohol use disorder  *Patient used to drink 1 box every 3 days of wine  As per patient and family since last hospitalization she has been drinking moderately and she had 1 cocktail this morning  -Monitor for CIWA  -Continue oral  "thiamine      Cardiac murmur  # Likely heart failure  -Patient  and family refused any further workup including echocardiogram  -Continue PTA Lasix              chronic kidney disease, stage 3  Baseline creatinine 0.9-1.1.   -Creatinine 1.12 on admission       Hypertension  -Continue losartan  -Continue amlodipine      Depression  Anxiety  - Continue prior to admission escitalopram, buspirone when    # Legally blind  Noted     GERD  - Continue prior to admission PPI         Hyperlipidemia  - Continue prior to admission atorvastatin        # Ear Blister resolved      # Goals of care  -Patient does not want any aggressive interventions and her goal is mainly comfort.  Patient wants to discuss with GI team and colorectal surgery in the morning about further management    Diet: NPO per Anesthesia Guidelines for Procedure/Surgery Except for: Meds  DVT Prophylaxis: Pneumatic Compression Devices  Arroyo Catheter: Not present  Lines: None     Cardiac Monitoring: None  Code Status: No CPR- Do NOT Intubate    Clinically Significant Risk Factors Present on Admission                   # Hypertension: Noted on problem list      # Anemia: based on hgb <11  # Anemia: based on hgb <11       # Obesity: Estimated body mass index is 32.1 kg/m  as calculated from the following:    Height as of an earlier encounter on 1/6/25: 1.626 m (5' 4\").    Weight as of an earlier encounter on 1/6/25: 84.8 kg (187 lb).              Disposition Plan     Medically Ready for Discharge: Anticipated in 2-4 Days           Aquilino Crandall MD  Hospitalist Service  [facility]  Securely message with [employer] (more info)  Text page via [employer] Paging/Directory     ______________________________________________________________________    Chief Complaint       History is obtained from the patient    History of Present Illness   Janet Cope is a 89 year old female who vicky Cope is a 89 year old female with past " medical history significant for daily alcohol use, diabetes mellitus type 2 who presents with cough, shortness of breath, headache, poor intake. Admitted on 12/21/2024.   Admitted on 12/21/2024 to 12/28/24   For influenza A and superimposed bacterial pneumonia with acute hypoxemic respiratory failure.    Patient was in her usual state of health when she noticed she was having increased rectal bleeding.  Patient also noticed fatigue    Denies any chest pain or shortness of breath or abdominal pain.      Patient did see her primary care provider-hemoglobin was low at 7.2 and was asked to go to the ER    Past Medical History    Past Medical History:   Diagnosis Date    Anxiety     Basal cell carcinoma     CKD (chronic kidney disease) stage 3, GFR 30-59 ml/min (H)     Depression     Diverticulosis of colon (without mention of hemorrhage) 8/02    colonoscopy negative other than sigmoid diverticulosis    Enthesopathy of hip region     Bilateral    Essential hypertension, benign     Generalized osteoarthrosis, unspecified site     Macular degeneration     Macular degeneration, dry     Osteoporosis, unspecified     Other specified gastritis without mention of hemorrhage     Personal history of colonic polyps 7/03     had hyperplastic polyps 2013    Personal history of malignant neoplasm of breast 1995 & 9/01    left breast cancer (1995), right breast cancer (9/01); patient sees Dr. Martinez regularly    Postmenopausal bleeding 5/02    endometrial biopsies negative; s/p D & C    Type 2 diabetes mellitus with diabetic chronic kidney disease  (goal A1C<7) 10/24/2015    Unspecified hemorrhoids without mention of complication 10/05    Internal, s/p banding ligature       Past Surgical History   Past Surgical History:   Procedure Laterality Date    BREAST LUMPECTOMY, RT/LT  1995    Breast Lumpectomy RT    BREAST LUMPECTOMY, RT/LT  09/2001    left    BREAST RADIATION TX RT/LT  1995    right     BREAST RADIATION TX RT/LT  09/2001     left    HYSTERECTOMY, VAGINAL      STRESS ECHO (METRO)  04/2002    normal       Prior to Admission Medications   Prior to Admission Medications   Prescriptions Last Dose Informant Patient Reported? Taking?   acetaminophen (TYLENOL) 650 MG CR tablet 1/6/2025  Yes Yes   Sig: Take 1 tablet (650 mg) by mouth nightly as needed for mild pain or fever   amLODIPine (NORVASC) 10 MG tablet 1/5/2025  No Yes   Sig: TAKE 1 TABLET(10 MG) BY MOUTH DAILY IN THE EVENING   Patient taking differently: Take 10 mg by mouth every evening.   atorvastatin (LIPITOR) 20 MG tablet 1/6/2025  No Yes   Sig: TAKE 1 TABLET(20 MG) BY MOUTH DAILY   busPIRone (BUSPAR) 15 MG tablet 1/6/2025 Morning  No Yes   Sig: Take 1 tablet (15 mg) by mouth 2 times daily. For anxiety   escitalopram (LEXAPRO) 10 MG tablet 1/6/2025  No Yes   Sig: TAKE 1 TABLET(10 MG) BY MOUTH DAILY   Patient taking differently: Take 10 mg by mouth daily.   furosemide (LASIX) 20 MG tablet   No No   Sig: Take 1 tablet (20 mg) by mouth daily.   losartan (COZAAR) 100 MG tablet 1/6/2025  No Yes   Sig: Take 1 tablet (100 mg) by mouth daily.   omeprazole (PRILOSEC) 20 MG DR capsule 1/6/2025 Morning  No Yes   Sig: TAKE 1 CAPSULE(20 MG) BY MOUTH TWICE DAILY   Patient taking differently: Take 20 mg by mouth 2 times daily. TAKE 1 CAPSULE(20 MG) BY MOUTH TWICE DAILY      Facility-Administered Medications: None        Review of Systems         Physical Exam   Vital Signs: Temp: 97.5  F (36.4  C) Temp src: Oral BP: (!) 147/67 Pulse: 96   Resp: 16 SpO2: 95 % O2 Device: None (Room air)    Weight: 0 lbs 0 oz    Physical Exam  Cardiovascular:      Rate and Rhythm: Normal rate and regular rhythm.      Heart sounds: Normal heart sounds.   Pulmonary:      Effort: Pulmonary effort is normal. No respiratory distress.      Breath sounds: Normal breath sounds.   Abdominal:      General: There is no distension.      Palpations: Abdomen is soft.      Tenderness: There is no abdominal tenderness.    Musculoskeletal:      Comments: Trace edema in bilateral lower extremities          Medical Decision Making       70 MINUTES SPENT BY ME on the date of service doing chart review, history, exam, documentation & further activities per the note.      Data     I have personally reviewed the following data over the past 24 hrs:    8.7  \   7.2 (L)   / 436     136 104 12.0 /  123 (H)   3.9 22 1.12 (H) \     ALT: 16 AST: 32 AP: 69 TBILI: 0.4   ALB: 3.5 TOT PROTEIN: 7.6 LIPASE: N/A       Imaging results reviewed over the past 24 hrs:   Recent Results (from the past 24 hours)   XR Chest 2 Views    Narrative    EXAM: XR CHEST 2 VIEWS  LOCATION: [facility]  DATE: 1/6/2025    INDICATION: Follow-up heart failure and question infiltrates.  COMPARISON: 12/22/2024      Impression    IMPRESSION: Similar interstitial changes compared to previous given the rotated view. No effusions.

## 2025-01-07 NOTE — TELEPHONE ENCOUNTER
See further chart notes. I had spoken with pt's daughter Mitzi earlier yesterday after pt's appt and when I saw this further message, pt was already in the ER as I had instructed family to take her there if willing to be hospitalized and pt now admitted

## 2025-01-07 NOTE — CONSULTS
[facility]  Gastroenterology Consultation         Janet Cope  [address]   [facility] [address]  89 year old female    Admission Date/Time: 1/6/2025  Primary Care Provider: Frank Kovacs  Referring / Attending Physician:  Dr. Crandall    We were asked to see the patient in consultation by Dr. Crandall for evaluation of [place] on chronic anemia with rectal bleeding.      CC: Rectal bleeding    HPI:  Janet Cope is a 89 year old female with history including DM2; HTN; DLD; depression/anxiety; GERD; alcohol use; and recent hospitalization (12/21-12/28/2024) for issues including influenza A and superimposed bacterial pneumonia with acute hypoxemic respiratory failure. She presented 1/6/2025 with rectal bleeding.     Patient was in her usual state of health when she noticed she was having increased rectal bleeding.  Patient also noticed fatigue.  Denies any chest pain or shortness of breath or abdominal pain.  This morning, she reports only small amounts of blood on the toilet paper and no real bloody BMs.  Consistent with hemorrhoidal bleeding she has had in the past.  CRS has been consulted.  Discussed possibility of GI bleed from source other than hemorrhoid.  Hgb is stable post transfusion.  She martinez not wish to pursue colonoscopy at this time, which is reasonable.        Patient did see her primary care provider-hemoglobin was low at 7.2 and was asked to go to the ER    ROS: A comprehensive ten point review of systems was negative aside from those in mentioned in the HPI.      PAST MED HX:  I have reviewed this patient's medical history and updated it with pertinent information if needed.   Past Medical History:   Diagnosis Date    Anxiety     Basal cell carcinoma     CKD (chronic kidney disease) stage 3, GFR 30-59 ml/min (H)     Depression     Diverticulosis of colon (without mention of hemorrhage) 8/02    colonoscopy negative other than sigmoid diverticulosis     Enthesopathy of hip region     Bilateral    Essential hypertension, benign     Generalized osteoarthrosis, unspecified site     Macular degeneration     Macular degeneration, dry     Osteoporosis, unspecified     Other specified gastritis without mention of hemorrhage     Personal history of colonic polyps 7/03     had hyperplastic polyps 2013    Personal history of malignant neoplasm of breast 1995 & 9/01    left breast cancer (1995), right breast cancer (9/01); patient sees Dr. Martinez regularly    Postmenopausal bleeding 5/02    endometrial biopsies negative; s/p D & C    Type 2 diabetes mellitus with diabetic chronic kidney disease  (goal A1C<7) 10/24/2015    Unspecified hemorrhoids without mention of complication 10/05    Internal, s/p banding ligature       MEDICATIONS:   Prior to Admission Medications   Prescriptions Last Dose Informant Patient Reported? Taking?   acetaminophen (TYLENOL) 650 MG CR tablet 1/6/2025  Yes Yes   Sig: Take 1 tablet (650 mg) by mouth nightly as needed for mild pain or fever   amLODIPine (NORVASC) 10 MG tablet 1/5/2025  No Yes   Sig: TAKE 1 TABLET(10 MG) BY MOUTH DAILY IN THE EVENING   Patient taking differently: Take 10 mg by mouth every evening.   atorvastatin (LIPITOR) 20 MG tablet 1/6/2025  No Yes   Sig: TAKE 1 TABLET(20 MG) BY MOUTH DAILY   busPIRone (BUSPAR) 15 MG tablet 1/6/2025 Morning  No Yes   Sig: Take 1 tablet (15 mg) by mouth 2 times daily. For anxiety   escitalopram (LEXAPRO) 10 MG tablet 1/6/2025  No Yes   Sig: TAKE 1 TABLET(10 MG) BY MOUTH DAILY   Patient taking differently: Take 10 mg by mouth daily.   furosemide (LASIX) 20 MG tablet   No No   Sig: Take 1 tablet (20 mg) by mouth daily.   losartan (COZAAR) 100 MG tablet 1/6/2025  No Yes   Sig: Take 1 tablet (100 mg) by mouth daily.   omeprazole (PRILOSEC) 20 MG DR capsule 1/6/2025 Morning  No Yes   Sig: TAKE 1 CAPSULE(20 MG) BY MOUTH TWICE DAILY   Patient taking differently: Take 20 mg by mouth 2 times daily. TAKE 1  CAPSULE(20 MG) BY MOUTH TWICE DAILY      Facility-Administered Medications: None       ALLERGIES:   Allergies   Allergen Reactions    Aromasin [Exemestane]      Muscle weakness, pain in legs    Lisinopril      Cough and Dizziness.       SOCIAL HISTORY:  Social History     Tobacco Use    Smoking status: Former     Current packs/day: 0.00     Types: Cigarettes     Quit date: 10/10/1981     Years since quittin.2    Smokeless tobacco: Never   Vaping Use    Vaping status: Never Used   Substance Use Topics    Alcohol use: Yes     Alcohol/week: 21.0 - 28.0 standard drinks of alcohol     Types: 21 - 28 Glasses of wine per week     Comment: 3-4 glass of wine daily    Drug use: No       FAMILY HISTORY:  Family History   Problem Relation Age of Onset    Diabetes Mother             Heart Disease Mother        PHYSICAL EXAM:   Vital Signs with Ranges  Temp: 97.5  F (36.4  C) Temp src: Oral BP: (!) 143/68 Pulse: 95   Resp: 16 SpO2: 96 % O2 Device: None (Room air)    I/O last 3 completed shifts:  In: 10 [I.V.:10]  Out: -     Constitutional: Alert, oriented to person, place, date, situation.  Cooperative, sitting up in NAD.   Respiratory:  Lungs CTA, no labored breathing.  Cardiovascular:  Heart RRR  GI:  Abdomen soft, NT/ND  Skin/Integumen:  Warm, dry, non-diaphoretic.  MSK: CMS x4 intact.      ADDITIONAL COMMENTS:   I reviewed the patient's new clinical lab test results.   Recent Labs   Lab Test 25  0333 25  1608 25  1057 24  0855   WBC  --  8.7 7.2 11.8*   HGB 8.4* 7.2* 7.5* 9.1*   MCV  --  84 82 82   PLT  --  436 461* 460*     Recent Labs   Lab Test 25  1608 25  1057 24  0855   POTASSIUM 3.9 4.8 3.5   CHLORIDE 104 106 102   CO2 22 22 21*   BUN 12.0 13.0 13.1   ANIONGAP 10 12 14     Recent Labs   Lab Test 25  1057 24  0916 24  0857 23  1801 10/08/19  0738 19  0831   ALBUMIN 3.5 3.9 4.2  --    < >  --    BILITOTAL 0.4 0.5 0.5  --    < >  --     ALT 16 26 30  --    < >  --    AST 32 47* 54*  --    < >  --    PROTEIN  --   --   --  30*  --  Trace*    < > = values in this interval not displayed.       I reviewed the patient's new imaging results.        CONSULTATION ASSESSMENT AND PLAN:    Janet Cope is a 89 year old female with past medical history significant for daily alcohol use, diabetes mellitus type 2 who presents with cough, shortness of breath, headache, poor intake. Admitted on 12/21/2024 to 12/28/24 for influenza A and superimposed bacterial pneumonia with acute hypoxemic respiratory failure.     # Acute on chronic anemia  # Acute blood loss anemia  # Rectal bleeding  *Hemoglobin has been stable around 9 during the last hospitalization and on presentation was down to 7.2.  -H/O hemorrhoids.  -Etiology likely bleeding hemorrhoids vs lower GI bleed  -Transfused 1 unit of blood, Hgb 7.2--7.5  -Supportive care  -Serial Hgb   -Transfuse for Hgb <7  -CRS consulted for likely recurrent hemorrhoidal bleeding  -Ok to advance diet pending CRS input  -We appreciate the consult and will follow    Recent Labs   Lab 01/07/25  0333 01/06/25  1608 01/06/25  1057   HGB 8.4* 7.2* 7.5*       [facility].  Office: [phone]  Cell: [phone] (Dr. Torres)

## 2025-01-07 NOTE — PROGRESS NOTES
RECEIVING UNIT ED HANDOFF REVIEW    ED Nurse Handoff Report was reviewed by: Brandyn Emery RN on January 6, 2025 at 9:45 PM.

## 2025-01-07 NOTE — ED NOTES
[facility]  ED Nurse Handoff Report    ED Chief complaint: Hemorrhoids and Rectal Bleeding      ED Diagnosis:   Final diagnoses:   Rectal bleeding   Acute on chronic anemia       Code Status:  Admitting MD to assess.      Allergies:   Allergies   Allergen Reactions    Aromasin [Exemestane]      Muscle weakness, pain in legs    Lisinopril      Cough and Dizziness.       Patient Story: Pt  was recently in the hospital for influenza A. Pt  is doing better but now has bleeding hemorrhoids.  had blood when she wipes, not so much when she defecates      Focused Assessment:  Patient is alert and oriented x 4, calm and cooperative. VS are stable, skin is warm and dry, respirations are even and non-labored on room air. Patient denied chest pain, shortness of breath, dizziness, and nausea.       Treatments and/or interventions provided: Medications - No data to display    Patient's response to treatments and/or interventions: Stable    To be done/followed up on inpatient unit:  Monitor    Does this patient have any cognitive concerns?:  None    Activity level - Baseline/Home:  Unknown  Activity Level - Current:   Stand with Assist    Patient's Preferred language: English   Needed?: No    Isolation: None  Infection: Not Applicable  Patient tested for COVID 19 prior to admission: NO  Bariatric?: No    Vital Signs:   Vitals:    01/06/25 1506 01/06/25 1730   BP: 126/60    Pulse: 98    Resp: 16    Temp: 98.2  F (36.8  C)    TempSrc: Oral    SpO2: 93% 94%       Cardiac Rhythm:     Was the PSS-3 completed:   Yes  What interventions are required if any?               Family Comments: Daughter is at the bedside  OBS brochure/video discussed/provided to patient/family: Yes              Name of person given brochure if not patient: NA              Relationship to patient: NA    For the majority of the shift this patient's behavior was Green.   Behavioral interventions performed were   information and reassurance provided.  .    ED NURSE PHONE NUMBER: [phone]

## 2025-01-07 NOTE — CONSULTS
SPIRITUAL HEALTH SERVICES - Consult Note  [facility] MedSurg 88    Referral Source/Reason for Visit: staff referral for emotional support    On this visit with pt Janet, two of her four daughters were present at bedside.    Janet shared that she's made the decision to forego medical interventions and has the desire to discharge back home to the apartment she shares with one of her daughters.    Her daughters voiced desiring to honor their mother's decision while also acknowledging their sadness at the prospect of considering Janet's death. Janet shared that her  of 69 years  two years ago.    Janet engaged in life review, all the while, naming that she's lived a good life and reflecting on the goodness she's experienced along the way.    She is connected to [place] and declined outreach to the Oriental orth[employer] at this time, voicing that she'd prefer to take care of contacting them.    She shares that her chris is a source of both comfort and hope for her--both in this life and as she contemplates the life to come. Prayer is an important part of her spiritual practice.    Plan: Janet is aware of  availability. Please contact as needs arise.    Romy Gibson  Associate     [facility] available  for emergent requests/referrals, either by paging the on-call  or by entering an ASAP/STAT consult in Epic (this will also page the on-call ).

## 2025-01-08 ENCOUNTER — MEDICAL CORRESPONDENCE (OUTPATIENT)
Dept: HEALTH INFORMATION MANAGEMENT | Facility: CLINIC | Age: OVER 89
End: 2025-01-08

## 2025-01-08 ENCOUNTER — DOCUMENTATION ONLY (OUTPATIENT)
Dept: OTHER | Facility: CLINIC | Age: OVER 89
End: 2025-01-08
Payer: MEDICARE

## 2025-01-08 VITALS
TEMPERATURE: 97.8 F | RESPIRATION RATE: 16 BRPM | SYSTOLIC BLOOD PRESSURE: 109 MMHG | OXYGEN SATURATION: 96 % | DIASTOLIC BLOOD PRESSURE: 53 MMHG | HEART RATE: 85 BPM

## 2025-01-08 LAB
ANION GAP SERPL CALCULATED.3IONS-SCNC: 13 MMOL/L (ref 7–15)
BASOPHILS # BLD AUTO: 0 10E3/UL (ref 0–0.2)
BASOPHILS NFR BLD AUTO: 0 %
BUN SERPL-MCNC: 16.6 MG/DL (ref 8–23)
CALCIUM SERPL-MCNC: 8.7 MG/DL (ref 8.8–10.4)
CHLORIDE SERPL-SCNC: 102 MMOL/L (ref 98–107)
CREAT SERPL-MCNC: 2.16 MG/DL (ref 0.51–0.95)
EGFRCR SERPLBLD CKD-EPI 2021: 21 ML/MIN/1.73M2
EOSINOPHIL # BLD AUTO: 0.1 10E3/UL (ref 0–0.7)
EOSINOPHIL NFR BLD AUTO: 1 %
ERYTHROCYTE [DISTWIDTH] IN BLOOD BY AUTOMATED COUNT: 18.3 % (ref 10–15)
GLUCOSE SERPL-MCNC: 132 MG/DL (ref 70–99)
HCO3 SERPL-SCNC: 22 MMOL/L (ref 22–29)
HCT VFR BLD AUTO: 25.4 % (ref 35–47)
HGB BLD-MCNC: 7.8 G/DL (ref 11.7–15.7)
IMM GRANULOCYTES # BLD: 0 10E3/UL
IMM GRANULOCYTES NFR BLD: 1 %
LYMPHOCYTES # BLD AUTO: 1.1 10E3/UL (ref 0.8–5.3)
LYMPHOCYTES NFR BLD AUTO: 15 %
MCH RBC QN AUTO: 26.1 PG (ref 26.5–33)
MCHC RBC AUTO-ENTMCNC: 30.7 G/DL (ref 31.5–36.5)
MCV RBC AUTO: 85 FL (ref 78–100)
MONOCYTES # BLD AUTO: 0.7 10E3/UL (ref 0–1.3)
MONOCYTES NFR BLD AUTO: 9 %
NEUTROPHILS # BLD AUTO: 5.7 10E3/UL (ref 1.6–8.3)
NEUTROPHILS NFR BLD AUTO: 74 %
NRBC # BLD AUTO: 0 10E3/UL
NRBC BLD AUTO-RTO: 0 /100
PLATELET # BLD AUTO: 381 10E3/UL (ref 150–450)
POTASSIUM SERPL-SCNC: 3.7 MMOL/L (ref 3.4–5.3)
RBC # BLD AUTO: 2.99 10E6/UL (ref 3.8–5.2)
SODIUM SERPL-SCNC: 137 MMOL/L (ref 135–145)
WBC # BLD AUTO: 7.7 10E3/UL (ref 4–11)

## 2025-01-08 PROCEDURE — 250N000013 HC RX MED GY IP 250 OP 250 PS 637: Performed by: STUDENT IN AN ORGANIZED HEALTH CARE EDUCATION/TRAINING PROGRAM

## 2025-01-08 PROCEDURE — 36415 COLL VENOUS BLD VENIPUNCTURE: CPT | Performed by: INTERNAL MEDICINE

## 2025-01-08 PROCEDURE — 99238 HOSP IP/OBS DSCHRG MGMT 30/<: CPT | Performed by: INTERNAL MEDICINE

## 2025-01-08 PROCEDURE — 99222 1ST HOSP IP/OBS MODERATE 55: CPT | Performed by: NURSE PRACTITIONER

## 2025-01-08 PROCEDURE — 80048 BASIC METABOLIC PNL TOTAL CA: CPT | Performed by: INTERNAL MEDICINE

## 2025-01-08 PROCEDURE — 85004 AUTOMATED DIFF WBC COUNT: CPT | Performed by: INTERNAL MEDICINE

## 2025-01-08 PROCEDURE — 250N000013 HC RX MED GY IP 250 OP 250 PS 637

## 2025-01-08 RX ADMIN — LOSARTAN POTASSIUM 100 MG: 100 TABLET, FILM COATED ORAL at 08:35

## 2025-01-08 RX ADMIN — BUSPIRONE HYDROCHLORIDE 15 MG: 10 TABLET ORAL at 08:35

## 2025-01-08 RX ADMIN — PANTOPRAZOLE SODIUM 40 MG: 40 TABLET, DELAYED RELEASE ORAL at 06:13

## 2025-01-08 RX ADMIN — ESCITALOPRAM OXALATE 10 MG: 10 TABLET ORAL at 08:35

## 2025-01-08 RX ADMIN — PSYLLIUM HUSK 1 PACKET: 3.4 POWDER ORAL at 08:35

## 2025-01-08 RX ADMIN — Medication 1 TABLET: at 08:35

## 2025-01-08 RX ADMIN — ATORVASTATIN CALCIUM 20 MG: 20 TABLET, FILM COATED ORAL at 08:35

## 2025-01-08 ASSESSMENT — ACTIVITIES OF DAILY LIVING (ADL)
ADLS_ACUITY_SCORE: 60
ADLS_ACUITY_SCORE: 60
ADLS_ACUITY_SCORE: 61
ADLS_ACUITY_SCORE: 60
ADLS_ACUITY_SCORE: 60
ADLS_ACUITY_SCORE: 61
ADLS_ACUITY_SCORE: 60
ADLS_ACUITY_SCORE: 61
ADLS_ACUITY_SCORE: 60
ADLS_ACUITY_SCORE: 61
ADLS_ACUITY_SCORE: 60

## 2025-01-08 NOTE — PROGRESS NOTES
[facility]  Gastroenterology Progress Note     Janet Cope MRN# [id number]   YOB: 1934 Age: 89 year old          Assessment and Plan:   Janet Cope is a 89 year old female with past medical history significant for daily alcohol use, diabetes mellitus type 2 who presents with cough, shortness of breath, headache, poor intake. Admitted on 12/21/2024 to 12/28/24 for influenza A and superimposed bacterial pneumonia with acute hypoxemic respiratory failure.     # Acute on chronic anemia  # Acute blood loss anemia  # Rectal bleeding  *Hemoglobin has been stable around 9 during the last hospitalization and on presentation was down to 7.2.  -H/O hemorrhoids.  -Etiology likely bleeding hemorrhoids vs lower GI bleed  -Transfused 1 unit of blood  -Supportive care  -Serial Hgb   -Transfuse for Hgb <7  -CRS consulted for likely recurrent hemorrhoidal bleeding.  Input appreciated.  Recommend starting metamucil, iron with vit C, and conservative management.  -Regular diet  -Pt still having some blood on the toilet paper with BM.  No pain.  Recommendations reviewed.  She wishes to manage conservatively which is appropriate with stable Hgb.  -GI will sign off, please call with questions or concerns.     Recent Labs   Lab 01/08/25  0825 01/07/25  2011 01/07/25  1207 01/07/25  0333 01/06/25  1608   HGB 7.8* 7.4* 7.6*  7.6* 8.4* 7.2*           Interval History:     doing well; no cp, sob, n/v/d, or abd pain.              Review of Systems:     C: NEGATIVE for fever, chills, change in weight  E/M: NEGATIVE for ear, mouth and throat problems  R: NEGATIVE for significant cough or SOB  CV: NEGATIVE for chest pain, palpitations or peripheral edema             Medications:   I have reviewed this patient's current medications  Current Facility-Administered Medications   Medication Dose Route Frequency Provider Last Rate Last Admin    amLODIPine (NORVASC) tablet 10 mg  10 mg Oral QPM  Aquilino Crandall MD   10 mg at 01/07/25 1953    atorvastatin (LIPITOR) tablet 20 mg  20 mg Oral Daily Aquilino Crandall MD   20 mg at 01/08/25 0835    busPIRone (BUSPAR) tablet 15 mg  15 mg Oral BID Aquilino Crandall MD   15 mg at 01/08/25 0835    Elemental iron 65 mg Vitamin C 125 mg (VITRON C) tablet 1 tablet  1 tablet Oral Daily Martha Torres PA-C   1 tablet at 01/08/25 0835    escitalopram (LEXAPRO) tablet 10 mg  10 mg Oral Daily Aquilino Crandall MD   10 mg at 01/08/25 0835    [Held by provider] furosemide (LASIX) tablet 20 mg  20 mg Oral Daily Aquilino Crandall MD        losartan (COZAAR) tablet 100 mg  100 mg Oral Daily Aquilino Crandall MD   100 mg at 01/08/25 0835    pantoprazole (PROTONIX) EC tablet 40 mg  40 mg Oral BID Aquilino Crandall MD   40 mg at 01/08/25 0613    psyllium (METAMUCIL/KONSYL) Packet 1 packet  1 packet Oral Daily Martha Torres PA-C   1 packet at 01/08/25 0835    sodium chloride (PF) 0.9% PF flush 3 mL  3 mL Intracatheter Q8H Aquilino Crandall MD   3 mL at 01/08/25 0441                  Physical Exam:   Vitals were reviewed  Vital Signs with Ranges  Temp:  [97.2  F (36.2  C)-97.8  F (36.6  C)] 97.8  F (36.6  C)  Pulse:  [85-93] 85  Resp:  [16] 16  BP: ()/(45-70) 109/53  SpO2:  [93 %-96 %] 96 %  No intake/output data recorded.  Constitutional: Alert, oriented to person, place, situation. Lying in bed in NAD.   Respiratory:  No labored breathing.  Cardiovascular:  Heart RRR  GI:  Abdomen soft, NT/ND   Skin/Integumen:  Warm, dry, non-diaphoretic.             Data:   I reviewed the patient's new clinical lab test results.   Recent Labs   Lab Test 01/08/25  0825 01/07/25 2011 01/07/25  1207 01/07/25  0333 01/06/25  1608   WBC 7.7  --  6.6  --  8.7   HGB 7.8* 7.4* 7.6*  7.6*   < > 7.2*   MCV 85  --  84  --  84     --  378  --  436    < > = values in this interval not displayed.     Recent  Labs   Lab Test 01/07/25  1207 01/06/25  1608 01/06/25  1057   POTASSIUM 3.7 3.9 4.8   CHLORIDE 105 104 106   CO2 22 22 22   BUN 12.0 12.0 13.0   ANIONGAP 12 10 12     Recent Labs   Lab Test 01/06/25  1057 09/28/24  0916 05/23/24  0857 12/13/23  1801 10/08/19  0738 05/23/19  0831   ALBUMIN 3.5 3.9 4.2  --    < >  --    BILITOTAL 0.4 0.5 0.5  --    < >  --    ALT 16 26 30  --    < >  --    AST 32 47* 54*  --    < >  --    PROTEIN  --   --   --  30*  --  Trace*    < > = values in this interval not displayed.       I reviewed the patient's new imaging results.    All laboratory data reviewed  All imaging studies reviewed by me.    ALYCE Donahue,  1/8/2025  [facility]  Office : [phone]  Cell: [phone] (Dr. Torres)

## 2025-01-08 NOTE — PLAN OF CARE
Goal Outcome Evaluation:      Plan of Care Reviewed With: patient, child    Overall Patient Progress: no changeOverall Patient Progress: no change     [id number]  Discharge Note    Patient discharged to home with services via private vehicle  accompanied by other family  .  IV: Discontinued  Prescriptions  Refused .   Belongings reviewed and sent with patient and family.   Home medications returned to patient: NA  Equipment sent with: patient, N/A.   patient and family verbalizes understanding of discharge instructions. AVS given to patient and family.    Family refused discharge meds, transported pt in owned wheelchair to [facility] for vehicle transportation home. IV's removed

## 2025-01-08 NOTE — PLAN OF CARE
Orientation: A&Ox4   Aggression Stop Light: Green   Mobility: SBA  Pain Management: Denies pain   Diet: Regular   Bowel/Bladder: Continent of B/B, Last BM 1/7/25 per report   Abnormal Lab/Assessments: Hgb 7.4, Q12 recheck is pending.   Drain/Device/Wound: Skin is pale, but no obvious wounds    Consults: GI, Colorectal Surgery, Spiritual Health, Palliative, SW.    Disposition: Pending, need to determine GOC    Other info:  -Pt initially requested her PRN Rozerem to help her fall asleep this shift. Requested from Pharmacy. When writer went to give this med, pt was already sleeping. Not given, and currently in her Bin.   -Daughter at bedside overnight and in to today

## 2025-01-08 NOTE — PROGRESS NOTES
Care Management Follow Up    Length of Stay (days): 2    Expected Discharge Date: 01/10/2025     Concerns to be Addressed:  Hospice     Patient plan of care discussed at interdisciplinary rounds: Yes    Anticipated Discharge Disposition:     hospice           Anticipated Discharge Services:    Anticipated Discharge DME:  N?A    Patient/family educated on Medicare website which has current facility and service quality ratings:    Education Provided on the Discharge Plan:    Patient/Family in Agreement with the Plan:      Referrals Placed by CM/SW:  Hospice referral  Private pay costs discussed: Not applicable    Discussed  Partnership in Safe Discharge Planning  document with patient/family: No     Handoff Completed: No, handoff not indicated or clinically appropriate    Additional Information:  Updated from Beulah with SW and Zuleima with palliative that family would like to go home with hospice. Beulah stated family wanted [facility] or [facility]. Referral sent via [facility] to [facility]. Call placed to intake with [facility] and spoke with Stephanie. Stephanie confirmed that they can accept today and will update writer once a time is known for admission. Paged  for orders since family want to take patient home today. Floor Sw will continue to follow     Next Steps: Confirm admission time    TRENT Fontana, LICSW  Social Work- Inpatient Care Coordination  [facility]

## 2025-01-08 NOTE — PROGRESS NOTES
MD Notification    Notified Person: MD    Notified Person Name: Shaq England     Notification Date/Time: 1/7/25 @ 1348    Notification Interaction: Vocera     Purpose of Notification: Call Niece and pt requesting palliative     Orders Received: Palliative consult placed     Comments:

## 2025-01-08 NOTE — PLAN OF CARE
Goal Outcome Evaluation:    Orientation: A&Ox4   Aggression Stop Light: Green   Mobility: SBA  Pain Management: Denies pain   Diet: Regular diet- appetite okay   Bowel/Bladder: Continent b/b; no BM this shift.   Abnormal Lab/Assessments: Hgb 7.6  Drain/Device/Wound: Skin WNL   Consults: GI, colorectal, Spiritual health, Palliative   D/C Day/Goals/Place: Pending     Shift Note:   -[facility]. Pt would like to discharge home w/o treatment. Pt requested palliative consult to discuss discontinuing cares. Pt verbalized sadness with test results and does not believe they can realistically complete any further diagnostic testing.

## 2025-01-08 NOTE — CONSULTS
Palliative Care Consultation Note  [facility]      Patient: Janet Cope  Date of Admission:  1/6/2025    Requesting Clinician / Team: Dr. England/hospitalist   Reason for consult: Goals of care     Recommendations & Counseling     GOALS OF CARE:  Comfort focused - Janet and family want to discharge home today and enroll in hospice. They are ok with going home today, even if she cannot sign into hospice until tomorrow   SW consult for hospice planning     ADVANCE CARE PLANNING:  Patient has an advance directive dated 5/2023.  Primary Health Care Agent granddaughter Kristi Howard.  Alternate(s) daughter Xena Cope.   There is a POLST form on file, this was reviewed and current.  Code status: No CPR- Do NOT Intubate    MEDICAL MANAGEMENT:  Discharge home on current PTA medications, per pt/family request. Enroll in hospice upon discharge home     PSYCHOSOCIAL/SPIRITUAL SUPPORT:  Family -4 supportive daughters, granddaughter can care for Janet at home with hospice    Palliative Care will sign off. Thank you for the consult and allowing us to aid in the care of Janet Cope.    These recommendations have been discussed with Dr. England, unit [facility].    Chart documentation was completed, in part, with [employer] voice-recognition software. Even though reviewed, some grammatical, spelling, and word errors may remain.     DESTINY Cote CNP  Securely message with [employer] (more info)  Text page via [facility] Paging/Directory     Palliative Summary/HPI     Janet Cope is a 89 year old female with a past medical history significant for DM2, HTN, DLD, depression/anxiety, GERD, alcohol use, and recent hospitalization (12/21-12/28/2024) with acute hypoxic respiratory failure 2/2 influenza A and superimposed bacterial pneumonia who now presents with rectal bleeding and acute blood loss anemia.    Today, the patient was seen for:  Goals of care     Palliative Care Summary:   Met with  Janet, along with daughter Moon in person, granddaughter Kristi via phone.     I introduced our role as an extra layer of support and how we help patients and families dealing with serious, potentially life-limiting illnesses. I explained the composition of the palliative care team.  Palliative care helps patients and families navigate their care while focusing on the whole person; providing emotional, social and spiritual support  Palliative care often assists with symptom management, information sharing about what to expect from the illness, available treatment options and what effect those options may have on the disease course, and provide effective communication and caring support.    Prognosis, Goals, & Planning:    Functional Status just prior to this current hospitalization:  2 hospitalizations in the last month with acute resp failure, now GIB    Prognosis, Goals, and/or Advance Care Planning:  Janet is no longer interested in hospital level interventions. She does not want to seek medical attention for changes in her health. She wants to stay home and be comfortable. She wants to prepare for end of life.   Education provided regarding hospice philosophy, prognostic, and eligibility criteria. Discussed what services are provided and those that are not. Discussed common misconceptions. Discussed the potential to recover/graduate from hospice. We explored the various disposition options where they can receive hospice care (home, residential hospice homes, LTC with hospice) including subsequent financial and familial implications. Discussed typical anticipated timing of discharge, acknowledging that the care management team will assist in specifics.   Based on this discussion, Janet and family want to discharge home today and enroll in hospice. They are ok with going home today, even if she cannot sign into hospice until tomorrow     Code Status was addressed today:   No already DNR/DNI    Patient's  decision making preferences: with input from medical clinicians and loved ones        Patient has decision-making capacity today for complex decisions:Intact          Coping, Meaning, & Spirituality:   Mood, coping, and/or meaning in the context of serious illness were addressed today: Yes    Social:   Living situation:lives alone  Important relationships/caregivers:4 supportive daughters, granddaughter can care for Janet at home with hospice    Medications:  I have reviewed this patient's medication profile and medications from this hospitalization. Notable medications:  Norvasc  Lipitor  Buspar   Vitamin c  Lexapro   PPI    ROS:  Comprehensive ROS is reviewed and is negative except as here & per HPI: N/A    Physical Exam   Vital Signs with Ranges  Temp:  [97.2  F (36.2  C)-97.8  F (36.6  C)] 97.8  F (36.6  C)  Pulse:  [85-92] 85  Resp:  [16] 16  BP: ()/(45-70) 109/53  SpO2:  [93 %-96 %] 96 %  0 lbs 0 oz  CONSTITUTIONAL: Chronically ill elderly woman seen resting in bed in NAD, A&Ox3. Calm and cooperative. Family present   HEENT: NCAT  RESPIRATORY: NL respiratory effort on RA  MUSCULOSKELETAL: Moving freely in bed   NEUROLOGIC: Appropriately responsive during interview  PSYCH: Affect engaged     Data reviewed:  Recent imaging independently reviewed, my comments on pertinents:   1/6 CXR interstitial opacities     Recent lab data independently reviewed, my comments on pertinents:   Na 137  K 3.7  Creat 2.16 up from 1.14  WBC 7.7  Hgb 7.8  Plt 381    Medical Decision Making   Please see A&P for additional details of medical decision making.  MANAGEMENT DISCUSSED with the following over the past 24 hours: Dr. England, unit [facility]   NOTE(S)/MEDICAL RECORDS REVIEWED over the past 24 hours: H&P, hospitalist notes, nursing notes   Tests personally interpreted in the past 24 hours:  - CHEST XRAY showing interstitial opacities  - See lab/imaging results included in the data section of the note  - BMP  -  CBC  SUPPLEMENTAL HISTORY, in addition to the patient's history, over the past 24 hours obtained from:   - Dr. England, unit [facility], dtr Moon, granddaughter Kristi  Medical complexity over the past 24 hours:  - Decision to DE-ESCALATE CARE based on prognosis

## 2025-01-08 NOTE — DISCHARGE SUMMARY
[facility]  Discharge Summary        Janet Cope MRN# [id number]   YOB: 1934 Age: 89 year old     Date of Admission: 1/6/2025  Date of Discharge: 01/08/2025  Admitting Physician: Aquilino Crandall MD  Discharge Physician: Shaq England MD     Primary Provider: Frank Kovacs  Primary Care Physician Phone Number: [phone]         Discharge Diagnoses:   Rectal bleeding, suspect lower from hemorrhoids.  Acute blood loss anemia due to above.  Acute kidney injury, suspect prerenal.  Recent influenza A and superimposed community-acquired pneumonia with respiratory failure.        Other Chronic Medical Problems:      Hypertension (benign essential).  H/o volume overload.  GERD.  Hyperlipidemia/dyslipidemia.  Depression/anxiety.  Previous issues with insomnia.  Alcohol use d/o.  Obesity.       Allergies:         Allergies   Allergen Reactions    Aromasin [Exemestane]      Muscle weakness, pain in legs    Lisinopril      Cough and Dizziness.           Discharge Medications:        Current Discharge Medication List        START taking these medications    Details   Elemental iron 65 mg Vitamin C 125 mg (VITRON C)  MG TABS tablet Take 1 tablet by mouth daily.  Qty: 30 tablet, Refills: 3    Associated Diagnoses: Acute on chronic anemia      psyllium (METAMUCIL/KONSYL) Packet Take 1 packet by mouth daily.  Qty: 100 each, Refills: 3    Associated Diagnoses: Acute on chronic anemia           CONTINUE these medications which have NOT CHANGED    Details   acetaminophen (TYLENOL) 650 MG CR tablet Take 1 tablet (650 mg) by mouth nightly as needed for mild pain or fever    Associated Diagnoses: Cramp of limb      amLODIPine (NORVASC) 10 MG tablet TAKE 1 TABLET(10 MG) BY MOUTH DAILY IN THE EVENING  Qty: 90 tablet, Refills: 0    Associated Diagnoses: Essential hypertension, benign      atorvastatin (LIPITOR) 20 MG tablet TAKE 1 TABLET(20 MG) BY MOUTH DAILY  Qty: 90  tablet, Refills: 2    Associated Diagnoses: Hyperlipidemia LDL goal <100      busPIRone (BUSPAR) 15 MG tablet Take 1 tablet (15 mg) by mouth 2 times daily. For anxiety  Qty: 60 tablet, Refills: 11    Associated Diagnoses: Anxiety      escitalopram (LEXAPRO) 10 MG tablet TAKE 1 TABLET(10 MG) BY MOUTH DAILY  Qty: 90 tablet, Refills: 3    Associated Diagnoses: Mild major depression; Anxiety      omeprazole (PRILOSEC) 20 MG DR capsule TAKE 1 CAPSULE(20 MG) BY MOUTH TWICE DAILY  Qty: 180 capsule, Refills: 1    Associated Diagnoses: Gastroesophageal reflux disease without esophagitis           STOP taking these medications       furosemide (LASIX) 20 MG tablet Comments:   Reason for Stopping:         guaiFENesin-dextromethorphan (ROBITUSSIN DM) 100-10 MG/5ML syrup Comments:   Reason for Stopping:         losartan (COZAAR) 100 MG tablet Comments:   Reason for Stopping:                   Discharge Instructions and Follow-Up:      Discharge Orders      Primary Care - Care Coordination Referral      Home Care Referral      Activity    Your activity upon discharge: activity as tolerated     Follow Up    Follow up with [facility] in [place] in 2-3 weeks to discuss hemorrhoid treatment if desired - [phone].     Reason for your hospital stay    1. Rectal bleeding, suspect lower from hemorrhoids.  2. Acute blood loss anemia due to above.  3. Acute kidney injury, suspect prerenal.  4. Recent influenza A and superimposed community-acquired pneumonia with respiratory failure.     Diet    Follow this diet upon discharge: Orders Placed This Encounter      Regular Diet Adult       Hospital Follow-up with Existing Primary Care Provider (PCP)    Please see details below .  Follow-up as needed if patient/family desires.           Consultations This Hospital Stay:      COLORECTAL SURGERY IP CONSULT  GASTROENTEROLOGY IP CONSULT  SPIRITUAL HEALTH SERVICES IP CONSULT  PALLIATIVE CARE ADULT IP CONSULT  CARE  MANAGEMENT / SOCIAL WORK IP CONSULT  CARE MANAGEMENT / SOCIAL WORK IP CONSULT        Admission History:      Please see the H&P by Aquilino Crandall MD on 1/6/2025 for complete details. Briefly, Janet Cope is a 89 year old female with history including DM2; HTN; DLD; depression/anxiety; GERD; alcohol use; and recent hospitalization (12/21-12/28/2024) for issues including influenza A and superimposed bacterial pneumonia with acute hypoxemic respiratory failure. She presented 1/6/2025 with rectal bleeding.    On initial evaluation, pt was afebrile, normotensive. Labs notable for CBC with WBC normal, hgb 7.2 (hgb 9 range recent hospitalization); BMP with cr 1.12. CXR showed similar interstitial changes compared to previous (12/22/2024) given the rotated view, no effusions.        Problem Oriented Hospital Course:        Goals of care.  * 1/7: Noted previous wishes for overall non-aggressive cares.Had initially been open for hospitalization and possible GI workup/treatment after speaking with her PCP, leading to this hospitalization. However, pt declined any further workup for lower GI/hemorrhoidal bleeding as noted. Confirmed/re-iterated previous wishes for overall non-aggressive cares and as such was interested in discharging home with hospice. Palliative consult placed.  * 1/8: Seen by Palliative and decided to discharge with arrangement of home hospice.    - Plan discharge with arrangement of home hospice.  Rectal bleeding, suspect lower from hemorrhoids.  Acute blood loss anemia due to above.  * Hemoglobin ~9 during the last hospitalization. PTA on omeprazole BID.  * Initial presentation as above. Hgb 7.5. Transfused 1U prbc';s on admit. GI and Colorectal surgery consulted on admit.   * 1/7: Seen by GI and Colorectal surgery. Declined any intervention/procedures/workup. Overall recommended conservative management including Metamucil and iron with vitamin C. GOC discussions as above.   Recent Labs    Lab 01/08/25  0825 01/07/25 2011 01/07/25  1207 01/07/25  0333 01/06/25  1608 01/06/25  1057   HGB 7.8* 7.4* 7.6*  7.6* 8.4* 7.2* 7.5*   - Continue diet as tolerated.  - Continue iron with vitamin C.  - Continue metamucil daily.  - Continue PTA omeprazole BID.  - Continue to monitor CBC if desired  - If desired, can follow-up outpatient with Colorectal surgery if wishes for consideration of hemorrhoidal treatment.    Acute kidney injury, suspect prerenal.  * Cr 1.12 on admit.  * Issues with acute blood loss anemia as noted. Received IV furosemide with prbc's.  * Cr 2.16 on 1/8.  Recent Labs   Lab 01/08/25 0825 01/07/25  1207 01/06/25  1608 01/06/25  1057   CR 2.16* 1.14* 1.12* 1.12*   Estimated Creatinine Clearance: 18.3 mL/min (A) (based on SCr of 2.16 mg/dL (H)).  - Continue to hold losartan and furosemide.  - Continue to monitor BMP if desired.  - Avoid nephrotoxic medications.    Recent influenza A and superimposed community-acquired pneumonia with respiratory failure.  * Recent hospitalization 12/21-12/28/2024 as noted.  * Initial presentation and imaging as above. Not requiring O2 on admit.  - Continue to monitor clinically.    Hypertension (benign essential).  H/o volume overload.  [PTA: amlodipine 10 mg daily; furosemide 20 mg daily (not started); losartan 100 mg daily.]  * Noted with volume overload last hospitalization.  * Initial presentation as above. Given IV furosemide with prbc transfusion.  - Continue amlodipine.  - Continue to hold losartan and furosemide at discharge.    GERD.  *  PTA on omeprazole BID.  - Continue omeprazole BID.    Hyperlipidemia/dyslipidemia.  - Continue atorvastatin.    Depression/anxiety.  Previous issues with insomnia.  * Issues with anxiety and insomnia last hospitalization.  * Started on PRN ramelteon on admit.  - Continue buspirone and escitalopram.    Alcohol use d/o.  * Noted, drinks 3-4 glasses/wine daily.  - Continue to monitor clinically.    Obesity.  * There is  "no height or weight on file to calculate BMI.  - Needs to continue to pursue aggressive dietary and lifestyle modifications.      Clinically Significant Risk Factors                  # Acute Kidney Injury, unspecified: based on a >150% or 0.3 mg/dL increase in last creatinine compared to past 90 day average, will monitor renal function  # Hypertension: Noted on problem list            # Obesity: Estimated body mass index is 32.1 kg/m  as calculated from the following:    Height as of an earlier encounter on 1/6/25: 1.626 m (5' 4\").    Weight as of an earlier encounter on 1/6/25: 84.8 kg (187 lb)., PRESENT ON ADMISSION                  Pending Results:        Unresulted Labs Ordered in the Past 30 Days of this Admission       No orders found from 12/7/2024 to 1/7/2025.                  Discharge Disposition:      Discharge to home.        Discharge Time:      Approximately 30 minutes          Condition and Physical on Discharge:    See progress note on the same date as this discharge summary.          Key Imaging Studies, Lab Findings and Procedures/Surgeries:        Results for orders placed or performed in visit on 01/06/25   XR Chest 2 Views    Narrative    EXAM: XR CHEST 2 VIEWS  LOCATION: [facility]  DATE: 1/6/2025    INDICATION: Follow-up heart failure and question infiltrates.  COMPARISON: 12/22/2024      Impression    IMPRESSION: Similar interstitial changes compared to previous given the rotated view. No effusions.     "

## 2025-01-08 NOTE — PLAN OF CARE
Goal Outcome Evaluation:  Orientation: A&Ox4   Aggression Stop Light: Green   Mobility: SBA  Pain Management: Denies pain   Diet: Regular diet  Bowel/Bladder: Continent b/b, BM during the day per pt  Abnormal Lab/Assessments: Hgb 7.4, Q12 recheck  Drain/Device/Wound: Skin WNL   Consults: GI, colorectal, Spiritual health, Palliative   D/C Day/Goals/Place: Pending   Other: occasional dribbling of blood per pt

## 2025-01-08 NOTE — CONSULTS
Care Management Initial Consult    General Information  Assessment completed with: Patient, Family, Ronnell  Type of CM/SW Visit: Initial Assessment    Primary Care Provider verified and updated as needed: Yes   Readmission within the last 30 days: current reason for admission unrelated to previous admission      Reason for Consult: discharge planning  Advance Care Planning: Advance Care Planning Reviewed: no concerns identified, present on chart          Communication Assessment  Patient's communication style: spoken language (English or Bilingual)             Cognitive  Cognitive/Neuro/Behavioral: WDL                      Living Environment:   People in home: alone     Current living Arrangements:        Able to return to prior arrangements: yes       Family/Social Support:  Care provided by: self  Provides care for: no one  Marital Status:   Support system: Children, Limited to (family)          Description of Support System: Supportive, Involved         Current Resources:   Patient receiving home care services: Yes  Skilled Home Care Services: Skilled Nursing, Speech Therapy, Occupational Therapy (Daughter not entirely sure)     Community Resources:    Equipment currently used at home:    Supplies currently used at home:      Employment/Financial:  Employment Status: retired        Financial Concerns: none   Referral to Financial Worker: No       Does the patient's insurance plan have a 3 day qualifying hospital stay waiver?  Yes     Which insurance plan 3 day waiver is available? [employer]    Will the waiver be used for post-acute placement? No    Lifestyle & Psychosocial Needs:  Social Drivers of Health     Food Insecurity: Low Risk  (1/7/2025)    Food Insecurity     Within the past 12 months, did you worry that your food would run out before you got money to buy more?: No     Within the past 12 months, did the food you bought just not last and you didn t have money to get more?: No    Depression: At risk (1/6/2025)    PHQ-2     PHQ-2 Score: 3   Housing Stability: Low Risk  (1/7/2025)    Housing Stability     Do you have housing? : Yes     Are you worried about losing your housing?: No   Tobacco Use: Medium Risk (1/6/2025)    Patient History     Smoking Tobacco Use: Former     Smokeless Tobacco Use: Never     Passive Exposure: Not on file   Financial Resource Strain: Low Risk  (1/7/2025)    Financial Resource Strain     Within the past 12 months, have you or your family members you live with been unable to get utilities (heat, electricity) when it was really needed?: No   Alcohol Use: Not At Risk (1/18/2021)    AUDIT-C     Frequency of Alcohol Consumption: 4 or more times a week     Average Number of Drinks: 1 or 2     Frequency of Binge Drinking: Never   Transportation Needs: Low Risk  (1/7/2025)    Transportation Needs     Within the past 12 months, has lack of transportation kept you from medical appointments, getting your medicines, non-medical meetings or appointments, work, or from getting things that you need?: No   Physical Activity: Not on File (8/26/2019)    Received from [facility]    Physical Activity   Interpersonal Safety: Low Risk  (12/21/2024)    Interpersonal Safety     Do you feel physically and emotionally safe where you currently live?: Yes     Within the past 12 months, have you been hit, slapped, kicked or otherwise physically hurt by someone?: No     Within the past 12 months, have you been humiliated or emotionally abused in other ways by your partner or ex-partner?: No   Stress: Not on File (8/26/2019)    Received from [facility]    Stress     Stress: 0   Social Connections: Unknown (1/1/2022)    Received from [facility], [facility]    Social Connections     Frequency of Communication with Friends and Family: Not on file   Health Literacy: Not on file       Functional Status:  Prior to admission patient  needed assistance:              Mental Health Status:          Chemical Dependency Status:                Values/Beliefs:  Spiritual, Cultural Beliefs, Latter-day Practices, Values that affect care: no               Discussed  Partnership in Safe Discharge Planning  document with patient/family: No    Additional Information:    Consult for discharge planning. Constantine H[facility]P, is a 89 year old female with past medical history significant for daily alcohol use, diabetes mellitus type 2 who presents with cough, shortness of breath, headache, poor intake.       Writer reviewed chart and recommendations at discharge. Writer met with patient and family at bedside and introduced self and role. Writer confirmed patient's primary doctor and home address as accurate. Patients daughter said that she may have had home health care at one point, but she is not entirely sure as there was only one visit. Patient lives in her own independent apartment and will be returning there with [facility] hospice agency.     SW discussed hospice equipment needs at this time and family did not feel she needed any equipment to return home safely at this moment. They will discuss equipment further with the hospice agency. LONI confirmed [facility] will be meeting patient and family at 9am on 1/9 for their initial meeting. LONI informed family of [facility] meeting time.     Patient to discharge home with family transport.         Next Steps: No further care management intervention anticipated at this time.  Please re-consult if further needs arise.  Care management signing off.       KATHY Vaughan      BUFFY

## 2025-01-08 NOTE — PROGRESS NOTES
[facility]    Internal Medicine Hospitalist Progress Note  01/08/2025  I evaluated patient on the above date.    Shaq England Jr., MD  [phone] (p)  Text Page  Vocera      [New actions/orders today (01/08/2025) are underlined in the assessment and plan. All lab results in the assessment and plan were reviewed.]  Assessment & Plan      Janet Cope is a 89 year old female with history including DM2; HTN; DLD; depression/anxiety; GERD; alcohol use; and recent hospitalization (12/21-12/28/2024) for issues including influenza A and superimposed bacterial pneumonia with acute hypoxemic respiratory failure. She presented 1/6/2025 with rectal bleeding.    On initial evaluation, pt was afebrile, normotensive. Labs notable for CBC with WBC normal, hgb 7.2 (hgb 9 range recent hospitalization); BMP with cr 1.12. CXR showed similar interstitial changes compared to previous (12/22/2024) given the rotated view, no effusions.    Goals of care.  * 1/7: Noted previous wishes for overall non-aggressive cares.Had initially been open for hospitalization and possible GI workup/treatment after speaking with her PCP, leading to this hospitalization. However, pt declined any further workup for lower GI/hemorrhoidal bleeding as noted. Confirmed/re-iterated previous wishes for overall non-aggressive cares and as such was interested in discharging home with hospice. Palliative consult placed.  - Plan discharge with home hospice.  - Appreciate help from OLIVIA Pfeiffer d/w her 1/8.    Rectal bleeding, suspect lower from hemorrhoids.  Acute blood loss anemia due to above.  * Hemoglobin ~9 during the last hospitalization. PTA on omeprazole BID.  * Initial presentation as above. Hgb 7.5. Transfused 1U prbc';s on admit. GI and Colorectal surgery consulted on admit.   * 1/7: Seen by GI and Colorectal surgery. Declined any intervention/procedures/workup. Overall recommended conservative management including  Metamucil and iron with vitamin C. GOC discussions as above.  Recent Labs   Lab 01/08/25  0825 01/07/25 2011 01/07/25  1207 01/07/25  0333 01/06/25  1608 01/06/25  1057   HGB 7.8* 7.4* 7.6*  7.6* 8.4* 7.2* 7.5*   - Continue diet as tolerated.  - Continue pantoprazole PO BID.  - Continue to monitor CBC - repeat in am.  - Consider prbc transfusion if hgb </= 7.0 or if significant bleeding with hemodynamic instability or if symptomatic.  - Follow-up outpatient with Colorectal surgery if wishes for consideration of hemorrhoidal treatment.  - Appreciate consultant help.    Acute kidney injury, suspect prerenal.  * Cr 1.12 on admit.  * Issues with acute blood loss anemia as noted. Received IV furosemide with prbc's.  * Cr 2.16 on 1/8.  Recent Labs   Lab 01/08/25  0825 01/07/25  1207 01/06/25  1608 01/06/25  1057   CR 2.16* 1.14* 1.12* 1.12*   Estimated Creatinine Clearance: 18.3 mL/min (A) (based on SCr of 2.16 mg/dL (H)).  - Hold losartan and continue to hold furosemide.  - Continue to monitor BMP - repeat in am.  - Avoid nephrotoxic medications.    Recent influenza A and superimposed community-acquired pneumonia with respiratory failure.  * Recent hospitalization 12/21-12/28/2024 as noted.  * Initial presentation and imaging as above. Not requiring O2 on admit.  - Continue to monitor clinically.    Hypertension (benign essential).  H/o volume overload.  [PTA: amlodipine 10 mg daily; furosemide 20 mg daily (not started); losartan 100 mg daily.]  * Noted with volume overload last hospitalization.  * Initial presentation as above. Given IV furosemide with prbc transfusion.  - Hold losartan given TRINI.  - Continue amlodipine.  - Continue to hold PTA furosemide for now.  - Continue to monitor i/o's, daily wts.    GERD.  *  PTA on omeprazole BID.  - Continue pantoprazole BID.    Hyperlipidemia/dyslipidemia.  - Continue atorvastatin at discharge.    Depression/anxiety.  Previous issues with insomnia.  * Issues with anxiety  "and insomnia last hospitalization.  * Started on PRN ramelteon on admit.  - Continue buspirone and escitalopram.  - Continue PRN ramelteon.    Alcohol use d/o.  * Noted, drinks 3-4 glasses/wine daily.  - Continue to monitor clinically.    Obesity.  * There is no height or weight on file to calculate BMI.  - Needs to continue to pursue aggressive dietary and lifestyle modifications.      Clinically Significant Risk Factors                  # Acute Kidney Injury, unspecified: based on a >150% or 0.3 mg/dL increase in last creatinine compared to past 90 day average, will monitor renal function  # Hypertension: Noted on problem list            # Obesity: Estimated body mass index is 32.1 kg/m  as calculated from the following:    Height as of an earlier encounter on 1/6/25: 1.626 m (5' 4\").    Weight as of an earlier encounter on 1/6/25: 84.8 kg (187 lb)., PRESENT ON ADMISSION            Diet: Regular Diet Adult    Prophylaxis: PCD's and ambulation  Arroyo Catheter: Not present  Lines: None     Code Status: No CPR- Do NOT Intubate    Disposition Plan   Medically Ready for Discharge: Ready Now  Expected discharge to prior living arrangement - plan home with home hospice.  Entered: Shaq England MD 01/08/2025, 1:03 PM     COMMUNICATION  - I discussed with patient's granddaughter (medical POA) 01/08/25  - I discussed with patient's daughter 01/08/25      Interval History   Resting now.  Sleeping more.  Still with blood with BM, may be slightly less.    * Data reviewed today: I reviewed all new labs and imaging over the last 24 hours. I personally reviewed no images or ECG's today.    Physical Exam   Most recent vitals:   , Blood pressure 109/53, pulse 85, temperature 97.8  F (36.6  C), temperature source Oral, resp. rate 16, SpO2 96%, not currently breastfeeding. O2 Device: None (Room air)    There were no vitals filed for this visit.  Vital signs with ranges:  Temp:  [97.2  F (36.2  C)-97.8  F (36.6  C)] 97.8  F " (36.6  C)  Pulse:  [85-92] 85  Resp:  [16] 16  BP: ()/(45-70) 109/53  SpO2:  [93 %-96 %] 96 %  Patient Vitals for the past 24 hrs:   BP Temp Temp src Pulse Resp SpO2   01/08/25 0735 109/53 97.8  F (36.6  C) Oral 85 16 96 %   01/08/25 0422 117/70 97.5  F (36.4  C) Oral 89 16 93 %   01/07/25 2338 107/57 97.7  F (36.5  C) Oral 90 16 96 %   01/07/25 1952 115/54 -- -- -- -- --   01/07/25 1857 102/50 97.4  F (36.3  C) Oral 86 16 96 %   01/07/25 1546 102/45 -- -- -- -- --   01/07/25 1541 95/46 97.2  F (36.2  C) Oral 92 16 95 %     I/O's last 24 hours:  No intake/output data recorded.    Constitutional: awake, alert, oriented, conversant, fatigued appearing   Head:   Eyes:   ENT:   Neck:   Cardiovascular: regular rate, regular rhythm, +I/VI systolic murmur, no rubs/gallops  Lungs: diminished in the bases, no crackles or wheezes  Gastrointestinal/Abdomen: soft, non-tender, non-distended, positive bowel sounds  :   Musculoskeletal:   Skin/Extremities:   Neurologic:   Psychiatric:   Hematologic/Lymphatic/Immunologic:        Labs reviewed.  Recent Labs   Lab 01/08/25  0825 01/07/25 2011 01/07/25  1207 01/07/25  0333 01/06/25  1608 01/06/25  1057   WBC 7.7  --  6.6  --  8.7 7.2   HGB 7.8* 7.4* 7.6*  7.6*   < > 7.2* 7.5*   MCV 85  --  84  --  84 82     --  378  --  436 461*     --  139  --  136 140   POTASSIUM 3.7  --  3.7  --  3.9 4.8   CHLORIDE 102  --  105  --  104 106   CO2 22  --  22  --  22 22   BUN 16.6  --  12.0  --  12.0 13.0   CR 2.16*  --  1.14*  --  1.12* 1.12*   ANIONGAP 13  --  12  --  10 12   MEL 8.7*  --  8.7*  --  8.7* 9.3   *  --  96  --  123* 99   ALBUMIN  --   --   --   --   --  3.5   PROTTOTAL  --   --   --   --   --  7.6   BILITOTAL  --   --   --   --   --  0.4   ALKPHOS  --   --   --   --   --  69   ALT  --   --   --   --   --  16   AST  --   --   --   --   --  32    < > = values in this interval not displayed.     Recent Labs   Lab Test 12/28/24  0855 12/22/24  0637  "  NT-PROBNP, INPATIENT 3,549* 5,616*     Recent Labs   Lab 01/08/25  0825 01/07/25  1207 01/06/25  1608 01/06/25  1057   * 96 123* 99     Recent Labs   Lab Test 09/28/24  0916 05/23/24  0857   A1C 5.9* 6.1*       No results for input(s): \"INR\", \"[id number]\" in the last 168 hours.  Recent Labs   Lab 01/08/25  0825 01/07/25  1207 01/06/25  1608 01/06/25  1057   WBC 7.7 6.6 8.7 7.2       MICRO:  Cultures (including blood and urine):  No lab results found in last 7 days.    No results found for this or any previous visit (from the past 24 hours).      Medications   All medications were reviewed. MAR.    Infusions:  Current Facility-Administered Medications   Medication Dose Route Frequency Provider Last Rate Last Admin     Scheduled Medications:  Current Facility-Administered Medications   Medication Dose Route Frequency Provider Last Rate Last Admin    amLODIPine (NORVASC) tablet 10 mg  10 mg Oral QPM Aquilino Crandall MD   10 mg at 01/07/25 1953    atorvastatin (LIPITOR) tablet 20 mg  20 mg Oral Daily Aquilino Crandall MD   20 mg at 01/08/25 0835    busPIRone (BUSPAR) tablet 15 mg  15 mg Oral BID Aquilino Crandall MD   15 mg at 01/08/25 0835    Elemental iron 65 mg Vitamin C 125 mg (VITRON C) tablet 1 tablet  1 tablet Oral Daily Martha Torres PA-C   1 tablet at 01/08/25 0835    escitalopram (LEXAPRO) tablet 10 mg  10 mg Oral Daily Aquilino Crandall MD   10 mg at 01/08/25 0835    pantoprazole (PROTONIX) EC tablet 40 mg  40 mg Oral BID Aquilino Crandall MD   40 mg at 01/08/25 0613    psyllium (METAMUCIL/KONSYL) Packet 1 packet  1 packet Oral Daily Martha Torres PA-C   1 packet at 01/08/25 0835    sodium chloride (PF) 0.9% PF flush 3 mL  3 mL Intracatheter Q8H Aquilino Crandall, MD   3 mL at 01/08/25 0441     PRN Medications:  Current Facility-Administered Medications   Medication Dose Route Frequency Provider Last Rate Last Admin    acetaminophen " (TYLENOL) tablet 650 mg  650 mg Oral At Bedtime PRN Aquilino Crandall MD        calcium carbonate (TUMS) chewable tablet 1,000 mg  1,000 mg Oral 4x Daily PRN Aquilino Crandall MD        guaiFENesin-dextromethorphan (ROBITUSSIN DM) 100-10 MG/5ML syrup 10 mL  10 mL Oral 4x Daily PRN Aquilino Crandall MD        lidocaine (LMX4) cream   Topical Q1H PRN Aquilino Crandall MD        lidocaine 1 % 0.1-1 mL  0.1-1 mL Other Q1H PRN Aquilino Crandall MD        ramelteon (ROZEREM) half-tab 4 mg  4 mg Oral At Bedtime PRN Shaq England MD   4 mg at 01/07/25 0916    senna-docusate (SENOKOT-S/PERICOLACE) 8.6-50 MG per tablet 1 tablet  1 tablet Oral BID PRN Aquilino Crandall MD        Or    senna-docusate (SENOKOT-S/PERICOLACE) 8.6-50 MG per tablet 2 tablet  2 tablet Oral BID PRN Aquilino Crandall MD        sodium chloride (PF) 0.9% PF flush 3 mL  3 mL Intracatheter q1 min prn Aquilino Crandall MD

## 2025-01-09 ENCOUNTER — PATIENT OUTREACH (OUTPATIENT)
Dept: CARE COORDINATION | Facility: CLINIC | Age: OVER 89
End: 2025-01-09
Payer: MEDICARE

## 2025-01-09 NOTE — PROGRESS NOTES
Clinic Care Coordination Contact  Care Coordination Clinician Chart Review    Situation: Patient chart reviewed by care coordinator.    Background: Clinic Care Coordination Referral received from inpatient care team for transition handoff communication following hospital admission.    Assessment: Upon chart review, patient is not a candidate for Primary Care Clinic Care Coordination enrollment due to reason stated below:  Patient enrolled into hospice.    Plan/Recommendations: Clinic Care Coordination Referral/order cancelled. RN/SW CC will perform no further monitoring/outreaches at this time and will remain available as needed. If new needs arise, a new Care Coordination Referral may be placed.    Rita Lambert RN Clinic Care Coordinator  [facility] Clinics: [place], [place] (on-site Wednesdays), [facility] (on-site Thursdays) & [facility].  [email or web address]  Phone: [phone]

## 2025-01-15 DIAGNOSIS — Z53.9 DIAGNOSIS NOT YET DEFINED: Primary | ICD-10-CM

## 2025-01-15 PROCEDURE — G0180 MD CERTIFICATION HHA PATIENT: HCPCS | Mod: 4MD | Performed by: INTERNAL MEDICINE

## 2025-01-22 ENCOUNTER — MEDICAL CORRESPONDENCE (OUTPATIENT)
Dept: HEALTH INFORMATION MANAGEMENT | Facility: CLINIC | Age: OVER 89
End: 2025-01-22
Payer: MEDICARE

## 2025-01-24 ENCOUNTER — MEDICAL CORRESPONDENCE (OUTPATIENT)
Dept: HEALTH INFORMATION MANAGEMENT | Facility: CLINIC | Age: OVER 89
End: 2025-01-24
Payer: MEDICARE